# Patient Record
Sex: FEMALE | Race: WHITE | NOT HISPANIC OR LATINO | Employment: UNEMPLOYED | URBAN - METROPOLITAN AREA
[De-identification: names, ages, dates, MRNs, and addresses within clinical notes are randomized per-mention and may not be internally consistent; named-entity substitution may affect disease eponyms.]

---

## 2017-02-23 ENCOUNTER — TELEPHONE (OUTPATIENT)
Dept: OTHER | Facility: CLINIC | Age: 42
End: 2017-02-23

## 2017-02-23 NOTE — TELEPHONE ENCOUNTER
2/23/2017    Call Regarding Onboarding are Choices     Attempt 1    Message on voicemail     Comments: No Dep        Outreach   cnt

## 2017-03-14 ENCOUNTER — OFFICE VISIT (OUTPATIENT)
Dept: FAMILY MEDICINE | Facility: CLINIC | Age: 42
End: 2017-03-14

## 2017-03-14 VITALS
SYSTOLIC BLOOD PRESSURE: 116 MMHG | BODY MASS INDEX: 33.55 KG/M2 | DIASTOLIC BLOOD PRESSURE: 80 MMHG | HEART RATE: 103 BPM | OXYGEN SATURATION: 97 % | WEIGHT: 197 LBS | TEMPERATURE: 98.7 F

## 2017-03-14 DIAGNOSIS — J98.01 ACUTE BRONCHOSPASM: Primary | ICD-10-CM

## 2017-03-14 PROCEDURE — 99213 OFFICE O/P EST LOW 20 MIN: CPT | Performed by: FAMILY MEDICINE

## 2017-03-14 RX ORDER — PREDNISONE 20 MG/1
20 TABLET ORAL 2 TIMES DAILY
Qty: 10 TABLET | Refills: 0 | Status: SHIPPED | OUTPATIENT
Start: 2017-03-14 | End: 2020-01-21

## 2017-03-14 RX ORDER — ALBUTEROL SULFATE 90 UG/1
2 AEROSOL, METERED RESPIRATORY (INHALATION) EVERY 4 HOURS PRN
Qty: 1 INHALER | Refills: 0 | Status: SHIPPED | OUTPATIENT
Start: 2017-03-14

## 2017-03-14 NOTE — MR AVS SNAPSHOT
"              After Visit Summary   3/14/2017    Pauline Herrera    MRN: [id number]           Patient Information     Date Of Birth          1975        Visit Information        Provider Department      3/14/2017 3:15 PM Twin Garber MD [facility]        Today's Diagnoses     Acute bronchospasm    -  1      Care Instructions    Take two tablets every morning for 3 days, then 1 tablet every morning for 3 days, then 1/2 tablet every morning for 2 days.          Follow-ups after your visit        Who to contact     If you have questions or need follow up information about today's clinic visit or your schedule please contact [facility] directly at [phone].  Normal or non-critical lab and imaging results will be communicated to you by [employer], letter or phone within 4 business days after the clinic has received the results. If you do not hear from us within 7 days, please contact the clinic through [employer] or phone. If you have a critical or abnormal lab result, we will notify you by phone as soon as possible.  Submit refill requests through [employer] or call your pharmacy and they will forward the refill request to us. Please allow 3 business days for your refill to be completed.          Additional Information About Your Visit        MyChart Information     [employer] lets you send messages to your doctor, view your test results, renew your prescriptions, schedule appointments and more. To sign up, go to [email or web address] . Click on \"Log in\" on the left side of the screen, which will take you to the Welcome page. Then click on \"Sign up Now\" on the right side of the page.     You will be asked to enter the access code listed below, as well as some personal information. Please follow the directions to create your username and password.     Your access code is: [email or web address]  Expires: 2017  3:44 PM     Your access code will  in 90 days. If you need help or a new code, " please call your [place] clinic or [phone].        Care EveryWhere ID     This is your Care EveryWhere ID. This could be used by other organizations to access your [place] medical records  [id number]        Your Vitals Were     Pulse Temperature Pulse Oximetry BMI (Body Mass Index)          103 98.7  F (37.1  C) 97% 33.55 kg/m2         Blood Pressure from Last 3 Encounters:   03/14/17 116/80   05/16/16 (!) 138/104   04/11/16 138/88    Weight from Last 3 Encounters:   03/14/17 89.4 kg (197 lb)   05/16/16 90.3 kg (199 lb)   04/11/16 94.8 kg (209 lb)              Today, you had the following     No orders found for display         Today's Medication Changes          These changes are accurate as of: 3/14/17  3:44 PM.  If you have any questions, ask your nurse or doctor.               Start taking these medicines.        Dose/Directions    albuterol 108 (90 BASE) MCG/ACT Inhaler   Commonly known as:  PROAIR HFA/PROVENTIL HFA/VENTOLIN HFA   Used for:  Acute bronchospasm   Started by:  Twin Garber MD        Dose:  2 puff   Inhale 2 puffs into the lungs every 4 hours as needed for shortness of breath / dyspnea or wheezing   Quantity:  1 Inhaler   Refills:  0       predniSONE 20 MG tablet   Commonly known as:  DELTASONE   Used for:  Acute bronchospasm   Started by:  Twin Garber MD        Dose:  20 mg   Take 1 tablet (20 mg) by mouth 2 times daily   Quantity:  10 tablet   Refills:  0            Where to get your medicines      These medications were sent to [place] [id number] [place] [facility] [address]  [address] [facility] [address]     Phone:  [phone]     albuterol 108 (90 BASE) MCG/ACT Inhaler    predniSONE 20 MG tablet                Primary Care Provider Office Phone # Fax #    Twin Garber [phone]       [facility] [address]  [facility] [address]        Thank you!     Thank you for choosing [facility]  for your  care. Our goal is always to provide you with excellent care. Hearing back from our patients is one way we can continue to improve our services. Please take a few minutes to complete the written survey that you may receive in the mail after your visit with us. Thank you!             Your Updated Medication List - Protect others around you: Learn how to safely use, store and throw away your medicines at www.disposemymeds.org.          This list is accurate as of: 3/14/17  3:44 PM.  Always use your most recent med list.                   Brand Name Dispense Instructions for use    albuterol 108 (90 BASE) MCG/ACT Inhaler    PROAIR HFA/PROVENTIL HFA/VENTOLIN HFA    1 Inhaler    Inhale 2 puffs into the lungs every 4 hours as needed for shortness of breath / dyspnea or wheezing       BENADRYL PO          predniSONE 20 MG tablet    DELTASONE    10 tablet    Take 1 tablet (20 mg) by mouth 2 times daily

## 2017-03-14 NOTE — PATIENT INSTRUCTIONS
Take two tablets every morning for 3 days, then 1 tablet every morning for 3 days, then 1/2 tablet every morning for 2 days.

## 2017-03-14 NOTE — PROGRESS NOTES
5 days of runny nose, dry cough and no sore throat. No fever or chills. No SOB or chest pain.  Smoker <1PPD  ROS: no nausea or vomiting  OBJECTIVE: /80 (BP Location: Left arm)  Pulse 103  Temp 98.7  F (37.1  C)  Wt 89.4 kg (197 lb)  SpO2 97%  BMI 33.55 kg/m2   NAD except fatigue. TM's OK. Turbinates red and swollen. Pharynx injected. No nodes. Lungs are wheezing all fields, no rales, and cor RRR.  (J98.01) Acute bronchospasm  (primary encounter diagnosis)  Comment:   Plan: albuterol (PROAIR HFA/PROVENTIL HFA/VENTOLIN         HFA) 108 (90 BASE) MCG/ACT Inhaler, predniSONE         (DELTASONE) 20 MG tablet

## 2017-03-14 NOTE — LETTER
[facility] [address] [place] [address]  Phone: [phone]              3/14/2017      Pauline Herrera      TO WHOM IT MAY CONCERN:    Pauline Herrera was seen today for illness.  Please excuse her from work, starting 3/14/2017 until 3/16.        Twin Garber M.D.    [facility]

## 2017-05-22 NOTE — TELEPHONE ENCOUNTER
5/22/2017    Call Regarding Onboarding [facility]    Attempt 2    Message on voicemail     Comments:         Outreach   HERSON

## 2017-06-06 NOTE — TELEPHONE ENCOUNTER
6/6/2017    Call Regarding Onboarding [employer]    Attempt 3    Message on voicemail     Comments: dep --- spouse         Outreach   cnt

## 2017-06-17 ENCOUNTER — HEALTH MAINTENANCE LETTER (OUTPATIENT)
Age: 42
End: 2017-06-17

## 2018-02-27 NOTE — PROGRESS NOTES
"HCM:  Tetanus Declined  Pap screening        SUBJECTIVE:   Pauline Herrera is a 42 year old female who presents to clinic today for the following health issues:  Seen by Dr Garber 3/14/17 for Bronchospasm      Medication Followup of  phenterime  Last dose was yesterday  [facility]   She wants to switch it from here Taking 2 per day.  Nutritionist in the past. Seeing February.  Counting calories goal 1800  Estimated body mass index is 31 kg/(m^2) as calculated from the following:    Height as of 5/27/15: 1.632 m (5' 4.25\").    Weight as of this encounter: 82.6 kg (182 lb).  Weight loss is recommended  Wt Readings from Last 4 Encounters:   03/01/18 82.6 kg (182 lb)   03/14/17 89.4 kg (197 lb)   05/16/16 90.3 kg (199 lb)   04/11/16 94.8 kg (209 lb)         Taking Medication as prescribed: yes    Side Effects:  None    Medication Helping Symptoms:  yes       Job:  at dental practice  Hobbies: when nice walking    Sleep good  Appetite ok Today Spring roll  No eating disorder history  Exercise Beach body 2 in last week    Smoking yes <1ppd. Tried Chantix, patch (allergy).   ETOH 3 days 2-3 Jamisons. (advised 2 per day)  Street drugs/MJ no  Caffeine coffee    Depression no  Anxiety no  Panic no  SI/SP no  Hallucinations no  Paranoid no  Manic no  OCD no  PTSD no  Gambling no  No guns    Travel plans no  Exposure no            Problem list and histories reviewed & adjusted, as indicated.  Additional history: as documented    There is no problem list on file for this patient.    Past Surgical History:   Procedure Laterality Date     HC TOOTH EXTRACTION W/FORCEP         Social History   Substance Use Topics     Smoking status: Current Every Day Smoker     Smokeless tobacco: Current User     Alcohol use Yes     Family History   Problem Relation Age of Onset     Adopted: Yes         Current Outpatient Prescriptions   Medication Sig Dispense Refill     triamcinolone (KENALOG) 0.025 % cream " APPLY CREAM TOPICALLY THREE TIMES DAILY TO AFFECTED AREAS UNTIL CLEAR  0     nicotine polacrilex (TGT NICOTINE POLACRILEX) 4 MG lozenge Place 1 lozenge (4 mg) inside cheek as needed for smoking cessation As directed on packaging 360 tablet 1     DiphenhydrAMINE HCl (BENADRYL PO)        albuterol (PROAIR HFA/PROVENTIL HFA/VENTOLIN HFA) 108 (90 BASE) MCG/ACT Inhaler Inhale 2 puffs into the lungs every 4 hours as needed for shortness of breath / dyspnea or wheezing (Patient not taking: Reported on 3/1/2018) 1 Inhaler 0     predniSONE (DELTASONE) 20 MG tablet Take 1 tablet (20 mg) by mouth 2 times daily (Patient not taking: Reported on 3/1/2018) 10 tablet 0     No Known Allergies  No lab results found.   BP Readings from Last 3 Encounters:   03/01/18 124/86   03/14/17 116/80   05/16/16 (!) 138/104    Wt Readings from Last 3 Encounters:   03/01/18 82.6 kg (182 lb)   03/14/17 89.4 kg (197 lb)   05/16/16 90.3 kg (199 lb)                  Labs reviewed in EPIC    Reviewed and updated as needed this visit by clinical staff       Reviewed and updated as needed this visit by Provider         ROS:  Constitutional, HEENT, cardiovascular, pulmonary, GI, , musculoskeletal, neuro, skin, endocrine and psych systems are negative, except as otherwise noted.    History of dermatitis. Wants refills Dermatitis Derm Dr Peterson.     No flu shot  Tetanus status ? Declined    Mammogram no  Pap none in years        OBJECTIVE:     /86  Pulse 102  Temp 98.8  F (37.1  C)  Resp 18  Wt 82.6 kg (182 lb)  LMP 02/26/2018  SpO2 97%  BMI 31 kg/m2  Body mass index is 31 kg/(m^2).  GENERAL: healthy, alert and no distress  EYES: Eyes grossly normal to inspection, PERRL and conjunctivae and sclerae normal  HENT: ear canals and TM's normal, nose and mouth without ulcers or lesions  NECK: no adenopathy, no asymmetry, masses, or scars and thyroid normal to palpation  RESP: lungs clear to auscultation - no rales, rhonchi or wheezes  CV: regular  "rate and rhythm, normal S1 S2, no S3 or S4, no murmur, click or rub, no peripheral edema and peripheral pulses strong  ABDOMEN: soft, nontender, no hepatosplenomegaly, no masses and bowel sounds normal  MS: no gross musculoskeletal defects noted, no edema  SKIN: no suspicious lesions or rashes  NEURO: Normal strength and tone, mentation intact and speech normal  PSYCH: mentation appears normal, affect normal/bright  LYMPH: no cervical, supraclavicular, axillary, or inguinal adenopathy    Diagnostic Test Results:  Results for orders placed or performed in visit on 04/11/16   Rapid Strep ([facility])   Result Value Ref Range    Rapid Strep A Screen POSITIVE neg       ASSESSMENT/PLAN:     ASSESSMENT / PLAN:  (Z71.6,  Z72.0) Encounter for smoking cessation counseling  (primary encounter diagnosis)  Comment:   Plan: nicotine polacrilex (TGT NICOTINE POLACRILEX) 4        MG lozenge            (L20.9) Atopic dermatitis, unspecified type  Comment:   Plan: triamcinolone (KENALOG) 0.025 % cream            (Z13.220,  Z13.6) Encounter for lipid screening for cardiovascular disease  Comment:   Plan: Lipid Panel ([facility])            (Z68.31) BMI 31.0-31.9,adult  Wt Readings from Last 4 Encounters:   03/01/18 82.6 kg (182 lb)   03/14/17 89.4 kg (197 lb)   05/16/16 90.3 kg (199 lb)   04/11/16 94.8 kg (209 lb)     I see she has made some progress on weight loss.  Comment: She stated that she was on phentermine\" I think that I took 30 mg twice a day\" (an unlikely dose) from another provider. I have not records either with the pharmacy or [place] monitoring. She has another previous last name as Wilber. I tried that too. I have asked her for RONAL for the previous provider. She will call back with the information/return with it to do the form. No phentermine given today. Contract signed and toxassure done. She denied chem dep history.  Plan: ToxASSURE Urine Drug Screen ([facility])            (Z13.228) Screening for metabolic disorder  Comment: "   Plan: Comp. Metabolic Panel (14) ([facility]), TSH         ([facility]), Thyroxine (T4) Free  Direct  S         ([facility]), Hemoglobin A1C ([facility])            (Z13.0) Screening, anemia, deficiency, iron  Comment:   Plan: CBC with Diff/Plt ([facility])                  Liset Berman MD  [facility]

## 2018-03-01 ENCOUNTER — OFFICE VISIT (OUTPATIENT)
Dept: FAMILY MEDICINE | Facility: CLINIC | Age: 43
End: 2018-03-01

## 2018-03-01 VITALS
BODY MASS INDEX: 31 KG/M2 | OXYGEN SATURATION: 97 % | WEIGHT: 182 LBS | DIASTOLIC BLOOD PRESSURE: 86 MMHG | RESPIRATION RATE: 18 BRPM | SYSTOLIC BLOOD PRESSURE: 124 MMHG | HEART RATE: 102 BPM | TEMPERATURE: 98.8 F

## 2018-03-01 DIAGNOSIS — Z13.0 SCREENING, ANEMIA, DEFICIENCY, IRON: ICD-10-CM

## 2018-03-01 DIAGNOSIS — L20.9 ATOPIC DERMATITIS, UNSPECIFIED TYPE: ICD-10-CM

## 2018-03-01 DIAGNOSIS — Z71.6 ENCOUNTER FOR SMOKING CESSATION COUNSELING: Primary | ICD-10-CM

## 2018-03-01 DIAGNOSIS — Z13.220 ENCOUNTER FOR LIPID SCREENING FOR CARDIOVASCULAR DISEASE: ICD-10-CM

## 2018-03-01 DIAGNOSIS — Z13.228 SCREENING FOR METABOLIC DISORDER: ICD-10-CM

## 2018-03-01 DIAGNOSIS — Z13.6 ENCOUNTER FOR LIPID SCREENING FOR CARDIOVASCULAR DISEASE: ICD-10-CM

## 2018-03-01 LAB
% GRANULOCYTES: 67.1 % (ref 42.2–75.2)
HCT VFR BLD AUTO: 43.6 % (ref 35–46)
HEMOGLOBIN: 14.5 G/DL (ref 11.8–15.5)
LYMPHOCYTES NFR BLD AUTO: 26.4 % (ref 20.5–51.1)
MCH RBC QN AUTO: 30.4 PG (ref 27–31)
MCHC RBC AUTO-ENTMCNC: 33.2 G/DL (ref 33–37)
MCV RBC AUTO: 91.8 FL (ref 80–100)
MONOCYTES NFR BLD AUTO: 6.5 % (ref 1.7–9.3)
PLATELET # BLD AUTO: 340 K/UL (ref 140–450)
RBC # BLD AUTO: 4.75 X10/CMM (ref 3.7–5.2)
WBC # BLD AUTO: 7.5 X10/CMM (ref 3.8–11)

## 2018-03-01 PROCEDURE — 80061 LIPID PANEL: CPT | Mod: 90 | Performed by: FAMILY MEDICINE

## 2018-03-01 PROCEDURE — 84443 ASSAY THYROID STIM HORMONE: CPT | Mod: 90 | Performed by: FAMILY MEDICINE

## 2018-03-01 PROCEDURE — 80053 COMPREHEN METABOLIC PANEL: CPT | Mod: 90 | Performed by: FAMILY MEDICINE

## 2018-03-01 PROCEDURE — 99214 OFFICE O/P EST MOD 30 MIN: CPT | Performed by: FAMILY MEDICINE

## 2018-03-01 PROCEDURE — 84439 ASSAY OF FREE THYROXINE: CPT | Mod: 90 | Performed by: FAMILY MEDICINE

## 2018-03-01 PROCEDURE — 83036 HEMOGLOBIN GLYCOSYLATED A1C: CPT | Mod: 90 | Performed by: FAMILY MEDICINE

## 2018-03-01 PROCEDURE — 85025 COMPLETE CBC W/AUTO DIFF WBC: CPT | Performed by: FAMILY MEDICINE

## 2018-03-01 PROCEDURE — 36415 COLL VENOUS BLD VENIPUNCTURE: CPT | Performed by: FAMILY MEDICINE

## 2018-03-01 RX ORDER — TRIAMCINOLONE ACETONIDE 0.25 MG/G
CREAM TOPICAL
Qty: 80 G | Refills: 0 | Status: SHIPPED | OUTPATIENT
Start: 2018-03-01 | End: 2019-04-23

## 2018-03-01 RX ORDER — TRIAMCINOLONE ACETONIDE 0.25 MG/G
CREAM TOPICAL
Refills: 0 | COMMUNITY
Start: 2017-06-13 | End: 2018-03-01

## 2018-03-01 NOTE — MR AVS SNAPSHOT
"              After Visit Summary   3/1/2018    Pauline Herrera    MRN: [id number]           Patient Information     Date Of Birth          1975        Visit Information        Provider Department      3/1/2018 10:15 AM Liset Berman MD [facility]        Today's Diagnoses     Encounter for smoking cessation counseling    -  1    Atopic dermatitis, unspecified type        Encounter for lipid screening for cardiovascular disease        BMI 31.0-31.9,adult        Screening for metabolic disorder        Screening, anemia, deficiency, iron           Follow-ups after your visit        Who to contact     If you have questions or need follow up information about today's clinic visit or your schedule please contact [facility] directly at [phone].  Normal or non-critical lab and imaging results will be communicated to you by [employer], letter or phone within 4 business days after the clinic has received the results. If you do not hear from us within 7 days, please contact the clinic through [employer] or phone. If you have a critical or abnormal lab result, we will notify you by phone as soon as possible.  Submit refill requests through [employer] [facility] [employer] or call your pharmacy and they will forward the refill request to us. Please allow 3 business days for your refill to be completed.          Additional Information About Your Visit        MyChart Information     [facility] lets you send messages to your doctor, view your test results, renew your prescriptions, schedule appointments and more. To sign up, go to [email or web address] . Click on \"Log in\" on the left side of the screen, which will take you to the Welcome page. Then click on \"Sign up Now\" on the right side of the page.     You will be asked to enter the access code listed below, as well as some personal information. Please follow the directions to create your username and password.     Your access code is: [email or web address]  Expires: " 2018 11:20 AM     Your access code will  in 90 days. If you need help or a new code, please call your [place] clinic or [phone].        Care EveryWhere ID     This is your Care EveryWhere ID. This could be used by other organizations to access your [place] medical records  [id number]        Your Vitals Were     Pulse Temperature Respirations Last Period Pulse Oximetry BMI (Body Mass Index)    102 98.8  F (37.1  C) 18 2018 97% 31 kg/m2       Blood Pressure from Last 3 Encounters:   18 124/86   17 116/80   16 (!) 138/104    Weight from Last 3 Encounters:   18 82.6 kg (182 lb)   17 89.4 kg (197 lb)   16 90.3 kg (199 lb)              We Performed the Following     CBC with Diff/Plt ([facility])     Comp. Metabolic Panel (14) ([facility])     Hemoglobin A1C ([facility])     Lipid Panel ([facility])     Thyroxine (T4) Free  Direct  S ([facility])     ToxASSURE Urine Drug Screen ([facility])     TSH ([facility])          Today's Medication Changes          These changes are accurate as of 3/1/18 11:20 AM.  If you have any questions, ask your nurse or doctor.               Start taking these medicines.        Dose/Directions    nicotine polacrilex 4 MG lozenge   Commonly known as:  TGT NICOTINE POLACRILEX   Used for:  Encounter for smoking cessation counseling   Started by:  Liset Berman MD        Dose:  4 mg   Place 1 lozenge (4 mg) inside cheek as needed for smoking cessation As directed on packaging   Quantity:  360 tablet   Refills:  1         These medicines have changed or have updated prescriptions.        Dose/Directions    triamcinolone 0.025 % cream   Commonly known as:  KENALOG   This may have changed:  See the new instructions.   Used for:  Atopic dermatitis, unspecified type   Changed by:  Liset Berman MD        APPLY CREAM TOPICALLY THREE TIMES DAILY TO AFFECTED AREAS UNTIL CLEAR   Quantity:  80 g   Refills:  0            Where to get your medicines      These  medications were sent to [place] [id number] [facility] [place], [place] - [address]  [address], [facility] [address]     Phone:  [phone]     nicotine polacrilex 4 MG lozenge    triamcinolone 0.025 % cream                Primary Care Provider Office Phone # Fax #    Twin Garber [phone] [address]  [facility] [address]        Equal Access to Services     IMER [facility] : Hadii aad ku hadasho Soomaali, waaxda luqadaha, qaybta kaalmada adeegyada, waxay idiin hayaan adeeg kharash la'aan . So [facility] [phone].    ATENCIÓN: Si habla español, tiene a wall disposición servicios gratuitos de asistencia lingüística. Ll[facility] [phone].    We comply with applicable federal civil rights laws and [place] laws. We do not discriminate on the basis of race, color, national origin, age, disability, sex, sexual orientation, or gender identity.            Thank you!     Thank you for choosing [facility]  for your care. Our goal is always to provide you with excellent care. Hearing back from our patients is one way we can continue to improve our services. Please take a few minutes to complete the written survey that you may receive in the mail after your visit with us. Thank you!             Your Updated Medication List - Protect others around you: Learn how to safely use, store and throw away your medicines at www.disposemymeds.org.          This list is accurate as of 3/1/18 11:20 AM.  Always use your most recent med list.                   Brand Name Dispense Instructions for use Diagnosis    albuterol 108 (90 BASE) MCG/ACT Inhaler    PROAIR HFA/PROVENTIL HFA/VENTOLIN HFA    1 Inhaler    Inhale 2 puffs into the lungs every 4 hours as needed for shortness of breath / dyspnea or wheezing    Acute bronchospasm       BENADRYL PO           nicotine polacrilex 4 MG lozenge    TGT NICOTINE POLACRILEX    360 tablet    Place 1 lozenge (4 mg) inside cheek as needed for smoking  cessation As directed on packaging    Encounter for smoking cessation counseling       predniSONE 20 MG tablet    DELTASONE    10 tablet    Take 1 tablet (20 mg) by mouth 2 times daily    Acute bronchospasm       triamcinolone 0.025 % cream    KENALOG    80 g    APPLY CREAM TOPICALLY THREE TIMES DAILY TO AFFECTED AREAS UNTIL CLEAR    Atopic dermatitis, unspecified type

## 2018-03-01 NOTE — LETTER
[facility]    03/01/18    Patient: Pauline Herrera  YOB: 1975  Medical Record Number: [id number]                                                                  Controlled Substance Agreement  I understand that my care provider has prescribed controlled substances (narcotics, tranquilizers, and/or stimulants) to help manage my condition(s).  I am taking this medicine to help me function or work.  I know that this is strong medicine.  It could have serious side effects and even cause a dependency on the drug.  If I stop these medicines suddenly, I could have severe withdrawal symptoms.    The risks, benefits, and side effects of these medication(s) were explained to me.  I agree that:  1. I will take part in other treatments as advised by my provider.  This may be psychiatry or counseling, physical therapy, behavioral therapy, group treatment, or a referral to a pain clinic.  I will reduce or stop my medicine when my provider tells me to do so.   2. I will take my medicines as prescribed.  I will not change the dose or schedule unless my provider tells me to.  There will be no refills if I  run out early.   I may be contacted at any time without warning and asked to complete a drug test or pill count.   3. I will keep all my appointments at the clinic.  If I miss appointments or fail to follow instructions, my provider may stop my medicine.  4. I will not ask other providers to prescribe controlled substances. And I will not accept controlled substances from other people. If I need another prescribed controlled substance for a new reason, I will notify my provider within one business day.  5. If I enroll in the [place] Medical Marijuana program, I will tell my provider.  I will also sign an agreement to share my medical records with my provider.  6. I will use one pharmacy to fill all of my controlled substance prescriptions.  If my prescription is mailed to my pharmacy, it may take 5  to 7 days for my medicine to be ready.  7. I understand that my provider, clinic care team, and pharmacy can track controlled substance prescriptions from other providers through a central database (prescription monitoring program).  8. I will bring in my list of medications (or my medicine bottles) each time I come to the clinic.  REV-  04/2016                                                                                                                                            Page 1 of 2      [facility]    03/01/18    Patient: Pauline Herrera  YOB: 1975  Medical Record Number: [id number]    9. Refills of controlled substances will be made only during office hours.  It is up to me to make sure that I do not run out of my medicines on weekends or holidays.    10. I am responsible for my prescriptions.  If the medicine is lost or stolen, it will not be replaced.   I also agree not to share these medicines with anyone.  11. I agree to not use ANY illegal or recreational drugs.  This includes marijuana, cocaine, bath salts or other drugs.  I agree not to use alcohol unless my provider says I may.  I agree to give urine samples whenever asked.  If I fail to give a urine sample, the provider may stop my medicine.     12. I will tell my nurse or provider right away if I become pregnant or have a new medical problem treated outside of [facility].  13. I understand that this medicine can affect my thinking and judgment.  It may be unsafe for me to drive, use machinery and do dangerous tasks.  I will not do any of these things until I know how the medicine affects me.  If my dose changes, I will wait to see how it affects me.  I will contact my provider if I have concerns about medicine side effects.  I understand that if I do not follow any of the conditions above, my prescriptions or treatment may be stopped.    I agree that my provider, clinic care team, and pharmacy may work with any  city, state or federal law enforcement agency that investigates the misuse, sale, or other diversion of my controlled medicine. I will allow my provider to discuss my care with or share a copy of this agreement with any other treating provider, pharmacy or emergency room where I receive care.  I agree to give up (waive) any right of privacy or confidentiality with respect to these authorizations.   I have read this agreement and have asked questions about anything I did not understand.   ___________________________________    ___________________________  Patient Signature                                                           Date and Time  ___________________________________     ____________________________  Witness                                                                            Date and Time  ___________________________________  Liset Berman MD  REV-  04/2016                                                                                                                                                                 Page 2 of 2

## 2018-03-02 LAB
ALBUMIN SERPL-MCNC: 4.4 G/DL (ref 3.5–5.5)
ALBUMIN/GLOB SERPL: 1.5 {RATIO} (ref 1.2–2.2)
ALP SERPL-CCNC: 71 IU/L (ref 39–117)
ALT SERPL-CCNC: 30 IU/L (ref 0–32)
AST SERPL-CCNC: 17 IU/L (ref 0–40)
BILIRUB SERPL-MCNC: 0.4 MG/DL (ref 0–1.2)
BUN SERPL-MCNC: 20 MG/DL (ref 6–24)
BUN/CREATININE RATIO: 32 (ref 9–23)
CALCIUM SERPL-MCNC: 9.6 MG/DL (ref 8.7–10.2)
CHLORIDE SERPLBLD-SCNC: 103 MMOL/L (ref 96–106)
CHOLEST SERPL-MCNC: 197 MG/DL (ref 100–199)
CREAT SERPL-MCNC: 0.62 MG/DL (ref 0.57–1)
EGFR IF AFRICN AM: 130 ML/MIN/1.73
EGFR IF NONAFRICN AM: 112 ML/MIN/1.73
GLOBULIN, TOTAL: 2.9 G/DL (ref 1.5–4.5)
GLUCOSE SERPL-MCNC: 108 MG/DL (ref 65–99)
HBA1C MFR BLD: 5.8 % (ref 4.8–5.6)
HDLC SERPL-MCNC: 71 MG/DL
LDL/HDL RATIO: 1.2 RATIO UNITS (ref 0–3.2)
LDLC SERPL CALC-MCNC: 85 MG/DL (ref 0–99)
POTASSIUM SERPL-SCNC: 4.3 MMOL/L (ref 3.5–5.2)
PROT SERPL-MCNC: 7.3 G/DL (ref 6–8.5)
SODIUM SERPL-SCNC: 142 MMOL/L (ref 134–144)
T4 FREE SERPL-MCNC: 1.14 NG/DL (ref 0.82–1.77)
TOTAL CO2: 22 MMOL/L (ref 18–28)
TRIGL SERPL-MCNC: 207 MG/DL (ref 0–149)
TSH BLD-ACNC: 0.9 UIU/ML (ref 0.45–4.5)
VLDLC SERPL CALC-MCNC: 41 MG/DL (ref 5–40)

## 2018-03-07 LAB — SUMMARY OF DRUGS IDENTIFIED: NORMAL

## 2018-03-15 ENCOUNTER — TELEPHONE (OUTPATIENT)
Dept: FAMILY MEDICINE | Facility: CLINIC | Age: 43
End: 2018-03-15

## 2018-03-15 NOTE — TELEPHONE ENCOUNTER
----- Message from Liset Berman MD sent at 3/11/2018  9:56 AM CDT -----  Please call the patient with the results.   The phentermine was  Noted on your screen . We have not received records from previous prescriber. Please sign a RONAL so that we may get these if you have not already done so.    Enclosed are your recent lab results. Your drug screen was positive for marijuana use.    This is an illegal drug in the state of [place], unless you are on the [place] Marijuana program with a certificate. Continued use will break our pain contract and not allow us to prescribe controlled substances for you.    In other words, all marijuana use must stop, before being considered for this medication.    Please contact our behavioral providers if you need resources to help you quit.         Liset Berman MD

## 2019-04-23 DIAGNOSIS — L20.9 ATOPIC DERMATITIS, UNSPECIFIED TYPE: ICD-10-CM

## 2019-04-23 RX ORDER — TRIAMCINOLONE ACETONIDE 0.25 MG/G
CREAM TOPICAL
Qty: 80 G | Refills: 0 | Status: SHIPPED | OUTPATIENT
Start: 2019-04-23 | End: 2019-11-21

## 2019-11-21 ENCOUNTER — OFFICE VISIT (OUTPATIENT)
Dept: FAMILY MEDICINE | Facility: CLINIC | Age: 44
End: 2019-11-21

## 2019-11-21 VITALS
HEART RATE: 101 BPM | BODY MASS INDEX: 30.92 KG/M2 | DIASTOLIC BLOOD PRESSURE: 96 MMHG | WEIGHT: 192.38 LBS | HEIGHT: 66 IN | SYSTOLIC BLOOD PRESSURE: 158 MMHG | RESPIRATION RATE: 16 BRPM | OXYGEN SATURATION: 96 %

## 2019-11-21 DIAGNOSIS — E66.811 CLASS 1 OBESITY DUE TO EXCESS CALORIES WITHOUT SERIOUS COMORBIDITY IN ADULT, UNSPECIFIED BMI: ICD-10-CM

## 2019-11-21 DIAGNOSIS — I10 ESSENTIAL HYPERTENSION: ICD-10-CM

## 2019-11-21 DIAGNOSIS — Z71.6 ENCOUNTER FOR SMOKING CESSATION COUNSELING: ICD-10-CM

## 2019-11-21 DIAGNOSIS — L20.9 ATOPIC DERMATITIS, UNSPECIFIED TYPE: ICD-10-CM

## 2019-11-21 DIAGNOSIS — J40 BRONCHITIS: Primary | ICD-10-CM

## 2019-11-21 DIAGNOSIS — E66.09 CLASS 1 OBESITY DUE TO EXCESS CALORIES WITHOUT SERIOUS COMORBIDITY IN ADULT, UNSPECIFIED BMI: ICD-10-CM

## 2019-11-21 PROCEDURE — 99213 OFFICE O/P EST LOW 20 MIN: CPT | Performed by: FAMILY MEDICINE

## 2019-11-21 RX ORDER — PHENTERMINE HYDROCHLORIDE 37.5 MG/1
37.5 TABLET ORAL
COMMUNITY
End: 2020-01-21

## 2019-11-21 RX ORDER — LISINOPRIL 5 MG/1
5 TABLET ORAL DAILY
Qty: 90 TABLET | Refills: 3 | Status: SHIPPED | OUTPATIENT
Start: 2019-11-21 | End: 2020-01-21

## 2019-11-21 RX ORDER — BUPROPION HYDROCHLORIDE 150 MG/1
150 TABLET ORAL EVERY MORNING
Qty: 30 TABLET | Refills: 3 | Status: SHIPPED | OUTPATIENT
Start: 2019-11-21 | End: 2020-01-15

## 2019-11-21 RX ORDER — AZITHROMYCIN 250 MG/1
TABLET, FILM COATED ORAL
Qty: 6 TABLET | Refills: 0 | Status: SHIPPED | OUTPATIENT
Start: 2019-11-21 | End: 2020-01-21

## 2019-11-21 RX ORDER — PHENTERMINE HYDROCHLORIDE 15 MG/1
15 CAPSULE ORAL EVERY MORNING
Qty: 30 CAPSULE | Refills: 3 | Status: SHIPPED | OUTPATIENT
Start: 2019-11-21 | End: 2020-01-21

## 2019-11-21 RX ORDER — TRIAMCINOLONE ACETONIDE 0.25 MG/G
CREAM TOPICAL
Qty: 80 G | Refills: 1 | Status: SHIPPED | OUTPATIENT
Start: 2019-11-21 | End: 2020-09-14

## 2019-11-21 ASSESSMENT — MIFFLIN-ST. JEOR: SCORE: 1544.36

## 2019-11-21 NOTE — PROGRESS NOTES
Problem(s) Oriented visit        SUBJECTIVE:                                                    Pauline Herrera is a 44 year old female who presents to clinic today for the following health issues :            1. Atopic dermatitis, unspecified type  Doing better  - triamcinolone (KENALOG) 0.025 % cream; APPLY TOPICALLY THREE TIMES DAILY TO AFFECTED AREAS UNTIL CLEAR  Dispense: 80 g; Refill: 1    2. Bronchitis  The patient complains of cough non-productive, without wheezing, dyspnea or hemoptysis for 2 months.  Quality: strong and weak  Severity: moderate  Associated symptoms: none.        3. Essential hypertension  she has Hypertension which is currently not well controlled. she has been compliant with her medications and is here today to follow up on the this issue and see if we can't work on better control of the blood pressure.    Reviewed last 6 BP readings in chart:  BP Readings from Last 6 Encounters:   11/21/19 (!) 158/96   03/01/18 124/86   03/14/17 116/80   05/16/16 (!) 138/104   04/11/16 138/88   05/27/15 134/88     she  has not experienced any significant side effects from current medications for hypertension.    NO active cardiac complaints or symptoms with exercise.      4. Encounter for smoking cessation counseling  The patient has been a long time smoker and wants to discuss smoking cessation, possibly to include medication.   Current smoking amount:1ppd  Length of time smoking: years  Number of previous attempts:3  Previous trials of Wellbutrin or Chantix: yes chantix and didn't like it    5. Class 1 obesity due to excess calories without serious comorbidity in adult, unspecified BMI  The patient has had a history of ongoing obesity.  Reviewed the weigth curves.   Their current BMI is:  Body mass index is 31.05 kg/m .  Reviewed previous attempts at weight loss which have not been successful in producing prolonged weigth loss.   Discussed current eating and exercise habits.     Reviewed weights in  "chart:  Wt Readings from Last 10 Encounters:   11/21/19 87.3 kg (192 lb 6 oz)   03/01/18 82.6 kg (182 lb)   03/14/17 89.4 kg (197 lb)   05/16/16 90.3 kg (199 lb)   04/11/16 94.8 kg (209 lb)   05/27/15 95.7 kg (211 lb)   03/09/08 70.3 kg (155 lb)      BMI Readings from Last 10 Encounters:   11/21/19 31.05 kg/m    03/01/18 31.00 kg/m    03/14/17 33.55 kg/m    05/16/16 33.89 kg/m    04/11/16 35.60 kg/m    05/27/15 35.94 kg/m    03/09/08 25.02 kg/m               Problem list, Medication list, Allergies, and Medical/Social/Surgical histories reviewed in EPIC and updated as appropriate.   Additional history: as documented    ROS:  General and CV completed and negative except as noted above    Histories:   There is no problem list on file for this patient.    Past Surgical History:   Procedure Laterality Date     HC TOOTH EXTRACTION W/FORCEP         Social History     Tobacco Use     Smoking status: Current Every Day Smoker     Smokeless tobacco: Current User   Substance Use Topics     Alcohol use: Yes     Family History   Adopted: Yes   Problem Relation Age of Onset     Medical History Unknown Mother            OBJECTIVE:                                                    BP (!) 158/96   Pulse 101   Resp 16   Ht 1.676 m (5' 6\")   Wt 87.3 kg (192 lb 6 oz)   LMP 11/07/2019 (Exact Date)   SpO2 96%   BMI 31.05 kg/m    Body mass index is 31.05 kg/m .   APPEARANCE: = Relaxed and in no distress  Conj/Eyelids = noninjected and lids and lashes are without inflammation  PERRLA/Irises = Pupils Round Reactive to Light and Irisis without inflammation  Neck = No anterior or posterior adenopathy appreciated.  Thyroid = Not enlarged and no masses felt  Resp effort = Calm regular breathing  Breath Sounds = Good air movement with no rales or rhonchi in any lung fields  Heart Rate, Rhythm, & sounds (no Murm)  = Regular rate and rhythm with no S3, S4, or murmur appreciated.  Carotid Art's = Pulses full and equal and no bruits " appreciated  Abdomen = Soft, nontender, no masses, & bowel sounds in all quadrants  Liver/Spleen = Normal span and no splenomegaly noted  Digits and Nails = FROM in all finger joints, no nail dystrophy  Ext (edema) = No pretibial edema noted or elsewhere  Musculsktl =  Strength and ROM of major joints are within normal limits  SKIN = absent significant rashes or lesions   Recent/Remote Memory = Alert and Oriented x 3  Mood/Affect = Cooperative and interested     ASSESSMENT/PLAN:                                                        Pauline was seen today for smoking cessation and weight problem.    Diagnoses and all orders for this visit:    Bronchitis    Atopic dermatitis, unspecified type  -     triamcinolone (KENALOG) 0.025 % cream; APPLY TOPICALLY THREE TIMES DAILY TO AFFECTED AREAS UNTIL CLEAR    Essential hypertension    Encounter for smoking cessation counseling    Class 1 obesity due to excess calories without serious comorbidity in adult, unspecified BMI        Work on weight loss  Regular exercise    The following health maintenance items are reviewed in Epic and correct as of today:  Health Maintenance   Topic Date Due     PREVENTIVE CARE VISIT  1975     DTAP/TDAP/TD IMMUNIZATION (1 - Tdap) 11/21/1987     HIV SCREENING  11/21/1991     PNEUMOCOCCAL IMMUNIZATION 19-64 MEDIUM RISK (1 of 1 - PPSV23) 11/21/1995     HPV  11/21/1997     PAP  11/21/2001     PHQ-2  01/01/2019     INFLUENZA VACCINE (1) 09/01/2019     IPV IMMUNIZATION  Aged Out     MENINGITIS IMMUNIZATION  Aged Out       Adrien Lyons MD  [facility]  Family Practice  [facility]  [phone]    For any issues my office # is [phone]

## 2019-11-21 NOTE — LETTER
[facility] [address] [place]  [address]  Phone: [phone]    December 4, 2019      Pauline Radhika Javier  [address]  [facility] [address]              Dear Pauline,      Here is a copy of your labs, we will discuss them at your upcoming visit.         Sincerely,     Adrien Lyons M.D.    Results for orders placed or performed in visit on 11/21/19   Comp. Metabolic Panel (14) ([facility])     Status: Abnormal   Result Value Ref Range    Glucose 153 (H) 65 - 99 mg/dL    Urea Nitrogen 16 6 - 24 mg/dL    Creatinine 0.61 0.57 - 1.00 mg/dL    eGFR If NonAfricn Am 111 >59 mL/min/1.73    eGFR If Africn Am 128 >59 mL/min/1.73    BUN/Creatinine Ratio 26 (H) 9 - 23    Sodium 136 134 - 144 mmol/L    Potassium 4.3 3.5 - 5.2 mmol/L    Chloride 102 96 - 106 mmol/L    Total CO2 22 20 - 29 mmol/L    Calcium 9.2 8.7 - 10.2 mg/dL    Protein Total 7.1 6.0 - 8.5 g/dL    Albumin 4.6 3.5 - 5.5 g/dL    Globulin, Total 2.5 1.5 - 4.5 g/dL    A/G Ratio 1.8 1.2 - 2.2    Bilirubin Total 0.4 0.0 - 1.2 mg/dL    Alkaline Phosphatase 67 39 - 117 IU/L    AST 22 0 - 40 IU/L    ALT 36 (H) 0 - 32 IU/L

## 2019-11-23 LAB
ALBUMIN SERPL-MCNC: 4.6 G/DL (ref 3.5–5.5)
ALBUMIN/GLOB SERPL: 1.8 {RATIO} (ref 1.2–2.2)
ALP SERPL-CCNC: 67 IU/L (ref 39–117)
ALT SERPL-CCNC: 36 IU/L (ref 0–32)
AST SERPL-CCNC: 22 IU/L (ref 0–40)
BILIRUB SERPL-MCNC: 0.4 MG/DL (ref 0–1.2)
BUN SERPL-MCNC: 16 MG/DL (ref 6–24)
BUN/CREATININE RATIO: 26 (ref 9–23)
CALCIUM SERPL-MCNC: 9.2 MG/DL (ref 8.7–10.2)
CHLORIDE SERPLBLD-SCNC: 102 MMOL/L (ref 96–106)
CREAT SERPL-MCNC: 0.61 MG/DL (ref 0.57–1)
EGFR IF AFRICN AM: 128 ML/MIN/1.73
EGFR IF NONAFRICN AM: 111 ML/MIN/1.73
GLOBULIN, TOTAL: 2.5 G/DL (ref 1.5–4.5)
GLUCOSE SERPL-MCNC: 153 MG/DL (ref 65–99)
POTASSIUM SERPL-SCNC: 4.3 MMOL/L (ref 3.5–5.2)
PROT SERPL-MCNC: 7.1 G/DL (ref 6–8.5)
SODIUM SERPL-SCNC: 136 MMOL/L (ref 134–144)
TOTAL CO2: 22 MMOL/L (ref 20–29)

## 2019-11-26 ENCOUNTER — TELEPHONE (OUTPATIENT)
Dept: FAMILY MEDICINE | Facility: CLINIC | Age: 44
End: 2019-11-26

## 2019-12-04 ENCOUNTER — TELEPHONE (OUTPATIENT)
Dept: FAMILY MEDICINE | Facility: CLINIC | Age: 44
End: 2019-12-04

## 2019-12-04 DIAGNOSIS — E66.09 CLASS 1 OBESITY DUE TO EXCESS CALORIES WITHOUT SERIOUS COMORBIDITY IN ADULT, UNSPECIFIED BMI: ICD-10-CM

## 2019-12-04 DIAGNOSIS — Z13.220 ENCOUNTER FOR LIPID SCREENING FOR CARDIOVASCULAR DISEASE: ICD-10-CM

## 2019-12-04 DIAGNOSIS — Z13.6 ENCOUNTER FOR LIPID SCREENING FOR CARDIOVASCULAR DISEASE: ICD-10-CM

## 2019-12-04 DIAGNOSIS — I10 ESSENTIAL HYPERTENSION: Primary | ICD-10-CM

## 2019-12-04 DIAGNOSIS — E66.811 CLASS 1 OBESITY DUE TO EXCESS CALORIES WITHOUT SERIOUS COMORBIDITY IN ADULT, UNSPECIFIED BMI: ICD-10-CM

## 2019-12-04 DIAGNOSIS — Z71.6 ENCOUNTER FOR SMOKING CESSATION COUNSELING: ICD-10-CM

## 2019-12-04 NOTE — TELEPHONE ENCOUNTER
Dr. Lyons reviewed 11/21/19 labs with elevated glucose of 153. He recommends patient RTC for hgba1c and fasting lipids.   Left message for patient to call nurse. She does have appt with Dr. Lyons 12/10/19 at 415pm. Will try to get her in for fasting labs prior to this.   Future orders placed.  Jacklyn Christianson RN

## 2019-12-04 NOTE — RESULT ENCOUNTER NOTE
Dear Pauline,  Here is a copy of your labs, we will discuss them at your upcoming visit.  Adrien Lyons MD

## 2020-01-15 DIAGNOSIS — Z71.6 ENCOUNTER FOR SMOKING CESSATION COUNSELING: ICD-10-CM

## 2020-01-16 RX ORDER — BUPROPION HYDROCHLORIDE 150 MG/1
150 TABLET ORAL EVERY MORNING
Qty: 90 TABLET | Refills: 0 | Status: SHIPPED | OUTPATIENT
Start: 2020-01-16 | End: 2020-04-15

## 2020-01-21 ENCOUNTER — OFFICE VISIT (OUTPATIENT)
Dept: FAMILY MEDICINE | Facility: CLINIC | Age: 45
End: 2020-01-21

## 2020-01-21 VITALS
SYSTOLIC BLOOD PRESSURE: 196 MMHG | HEART RATE: 88 BPM | RESPIRATION RATE: 16 BRPM | BODY MASS INDEX: 31.72 KG/M2 | HEIGHT: 65 IN | DIASTOLIC BLOOD PRESSURE: 122 MMHG | WEIGHT: 190.4 LBS

## 2020-01-21 DIAGNOSIS — I10 ESSENTIAL HYPERTENSION: Primary | ICD-10-CM

## 2020-01-21 DIAGNOSIS — R63.4 WEIGHT LOSS: ICD-10-CM

## 2020-01-21 PROCEDURE — 99214 OFFICE O/P EST MOD 30 MIN: CPT | Performed by: FAMILY MEDICINE

## 2020-01-21 RX ORDER — AMLODIPINE BESYLATE 10 MG/1
10 TABLET ORAL DAILY
Qty: 90 TABLET | Refills: 1 | Status: SHIPPED | OUTPATIENT
Start: 2020-01-21 | End: 2020-01-23

## 2020-01-21 RX ORDER — AMLODIPINE BESYLATE 10 MG/1
10 TABLET ORAL ONCE
Status: DISCONTINUED | OUTPATIENT
Start: 2020-01-21 | End: 2020-01-21

## 2020-01-21 ASSESSMENT — ANXIETY QUESTIONNAIRES
GAD7 TOTAL SCORE: 3
5. BEING SO RESTLESS THAT IT IS HARD TO SIT STILL: NOT AT ALL
7. FEELING AFRAID AS IF SOMETHING AWFUL MIGHT HAPPEN: NOT AT ALL
IF YOU CHECKED OFF ANY PROBLEMS ON THIS QUESTIONNAIRE, HOW DIFFICULT HAVE THESE PROBLEMS MADE IT FOR YOU TO DO YOUR WORK, TAKE CARE OF THINGS AT HOME, OR GET ALONG WITH OTHER PEOPLE: SOMEWHAT DIFFICULT
3. WORRYING TOO MUCH ABOUT DIFFERENT THINGS: NOT AT ALL
2. NOT BEING ABLE TO STOP OR CONTROL WORRYING: NOT AT ALL
1. FEELING NERVOUS, ANXIOUS, OR ON EDGE: SEVERAL DAYS
6. BECOMING EASILY ANNOYED OR IRRITABLE: SEVERAL DAYS

## 2020-01-21 ASSESSMENT — MIFFLIN-ST. JEOR: SCORE: 1511.59

## 2020-01-21 ASSESSMENT — PATIENT HEALTH QUESTIONNAIRE - PHQ9
5. POOR APPETITE OR OVEREATING: SEVERAL DAYS
SUM OF ALL RESPONSES TO PHQ QUESTIONS 1-9: 8

## 2020-01-21 NOTE — PROGRESS NOTES
she has Hypertension which is discovered today to not currently well controlled. she has not been on any medications and is interested to see if we can obtain better control of the blood pressure.    Reviewed last 6 BP readings in chart:  BP Readings from Last 6 Encounters:   01/21/20 (!) 196/122   11/21/19 (!) 158/96   03/01/18 124/86   03/14/17 116/80   05/16/16 (!) 138/104   04/11/16 138/88     NO active cardiac complaints or symptoms with exercise.    Adopted, no salty diet, working on losing weight.  The patient has had a history of ongoing obesity.  Reviewed the weigth curves.   Their current BMI is:  Body mass index is 32.18 kg/m .  Reviewed previous attempts at weight loss which have not been successful in producing prolonged weigth loss.   Discussed current eating and exercise habits.     Reviewed weights in chart:  Wt Readings from Last 10 Encounters:   01/21/20 86.4 kg (190 lb 6.4 oz)   11/21/19 87.3 kg (192 lb 6 oz)   03/01/18 82.6 kg (182 lb)   03/14/17 89.4 kg (197 lb)   05/16/16 90.3 kg (199 lb)   04/11/16 94.8 kg (209 lb)   05/27/15 95.7 kg (211 lb)   03/09/08 70.3 kg (155 lb)      BMI Readings from Last 10 Encounters:   01/21/20 32.18 kg/m    11/21/19 31.05 kg/m    03/01/18 31.00 kg/m    03/14/17 33.55 kg/m    05/16/16 33.89 kg/m    04/11/16 35.60 kg/m    05/27/15 35.94 kg/m    03/09/08 25.02 kg/m      Problem(s) Oriented visit      ROS:  General and CV completed and negative except as noted above     HISTORY:   reports current alcohol use.   reports that she has been smoking. She uses smokeless tobacco.    Past Medical History:   Diagnosis Date     BMI 31.0-31.9,adult      Dermatitis      Past Surgical History:   Procedure Laterality Date     HC TOOTH EXTRACTION W/FORCEP         EXAM:  BP: 196/122   Pulse: 88    Temp: Data Unavailable    Wt Readings from Last 2 Encounters:   01/21/20 86.4 kg (190 lb 6.4 oz)   11/21/19 87.3 kg (192 lb 6 oz)       BMI= Body mass index is 32.18  kg/m .    EXAM:  APPEARANCE: = Relaxed and in no distress  Conj/Eyelids = noninjected and lids and lashes are without inflammation  PERRLA/Irises = Pupils Round Reactive to Light and Irisis without inflammation  Neck = No anterior or posterior adenopathy appreciated.  Thyroid = Not enlarged and no masses felt  Resp effort = Calm regular breathing  Breath Sounds = Good air movement with no rales or rhonchi in any lung fields  Heart Rate, Rythym, & sounds (no Murm)  = Regular rate and rythym with no S3, S4, or murmer appreciated.  Carotid Art's = Pulses full and equal and no bruits appreciated  Abdomen = Soft, nontender, no masses, & bowel sounds in all quadrants  Liver/Spleen = Normal span and no splenomegaly noted  Digits and Nails = FROM in all finger joints, no nail dystrophy  Ext (edema) = No pretibial edema noted or elsewhere  Musculsktl =  Strength and ROM of major joints are within normal limits  SKIN = absent significant rashes or lesions   Recent/Remote Memory = Alert and Oriented x 3  Mood/Affect = Cooperative and interested      Assessment/Plan:  Pauline was seen today for hypertension, recheck medication and stressed.    Diagnoses and all orders for this visit:    Essential hypertension  -     amLODIPine (NORVASC) tablet 10 mg  -     naltrexone-bupropion (CONTRAVE) 8-90 MG per 12 hr tablet; One tablet (naltrexone 8 mg/bupropion 90 mg) once daily in the morning for 1 week; at week 2, increase to 1 tablet twice daily administered in the morning and evening and continue for 1 week; at week 3, increase to 2 tablets in the morning and 1 tablet in the evening and continue for 1 week;at week 4, increase to 2 tablets twice daily administered in the morning and evening and continue for the remainder of the treatment course.    Weight loss      >25 min spent with patient, greater than 50% spent on discussion/education/planning, etc. About The primary encounter diagnosis was Essential hypertension. A diagnosis of  "Weight loss was also pertinent to this visit.      COUNSELING:   reports that she has been smoking. She uses smokeless tobacco.    Estimated body mass index is 32.18 kg/m  as calculated from the following:    Height as of this encounter: 1.638 m (5' 4.5\").    Weight as of this encounter: 86.4 kg (190 lb 6.4 oz).       Appropriate preventive services were discussed with this patient, including applicable screening as appropriate for cardiovascular disease, diabetes, osteopenia/osteoporosis, and glaucoma.  As appropriate for age/gender, discussed screening for colorectal cancer, prostate cancer, breast cancer, and cervical cancer. Checklist reviewing preventive services available has been given to the patient.    Reviewed patients plan of care and provided an AVS. The Basic Care Plan (routine screening as documented in Health Maintenance) for Pauline meets the Care Plan requirement. This Care Plan has been established and reviewed with the  Patient.      The following health maintenance items are reviewed in Epic and correct as of today:  Health Maintenance   Topic Date Due     PREVENTIVE CARE VISIT  1975     DTAP/TDAP/TD IMMUNIZATION (1 - Tdap) 11/21/1987     HIV SCREENING  11/21/1991     PNEUMOCOCCAL IMMUNIZATION 19-64 MEDIUM RISK (1 of 1 - PPSV23) 11/21/1995     HPV TEST  11/21/1997     PAP  11/21/2001     INFLUENZA VACCINE (1) 09/01/2019     PHQ-2  01/01/2020     IPV IMMUNIZATION  Aged Out     MENINGITIS IMMUNIZATION  Aged Out       Adrien Lyons  [facility]  For any issues my office # is [phone]            "

## 2020-01-21 NOTE — PATIENT INSTRUCTIONS
For the Contrave take this way:             One tablet (naltrexone 8 mg/bupropion 90 mg) once daily in the morning for 1 week;             at week 2, increase to 1 tablet twice daily administered in the morning and evening and continue for 1 week;             at week 3, increase to 2 tablets in the morning and 1 tablet in the evening and continue for 1 week;             at week 4, increase to 2 tablets twice daily administered in the morning and evening and continue for the remainder of the treatment course.

## 2020-01-22 ASSESSMENT — ANXIETY QUESTIONNAIRES: GAD7 TOTAL SCORE: 3

## 2020-01-23 ENCOUNTER — TELEPHONE (OUTPATIENT)
Dept: FAMILY MEDICINE | Facility: CLINIC | Age: 45
End: 2020-01-23

## 2020-01-23 DIAGNOSIS — I10 ESSENTIAL HYPERTENSION: ICD-10-CM

## 2020-01-23 RX ORDER — AMLODIPINE BESYLATE 10 MG/1
TABLET ORAL
Qty: 90 TABLET | Refills: 1 | COMMUNITY
Start: 2020-01-23 | End: 2020-08-11

## 2020-01-23 NOTE — TELEPHONE ENCOUNTER
Call from patient that she took 2 of the Norvasc 10 mg tablets because her BP was still running high on 1 tablet. Her BP today was 146/95 at 8:30 this am and 144/90 at 3:00 pm.  She had to cancel her appointment for tomorrow with Dr Lyons and wants to know what to do regarding BP.  Per Dr Lyons patient should continue to take 2 tabs daily of Norvasc and keep log of BP readings. Patient will keep log of BP readings for 1 week and call them in to us.  If readings are to high she will call back sooner with readings. Also advised her to stop smoking.

## 2020-08-11 DIAGNOSIS — I10 ESSENTIAL HYPERTENSION: ICD-10-CM

## 2020-08-13 RX ORDER — AMLODIPINE BESYLATE 10 MG/1
TABLET ORAL
Qty: 90 TABLET | Refills: 1 | Status: SHIPPED | OUTPATIENT
Start: 2020-08-13 | End: 2020-09-16

## 2020-09-14 DIAGNOSIS — L20.9 ATOPIC DERMATITIS, UNSPECIFIED TYPE: ICD-10-CM

## 2020-09-15 RX ORDER — TRIAMCINOLONE ACETONIDE 0.25 MG/G
CREAM TOPICAL
Qty: 80 G | Refills: 1 | Status: SHIPPED | OUTPATIENT
Start: 2020-09-15 | End: 2021-12-30

## 2020-09-16 DIAGNOSIS — I10 ESSENTIAL HYPERTENSION: ICD-10-CM

## 2020-09-17 RX ORDER — AMLODIPINE BESYLATE 10 MG/1
TABLET ORAL
Qty: 180 TABLET | Refills: 1 | Status: SHIPPED | OUTPATIENT
Start: 2020-09-17 | End: 2021-12-30

## 2020-12-09 ENCOUNTER — TELEPHONE (OUTPATIENT)
Dept: FAMILY MEDICINE | Facility: CLINIC | Age: 45
End: 2020-12-09

## 2021-01-23 ENCOUNTER — HEALTH MAINTENANCE LETTER (OUTPATIENT)
Age: 46
End: 2021-01-23

## 2021-01-25 ENCOUNTER — IMMUNIZATION (OUTPATIENT)
Dept: NURSING | Facility: CLINIC | Age: 46
End: 2021-01-25
Payer: COMMERCIAL

## 2021-01-25 PROCEDURE — 91300 PR COVID VAC PFIZER DIL RECON 30 MCG/0.3 ML IM: CPT

## 2021-01-25 PROCEDURE — 0001A PR COVID VAC PFIZER DIL RECON 30 MCG/0.3 ML IM: CPT

## 2021-02-15 ENCOUNTER — IMMUNIZATION (OUTPATIENT)
Dept: NURSING | Facility: CLINIC | Age: 46
End: 2021-02-15
Attending: INTERNAL MEDICINE
Payer: COMMERCIAL

## 2021-02-15 PROCEDURE — 0002A PR COVID VAC PFIZER DIL RECON 30 MCG/0.3 ML IM: CPT

## 2021-02-15 PROCEDURE — 91300 PR COVID VAC PFIZER DIL RECON 30 MCG/0.3 ML IM: CPT

## 2021-09-04 ENCOUNTER — HEALTH MAINTENANCE LETTER (OUTPATIENT)
Age: 46
End: 2021-09-04

## 2021-12-25 ENCOUNTER — HEALTH MAINTENANCE LETTER (OUTPATIENT)
Age: 46
End: 2021-12-25

## 2021-12-30 ENCOUNTER — OFFICE VISIT (OUTPATIENT)
Dept: FAMILY MEDICINE | Facility: CLINIC | Age: 46
End: 2021-12-30

## 2021-12-30 VITALS
HEIGHT: 64 IN | RESPIRATION RATE: 18 BRPM | SYSTOLIC BLOOD PRESSURE: 170 MMHG | HEART RATE: 120 BPM | OXYGEN SATURATION: 97 % | DIASTOLIC BLOOD PRESSURE: 99 MMHG | BODY MASS INDEX: 30.9 KG/M2 | WEIGHT: 181 LBS

## 2021-12-30 DIAGNOSIS — L20.9 ATOPIC DERMATITIS, UNSPECIFIED TYPE: ICD-10-CM

## 2021-12-30 DIAGNOSIS — R00.0 TACHYCARDIA: ICD-10-CM

## 2021-12-30 DIAGNOSIS — I10 ESSENTIAL HYPERTENSION: ICD-10-CM

## 2021-12-30 DIAGNOSIS — R73.01 IFG (IMPAIRED FASTING GLUCOSE): ICD-10-CM

## 2021-12-30 DIAGNOSIS — Z23 NEEDS FLU SHOT: Primary | ICD-10-CM

## 2021-12-30 DIAGNOSIS — Z12.31 ENCOUNTER FOR SCREENING MAMMOGRAM FOR MALIGNANT NEOPLASM OF BREAST: ICD-10-CM

## 2021-12-30 DIAGNOSIS — Z71.6 ENCOUNTER FOR SMOKING CESSATION COUNSELING: ICD-10-CM

## 2021-12-30 LAB
% GRANULOCYTES: 66.4 % (ref 42.2–75.2)
HCT VFR BLD AUTO: 44.4 % (ref 35–46)
HEMOGLOBIN: 14.5 G/DL (ref 11.8–15.5)
LYMPHOCYTES NFR BLD AUTO: 27.2 % (ref 20.5–51.1)
MCH RBC QN AUTO: 31.3 PG (ref 27–31)
MCHC RBC AUTO-ENTMCNC: 32.8 G/DL (ref 33–37)
MCV RBC AUTO: 95.5 FL (ref 80–100)
MONOCYTES NFR BLD AUTO: 6.4 % (ref 1.7–9.3)
PLATELET # BLD AUTO: 260 K/UL (ref 140–450)
RBC # BLD AUTO: 4.65 X10/CMM (ref 3.7–5.2)
WBC # BLD AUTO: 7.3 X10/CMM (ref 3.8–11)

## 2021-12-30 PROCEDURE — 36415 COLL VENOUS BLD VENIPUNCTURE: CPT | Performed by: FAMILY MEDICINE

## 2021-12-30 PROCEDURE — 99214 OFFICE O/P EST MOD 30 MIN: CPT | Performed by: FAMILY MEDICINE

## 2021-12-30 PROCEDURE — 85025 COMPLETE CBC W/AUTO DIFF WBC: CPT | Performed by: FAMILY MEDICINE

## 2021-12-30 PROCEDURE — 93000 ELECTROCARDIOGRAM COMPLETE: CPT | Performed by: FAMILY MEDICINE

## 2021-12-30 RX ORDER — TRIAMCINOLONE ACETONIDE 0.25 MG/G
CREAM TOPICAL
Qty: 80 G | Refills: 1 | Status: SHIPPED | OUTPATIENT
Start: 2021-12-30 | End: 2023-01-12

## 2021-12-30 RX ORDER — AMLODIPINE BESYLATE 10 MG/1
TABLET ORAL
Qty: 180 TABLET | Refills: 1 | Status: SHIPPED | OUTPATIENT
Start: 2021-12-30

## 2021-12-30 RX ORDER — METOPROLOL SUCCINATE 50 MG/1
50 TABLET, EXTENDED RELEASE ORAL DAILY
Qty: 30 TABLET | Refills: 4 | Status: SHIPPED | OUTPATIENT
Start: 2021-12-30 | End: 2022-06-07

## 2021-12-30 ASSESSMENT — MIFFLIN-ST. JEOR: SCORE: 1454.98

## 2021-12-30 NOTE — PROGRESS NOTES
Problem(s) Oriented visit        SUBJECTIVE:                                                    Pauline Herrera is a 46 year old female who presents to clinic today for the following health issues :        1. Atopic dermatitis, unspecified type    - triamcinolone (KENALOG) 0.025 % cream; APPLY TOPICALLY THREE TIMES DAILY TO AFFECTED AREAS UNTIL CLEAR  Dispense: 80 g; Refill: 1    2. Needs flu shot  refuses    3. Encounter for screening mammogram for malignant neoplasm of breast    - MAMMO -  Screening Digital Bilateral ([facility]); Future  - VENOUS COLLECTION    4. Essential hypertension  she has Hypertension which is currently not well controlled. she has been compliant with her medications and is here today to follow up on the this issue and see if we can't work on better control of the blood pressure.    Reviewed last 6 BP readings in chart:  BP Readings from Last 6 Encounters:   12/30/21 (!) 170/99   01/21/20 (!) 196/122   11/21/19 (!) 158/96   03/01/18 124/86   03/14/17 116/80   05/16/16 (!) 138/104     she  has not experienced any significant side effects from current medications for hypertension.    NO active cardiac complaints or symptoms with exercise.    - CBC with Diff/Plt ([facility])  - Comp. Metabolic Panel (14) ([facility])  - VENOUS COLLECTION    5. Tachycardia  Last few weeks  - Comp. Metabolic Panel (14) ([facility])  - TSH ([facility])  - Thyroxine (T4) ([facility])  - EKG 12-lead complete w/read - Clinics       Problem list, Medication list, Allergies, and Medical/Social/Surgical histories reviewed in [facility] and updated as appropriate.   Additional history: as documented    ROS:  General and Resp. completed and negative except as noted above    Histories:   There is no problem list on file for this patient.    Past Surgical History:   Procedure Laterality Date     HC TOOTH EXTRACTION W/FORCEP         Social History     Tobacco Use     Smoking status: Current Every Day Smoker     Packs/day: 0.25      "Smokeless tobacco: Current User   Substance Use Topics     Alcohol use: Yes     Family History   Adopted: Yes   Problem Relation Age of Onset     Medical History Unknown Mother            OBJECTIVE:                                                    BP (!) 170/99   Pulse 120   Resp 18   Ht 1.632 m (5' 4.25\")   Wt 82.1 kg (181 lb)   SpO2 97%   BMI 30.83 kg/m    Body mass index is 30.83 kg/m .   APPEARANCE: = Relaxed and in no distress  Conj/Eyelids = noninjected and lids and lashes are without inflammation  PERRLA/Irises = Pupils Round Reactive to Light and Irisis without inflammation  Neck = No anterior or posterior adenopathy appreciated.  Thyroid = Not enlarged and no masses felt  Resp effort = Calm regular breathing  Breath Sounds = Good air movement with no rales or rhonchi in any lung fields  Heart Rate, Rhythm, & sounds (no Murm)  = tachycardia  Carotid Art's = Pulses full and equal and no bruits appreciated  Abdomen = Soft, nontender, no masses, & bowel sounds in all quadrants  Liver/Spleen = Normal span and no splenomegaly noted  Digits and Nails = FROM in all finger joints, no nail dystrophy  Ext (edema) = No pretibial edema noted or elsewhere  Musculsktl =  Strength and ROM of major joints are within normal limits  SKIN = absent significant rashes or lesions   Recent/Remote Memory = Alert and Oriented x 3  Mood/Affect = Cooperative and interested     ASSESSMENT/PLAN:                                                        Pauline was seen today for hypertension.    Diagnoses and all orders for this visit:    Needs flu shot    Atopic dermatitis, unspecified type  -     triamcinolone (KENALOG) 0.025 % cream; APPLY TOPICALLY THREE TIMES DAILY TO AFFECTED AREAS UNTIL CLEAR    Encounter for screening mammogram for malignant neoplasm of breast  -     MAMMO -  Screening Digital Bilateral ([facility]); Future  -     VENOUS COLLECTION    Essential hypertension  Reviewed her current HTN management. " Discussed our goal for her is a systolic pressure at or below 128 and diastolic pressure at or below 83.  We today managed her prescriptions with refills ensured to ensure availabilty of current medications.  Discussed the importance for aggressive management of HTN to prevent vascular complications later.  Recommended lower fat, lower carbohydrate, and lower sodium (<2000 mg)diet. Required intervals for follow up on HTN, lab studies reviewed.    Strongly recommened she follow her blood pressures outside the clinic to ensure that BPs are remaining within guidelines,.  Instructed to contact me if the readings are not within guidelines on a regular basis so we can adjust treatment as needed.    -     CBC with Diff/Plt ([facility])  -     Comp. Metabolic Panel (14) ([facility])  -     VENOUS COLLECTION  -     metoprolol succinate ER (TOPROL-XL) 50 MG 24 hr tablet; Take 1 tablet (50 mg) by mouth daily  -     amLODIPine (NORVASC) 10 MG tablet; Take 2 tablets by mouth daily    Tachycardia  Check for metabolic issues.  Start betablocker and recheck in a month or sooner if pulse   -     Comp. Metabolic Panel (14) ([facility])  -     TSH ([facility])  -     Thyroxine (T4) ([facility])  -     EKG 12-lead complete w/read - Clinics    Encounter for smoking cessation counseling    Discussed the physical, psychological, and pharmacological aspects of nicotine addiction and smoking cessation.    Discussed 2 possible regimens.  Option #1:  Chantix alone (no nicotine replacement needed), starting month pack for first month, thencontinuing month pack for 3-6 additional months.  Reviewed the main side effects of the medication and directed him to the company web site for further information and gave pt information handouts (if available)  Option #2:  Recommended nicotine replacement with either gum or patches in a descending manner starting the first day of not smoking.  Also discussed the medication Zyban in the use use smoking cessation.  Recommended  starting with 150 mg first thing in the morning for 4 days, then adding a second dose late in the afternoon or early evening.  Discussed the potential side effects including but not limited to seizure, GI upset, insomnia, headache, weight loss.    The patient will decide what they want and will let me know what they desire me to prescribe.      MEDICATIONS:  Continue current medications without change  Patient Instructions     Hypertension   What is hypertension?   Hypertension is blood pressure that keeps being higher than normal. Blood pressure is the force of blood against artery walls as the heart pumps blood through the body. Blood pressure can be unhealthy if it is above 120/80. The higher your blood pressure, the greater the health risk.   High blood pressure can be controlled if you take these steps:   Maintain a healthy weight.   Are physically active.   Follow a healthy eating plan, which includes foods that do not have a lot of salt and sodium.   Do not drink a lot of alcohol.   Our goal is to keep the systolic (top) number 128 or lower and the diastolic (bottom) number 83 or lower          The following health maintenance items are reviewed in Epic and correct as of today:  Health Maintenance   Topic Date Due     PREVENTIVE CARE VISIT  Never done     ADVANCE CARE PLANNING  Never done     MAMMO SCREENING  Never done     Pneumococcal Vaccine: Pediatrics (0 to 5 Years) and At-Risk Patients (6 to 64 Years) (1 of 2 - PPSV23) Never done     HIV SCREENING  Never done     HEPATITIS C SCREENING  Never done     PAP  Never done     DTAP/TDAP/TD IMMUNIZATION (1 - Tdap) Never done     COVID-19 Vaccine (3 - Booster for Pfizer series) 08/15/2021     INFLUENZA VACCINE (1) Never done     LIPID  03/01/2023     PHQ-2  Completed     IPV IMMUNIZATION  Aged Out     MENINGITIS IMMUNIZATION  Aged Out     HEPATITIS B IMMUNIZATION  Aged Out       Adrien Lyons MD  [facility]  Family Practice  [facility]  Group  [phone]    For any issues my office # is [phone]

## 2021-12-30 NOTE — LETTER
[facility] [id number] [place] [address] [phone]      December 30, 2021      Re: Pauline Herrera  [address] [place] [id number]        To whom it may concern:    Patient was seen and treated today at our clinic and missed work due to her illness, she will also need to miss work tomorrow as she recovers.  Patient may return to work Monday [employer] 3 with no restrictions.      Sincerely,        Adrien Lyons M.D.

## 2022-02-19 ENCOUNTER — HEALTH MAINTENANCE LETTER (OUTPATIENT)
Age: 47
End: 2022-02-19

## 2022-05-23 ENCOUNTER — OFFICE VISIT (OUTPATIENT)
Dept: FAMILY MEDICINE | Facility: CLINIC | Age: 47
End: 2022-05-23

## 2022-05-23 VITALS
RESPIRATION RATE: 18 BRPM | SYSTOLIC BLOOD PRESSURE: 131 MMHG | HEIGHT: 64 IN | DIASTOLIC BLOOD PRESSURE: 87 MMHG | TEMPERATURE: 97.5 F | WEIGHT: 169.2 LBS | BODY MASS INDEX: 28.89 KG/M2 | OXYGEN SATURATION: 98 % | HEART RATE: 93 BPM

## 2022-05-23 DIAGNOSIS — E11.9 TYPE 2 DIABETES MELLITUS WITHOUT COMPLICATION, WITHOUT LONG-TERM CURRENT USE OF INSULIN (H): ICD-10-CM

## 2022-05-23 DIAGNOSIS — R74.8 ELEVATED LIVER ENZYMES: ICD-10-CM

## 2022-05-23 DIAGNOSIS — R21 RASH AND NONSPECIFIC SKIN ERUPTION: Primary | ICD-10-CM

## 2022-05-23 LAB — HBA1C MFR BLD: 6.9 % (ref 4–6)

## 2022-05-23 PROCEDURE — 99214 OFFICE O/P EST MOD 30 MIN: CPT | Performed by: NURSE PRACTITIONER

## 2022-05-23 PROCEDURE — 83036 HEMOGLOBIN GLYCOSYLATED A1C: CPT | Performed by: NURSE PRACTITIONER

## 2022-05-23 PROCEDURE — 36415 COLL VENOUS BLD VENIPUNCTURE: CPT | Performed by: NURSE PRACTITIONER

## 2022-05-23 RX ORDER — TRIAMCINOLONE ACETONIDE 1 MG/G
CREAM TOPICAL 2 TIMES DAILY
Qty: 45 G | Refills: 1 | Status: SHIPPED | OUTPATIENT
Start: 2022-05-23

## 2022-05-23 RX ORDER — SULFAMETHOXAZOLE/TRIMETHOPRIM 800-160 MG
1 TABLET ORAL 2 TIMES DAILY
Qty: 14 TABLET | Refills: 0 | Status: SHIPPED | OUTPATIENT
Start: 2022-05-23 | End: 2022-05-30

## 2022-05-23 NOTE — PATIENT INSTRUCTIONS
Take Trimethoprim-Sulfa (Bactrim or Septra) 1 tab twice daily for 7 days.  Watch out for heartburn, nausea, light sensitivity, or diarrhea as potential side effects.     Triamcinolone applied 2 times daily to rash     I will contact you Wednesday with lab results.

## 2022-05-23 NOTE — PROGRESS NOTES
"Problem(s) Oriented visit        SUBJECTIVE:                                                    Pauline Herrera is a 46 year old female who presents to clinic today for the following health issues :    Rash started 4 days ago with red spots on right side of neck that she thought were initially due to spider bites. Then spread to chest and in between breasts. Very red and itchy. Has tried using her triamcinolone steroid cream without any improvement in symptoms. Feels like it is hives or dermatitis. Denies use of new soaps, lotions, detergents.    Diabetes - patient unaware of this diagnosis. A1C done 12/2021 was 8.6%. Not currently on any medication for this. Is adopted so unsure if has any family history of diabetes. States her diet is overall healthy.  Lab Results   Component Value Date    A1C 6.9 05/23/2022    A1C 8.6 12/30/2021    A1C 5.8 03/01/2018     Problem list, Medication list, Allergies, and Medical/Social/Surgical histories reviewed in [facility] and updated as appropriate.   Additional history: as documented    ROS:  5 point ROS completed and negative except noted above, including Gen, CV, Resp, Skin, Endo    OBJECTIVE:                                                    /87   Pulse 93   Temp 97.5  F (36.4  C) (Temporal)   Resp 18   Ht 1.626 m (5' 4\")   Wt 76.7 kg (169 lb 3.2 oz)   SpO2 98%   BMI 29.04 kg/m    Body mass index is 29.04 kg/m .   GENERAL: healthy, alert and no distress  SKIN: scattered erythematous papules & pustules on right side of neck, upper chest, between breasts. See image below. (patient consent obtained for image to be used in chart)   PSYCH: normal affect & mood           ASSESSMENT/PLAN:                                                      Pauline was seen today for derm problem.    Diagnoses and all orders for this visit:    Rash and nonspecific skin eruption  -     triamcinolone (KENALOG) 0.1 % external cream; Apply topically 2 times daily  -     " sulfamethoxazole-trimethoprim (BACTRIM DS) 800-160 MG tablet; Take 1 tablet by mouth 2 times daily for 7 days  Discussed different possible causes of rash including contact/irritant dermatitis, disseminated zoster, bacterial infection. Since not improving with topical steroid cream, decision to treat with oral antibiotics. Patient to follow-up if not starting to improve in next few days. If zoster, would be outside of 72 hour window to treat with antivirals. No oral prednisone use due to uncontrolled/untreated diabetes and potential worsening of symptoms if infection.    Type 2 diabetes mellitus without complication, without long-term current use of insulin (H)  -     Hemoglobin A1C ([facility])  Not previously diagnosed or treated. Patient has lost a lot of weight since labs drawn in December. Reviewed her labs from December with patient present explaining what they mean. Decision to test A1C again today and see where it stands. Will call later this week to discuss and start medication if indicated.     Elevated liver enzymes  -     Comp. Metabolic Panel (14) ([facility])  -     VENOUS COLLECTION  LFT's also elevated in December without known cause. Re-testing today and will further evaluate if indicated.      See Patient Instructions  Patient Instructions   Take Trimethoprim-Sulfa (Bactrim or Septra) 1 tab twice daily for 7 days.  Watch out for heartburn, nausea, light sensitivity, or diarrhea as potential side effects.     Triamcinolone applied 2 times daily to rash     I will contact you Wednesday with lab results.      MANINDER Cho CNP  [facility]  Family [facility]  [phone]    For any issues my office # is [phone]

## 2022-05-24 PROBLEM — R74.8 ELEVATED LIVER ENZYMES: Status: ACTIVE | Noted: 2022-05-24

## 2022-05-24 LAB
ALBUMIN SERPL-MCNC: 4.6 G/DL (ref 3.8–4.8)
ALBUMIN/GLOB SERPL: 1.6 {RATIO} (ref 1.2–2.2)
ALP SERPL-CCNC: 113 IU/L (ref 44–121)
ALT SERPL-CCNC: 72 IU/L (ref 0–32)
AST SERPL-CCNC: 47 IU/L (ref 0–40)
BILIRUB SERPL-MCNC: 0.4 MG/DL (ref 0–1.2)
BUN SERPL-MCNC: 15 MG/DL (ref 6–24)
BUN/CREATININE RATIO: 25 (ref 9–23)
CALCIUM SERPL-MCNC: 9.8 MG/DL (ref 8.7–10.2)
CHLORIDE SERPLBLD-SCNC: 101 MMOL/L (ref 96–106)
CREAT SERPL-MCNC: 0.59 MG/DL (ref 0.57–1)
EGFR: 113 ML/MIN/1.73
GLOBULIN, TOTAL: 2.9 G/DL (ref 1.5–4.5)
GLUCOSE SERPL-MCNC: 184 MG/DL (ref 65–99)
POTASSIUM SERPL-SCNC: 4 MMOL/L (ref 3.5–5.2)
PROT SERPL-MCNC: 7.5 G/DL (ref 6–8.5)
SODIUM SERPL-SCNC: 138 MMOL/L (ref 134–144)
TOTAL CO2: 18 MMOL/L (ref 20–29)

## 2022-05-25 ENCOUNTER — MYC MEDICAL ADVICE (OUTPATIENT)
Dept: FAMILY MEDICINE | Facility: CLINIC | Age: 47
End: 2022-05-25

## 2022-05-25 DIAGNOSIS — E11.9 TYPE 2 DIABETES MELLITUS WITHOUT COMPLICATION, WITHOUT LONG-TERM CURRENT USE OF INSULIN (H): Primary | ICD-10-CM

## 2022-05-26 RX ORDER — METFORMIN HCL 500 MG
TABLET, EXTENDED RELEASE 24 HR ORAL
Qty: 60 TABLET | Refills: 2 | Status: SHIPPED | OUTPATIENT
Start: 2022-05-26 | End: 2022-09-29

## 2022-05-31 ENCOUNTER — TELEPHONE (OUTPATIENT)
Dept: FAMILY MEDICINE | Facility: CLINIC | Age: 47
End: 2022-05-31

## 2022-05-31 NOTE — TELEPHONE ENCOUNTER
[facility] referral from: [facility] visit (referral by provider)    [facility] referral outreach attempt #2 on May 31, 2022 at 4:16 PM      Outcome: Patient not reachable after several attempts, will route to [facility] Pharmacist/Provider as an FYI.  [facility] scheduling number is [phone].  Thank you for the referral.    Sanjana Forte [facility]

## 2022-06-07 DIAGNOSIS — I10 ESSENTIAL HYPERTENSION: ICD-10-CM

## 2022-06-07 RX ORDER — METOPROLOL SUCCINATE 50 MG/1
TABLET, EXTENDED RELEASE ORAL
Qty: 30 TABLET | Refills: 3 | Status: SHIPPED | OUTPATIENT
Start: 2022-06-07 | End: 2022-09-29

## 2022-06-07 NOTE — TELEPHONE ENCOUNTER
Metoprolol. Last addressed 12/30/21.      Essential hypertension  Reviewed her current HTN management. Discussed our goal for her is a systolic pressure at or below 128 and diastolic pressure at or below 83.  We today managed her prescriptions with refills ensured to ensure availabilty of current medications.  Discussed the importance for aggressive management of HTN to prevent vascular complications later.  Recommended lower fat, lower carbohydrate, and lower sodium (<2000 mg)diet. Required intervals for follow up on HTN, lab studies reviewed.    Strongly recommened she follow her blood pressures outside the clinic to ensure that BPs are remaining within guidelines,.  Instructed to contact me if the readings are not within guidelines on a regular basis so we can adjust treatment as needed.     Reviewed last 6 BP readings in chart:      BP Readings from Last 6 Encounters:   12/30/21 (!) 170/99   01/21/20 (!) 196/122   11/21/19 (!) 158/96   03/01/18 124/86   03/14/17 116/80   05/16/16 (!) 138/104

## 2022-09-28 DIAGNOSIS — E11.9 TYPE 2 DIABETES MELLITUS WITHOUT COMPLICATION, WITHOUT LONG-TERM CURRENT USE OF INSULIN (H): ICD-10-CM

## 2022-09-28 DIAGNOSIS — I10 ESSENTIAL HYPERTENSION: ICD-10-CM

## 2022-09-28 NOTE — TELEPHONE ENCOUNTER
Metformin   LOV 5/23/22  Hemoglobin A1C   Date Value Ref Range Status   05/23/2022 6.9 (A) 4.0 - 6.0 % Final     Type 2 diabetes mellitus without complication, without long-term current use of insulin (H)  -     Hemoglobin A1C ([facility])  Not previously diagnosed or treated. Patient has lost a lot of weight since labs drawn in December. Reviewed her labs from December with patient present explaining what they mean. Decision to test A1C again today and see where it stands. Will call later this week to discuss and start medication if indicated.

## 2022-09-28 NOTE — TELEPHONE ENCOUNTER
Metoprolol. Last addressed 12/30/21. LOV 5/23/22  BP Readings from Last 3 Encounters:   05/23/22 131/87   12/30/21 (!) 170/99   01/21/20 (!) 196/122     Essential hypertension  Reviewed her current HTN management. Discussed our goal for her is a systolic pressure at or below 128 and diastolic pressure at or below 83.  We today managed her prescriptions with refills ensured to ensure availabilty of current medications.  Discussed the importance for aggressive management of HTN to prevent vascular complications later.  Recommended lower fat, lower carbohydrate, and lower sodium (<2000 mg)diet. Required intervals for follow up on HTN, lab studies reviewed.    Strongly recommened she follow her blood pressures outside the clinic to ensure that BPs are remaining within guidelines,.  Instructed to contact me if the readings are not within guidelines on a regular basis so we can adjust treatment as needed.

## 2022-09-29 RX ORDER — METFORMIN HCL 500 MG
TABLET, EXTENDED RELEASE 24 HR ORAL
Qty: 60 TABLET | Refills: 2 | Status: SHIPPED | OUTPATIENT
Start: 2022-09-29 | End: 2022-10-29

## 2022-09-29 RX ORDER — METOPROLOL SUCCINATE 50 MG/1
TABLET, EXTENDED RELEASE ORAL
Qty: 30 TABLET | Refills: 3 | Status: SHIPPED | OUTPATIENT
Start: 2022-09-29 | End: 2023-03-23

## 2022-10-22 ENCOUNTER — HEALTH MAINTENANCE LETTER (OUTPATIENT)
Age: 47
End: 2022-10-22

## 2023-01-12 DIAGNOSIS — L20.9 ATOPIC DERMATITIS, UNSPECIFIED TYPE: ICD-10-CM

## 2023-01-12 RX ORDER — TRIAMCINOLONE ACETONIDE 0.25 MG/G
CREAM TOPICAL
Qty: 80 G | Refills: 1 | Status: SHIPPED | OUTPATIENT
Start: 2023-01-12

## 2023-03-23 DIAGNOSIS — I10 ESSENTIAL HYPERTENSION: ICD-10-CM

## 2023-03-23 RX ORDER — METOPROLOL SUCCINATE 50 MG/1
TABLET, EXTENDED RELEASE ORAL
Qty: 30 TABLET | Refills: 3 | Status: SHIPPED | OUTPATIENT
Start: 2023-03-23

## 2023-04-01 ENCOUNTER — HEALTH MAINTENANCE LETTER (OUTPATIENT)
Age: 48
End: 2023-04-01

## 2023-08-27 ENCOUNTER — HEALTH MAINTENANCE LETTER (OUTPATIENT)
Age: 48
End: 2023-08-27

## 2024-01-14 ENCOUNTER — HEALTH MAINTENANCE LETTER (OUTPATIENT)
Age: 49
End: 2024-01-14

## 2024-06-01 ENCOUNTER — HEALTH MAINTENANCE LETTER (OUTPATIENT)
Age: 49
End: 2024-06-01

## 2024-08-10 ENCOUNTER — HEALTH MAINTENANCE LETTER (OUTPATIENT)
Age: 49
End: 2024-08-10

## 2024-10-19 ENCOUNTER — HEALTH MAINTENANCE LETTER (OUTPATIENT)
Age: 49
End: 2024-10-19

## 2024-11-08 ENCOUNTER — APPOINTMENT (OUTPATIENT)
Dept: ULTRASOUND IMAGING | Facility: CLINIC | Age: 49
DRG: 433 | End: 2024-11-08
Attending: EMERGENCY MEDICINE

## 2024-11-08 ENCOUNTER — HOSPITAL ENCOUNTER (INPATIENT)
Facility: CLINIC | Age: 49
LOS: 2 days | Discharge: HOME | DRG: 433 | End: 2024-11-10
Attending: EMERGENCY MEDICINE | Admitting: INTERNAL MEDICINE

## 2024-11-08 ENCOUNTER — APPOINTMENT (OUTPATIENT)
Dept: CT IMAGING | Facility: CLINIC | Age: 49
DRG: 433 | End: 2024-11-08
Attending: EMERGENCY MEDICINE

## 2024-11-08 DIAGNOSIS — F10.90 ALCOHOL USE DISORDER: ICD-10-CM

## 2024-11-08 DIAGNOSIS — R10.84 GENERALIZED ABDOMINAL PAIN: ICD-10-CM

## 2024-11-08 DIAGNOSIS — K70.31 ALCOHOLIC CIRRHOSIS OF LIVER WITH ASCITES (H): ICD-10-CM

## 2024-11-08 DIAGNOSIS — E83.42 HYPOMAGNESEMIA: ICD-10-CM

## 2024-11-08 DIAGNOSIS — F10.230 ALCOHOL DEPENDENCE WITH UNCOMPLICATED WITHDRAWAL (H): ICD-10-CM

## 2024-11-08 DIAGNOSIS — E88.89 KETOSIS (H): ICD-10-CM

## 2024-11-08 DIAGNOSIS — R74.01 TRANSAMINITIS: ICD-10-CM

## 2024-11-08 LAB
% LINING CELLS, BODY FLUID: 25 %
ABSOLUTE NEUTROPHILS, BODY FLUID: 13.3 /UL
ALBUMIN BODY FLUID SOURCE: NORMAL
ALBUMIN FLD-MCNC: 1.5 G/DL
ALBUMIN SERPL BCG-MCNC: 3.8 G/DL (ref 3.5–5.2)
ALBUMIN SERPL BCG-MCNC: 3.8 G/DL (ref 3.5–5.2)
ALBUMIN UR-MCNC: 20 MG/DL
ALP SERPL-CCNC: 247 U/L (ref 40–150)
ALT SERPL W P-5'-P-CCNC: 34 U/L (ref 0–50)
ANION GAP SERPL CALCULATED.3IONS-SCNC: 32 MMOL/L (ref 7–15)
APPEARANCE FLD: CLEAR
APPEARANCE UR: CLEAR
AST SERPL W P-5'-P-CCNC: 223 U/L (ref 0–45)
ATRIAL RATE - MUSE: NORMAL BPM
B-OH-BUTYR SERPL-SCNC: 3.27 MMOL/L
BASE EXCESS BLDV CALC-SCNC: -4 MMOL/L (ref -3–3)
BASE EXCESS BLDV CALC-SCNC: -8 MMOL/L (ref -3–3)
BASOPHILS # BLD AUTO: 0.1 10E3/UL (ref 0–0.2)
BASOPHILS NFR BLD AUTO: 1 %
BILIRUB SERPL-MCNC: 2.1 MG/DL
BILIRUB UR QL STRIP: NEGATIVE
BUN SERPL-MCNC: 3.8 MG/DL (ref 6–20)
CALCIUM SERPL-MCNC: 8.7 MG/DL (ref 8.8–10.4)
CELL COUNT BODY FLUID SOURCE: NORMAL
CHLORIDE SERPL-SCNC: 93 MMOL/L (ref 98–107)
COLOR FLD: YELLOW
COLOR UR AUTO: YELLOW
CREAT SERPL-MCNC: 0.42 MG/DL (ref 0.51–0.95)
DIASTOLIC BLOOD PRESSURE - MUSE: NORMAL MMHG
EGFRCR SERPLBLD CKD-EPI 2021: >90 ML/MIN/1.73M2
EOSINOPHIL # BLD AUTO: 0.1 10E3/UL (ref 0–0.7)
EOSINOPHIL NFR BLD AUTO: 1 %
ERYTHROCYTE [DISTWIDTH] IN BLOOD BY AUTOMATED COUNT: 18.1 % (ref 10–15)
ETHANOL SERPL-MCNC: 0.03 G/DL
GLUCOSE BLDC GLUCOMTR-MCNC: 114 MG/DL (ref 70–99)
GLUCOSE BLDC GLUCOMTR-MCNC: 173 MG/DL (ref 70–99)
GLUCOSE BLDC GLUCOMTR-MCNC: 58 MG/DL (ref 70–99)
GLUCOSE BLDC GLUCOMTR-MCNC: 63 MG/DL (ref 70–99)
GLUCOSE BLDC GLUCOMTR-MCNC: 97 MG/DL (ref 70–99)
GLUCOSE SERPL-MCNC: 57 MG/DL (ref 70–99)
GLUCOSE UR STRIP-MCNC: NEGATIVE MG/DL
HCO3 BLDV-SCNC: 15 MMOL/L (ref 21–28)
HCO3 BLDV-SCNC: 19 MMOL/L (ref 21–28)
HCO3 SERPL-SCNC: 10 MMOL/L (ref 22–29)
HCT VFR BLD AUTO: 32.2 % (ref 35–47)
HGB BLD-MCNC: 10.2 G/DL (ref 11.7–15.7)
HGB UR QL STRIP: NEGATIVE
HOLD SPECIMEN: NORMAL
HOLD SPECIMEN: NORMAL
IMM GRANULOCYTES # BLD: 0 10E3/UL
IMM GRANULOCYTES NFR BLD: 0 %
INR PPP: 1.43 (ref 0.85–1.15)
INTERPRETATION ECG - MUSE: NORMAL
KETONES UR STRIP-MCNC: 60 MG/DL
LACTATE BLD-SCNC: 3.5 MMOL/L
LACTATE BLD-SCNC: 8.5 MMOL/L
LACTATE SERPL-SCNC: 2.2 MMOL/L (ref 0.7–2)
LEUKOCYTE ESTERASE UR QL STRIP: NEGATIVE
LIPASE SERPL-CCNC: 25 U/L (ref 13–60)
LYMPHOCYTES # BLD AUTO: 1.5 10E3/UL (ref 0.8–5.3)
LYMPHOCYTES NFR BLD AUTO: 14 %
LYMPHOCYTES NFR FLD MANUAL: 40 %
MAGNESIUM SERPL-MCNC: 1.3 MG/DL (ref 1.7–2.3)
MCH RBC QN AUTO: 31.4 PG (ref 26.5–33)
MCHC RBC AUTO-ENTMCNC: 31.7 G/DL (ref 31.5–36.5)
MCV RBC AUTO: 99 FL (ref 78–100)
MONOCYTES # BLD AUTO: 0.9 10E3/UL (ref 0–1.3)
MONOCYTES NFR BLD AUTO: 8 %
MONOS+MACROS NFR FLD MANUAL: 30 %
MUCOUS THREADS #/AREA URNS LPF: PRESENT /LPF
NEUTROPHILS # BLD AUTO: 8.3 10E3/UL (ref 1.6–8.3)
NEUTROPHILS NFR BLD AUTO: 76 %
NEUTS BAND NFR FLD MANUAL: 5 %
NITRATE UR QL: NEGATIVE
NRBC # BLD AUTO: 0 10E3/UL
NRBC BLD AUTO-RTO: 0 /100
P AXIS - MUSE: NORMAL DEGREES
PCO2 BLDV: 26 MM HG (ref 40–50)
PCO2 BLDV: 27 MM HG (ref 40–50)
PH BLDV: 7.38 [PH] (ref 7.32–7.43)
PH BLDV: 7.45 [PH] (ref 7.32–7.43)
PH UR STRIP: 6 [PH] (ref 5–7)
PLATELET # BLD AUTO: 214 10E3/UL (ref 150–450)
PO2 BLDV: 39 MM HG (ref 25–47)
PO2 BLDV: 57 MM HG (ref 25–47)
POTASSIUM SERPL-SCNC: 3.4 MMOL/L (ref 3.4–5.3)
PR INTERVAL - MUSE: NORMAL MS
PROT FLD-MCNC: 3.1 G/DL
PROT SERPL-MCNC: 8.4 G/DL (ref 6.4–8.3)
PROTEIN BODY FLUID SOURCE: NORMAL
QRS DURATION - MUSE: 70 MS
QT - MUSE: 396 MS
QTC - MUSE: 580 MS
R AXIS - MUSE: 91 DEGREES
RBC # BLD AUTO: 3.25 10E6/UL (ref 3.8–5.2)
RBC URINE: <1 /HPF
SAO2 % BLDV: 74 % (ref 70–75)
SAO2 % BLDV: 91 % (ref 70–75)
SODIUM SERPL-SCNC: 135 MMOL/L (ref 135–145)
SP GR UR STRIP: 1.02 (ref 1–1.03)
SQUAMOUS EPITHELIAL: 1 /HPF
SYSTOLIC BLOOD PRESSURE - MUSE: NORMAL MMHG
T AXIS - MUSE: 44 DEGREES
UROBILINOGEN UR STRIP-MCNC: NORMAL MG/DL
VENTRICULAR RATE- MUSE: 129 BPM
WBC # BLD AUTO: 10.9 10E3/UL (ref 4–11)
WBC # FLD AUTO: 266 /UL
WBC URINE: 1 /HPF

## 2024-11-08 PROCEDURE — 250N000009 HC RX 250: Performed by: EMERGENCY MEDICINE

## 2024-11-08 PROCEDURE — 89051 BODY FLUID CELL COUNT: CPT | Performed by: EMERGENCY MEDICINE

## 2024-11-08 PROCEDURE — 49082 ABD PARACENTESIS: CPT

## 2024-11-08 PROCEDURE — 83690 ASSAY OF LIPASE: CPT | Performed by: EMERGENCY MEDICINE

## 2024-11-08 PROCEDURE — 87070 CULTURE OTHR SPECIMN AEROBIC: CPT | Performed by: EMERGENCY MEDICINE

## 2024-11-08 PROCEDURE — 82962 GLUCOSE BLOOD TEST: CPT

## 2024-11-08 PROCEDURE — 83735 ASSAY OF MAGNESIUM: CPT | Performed by: EMERGENCY MEDICINE

## 2024-11-08 PROCEDURE — 84157 ASSAY OF PROTEIN OTHER: CPT | Performed by: EMERGENCY MEDICINE

## 2024-11-08 PROCEDURE — 120N000001 HC R&B MED SURG/OB

## 2024-11-08 PROCEDURE — 84155 ASSAY OF PROTEIN SERUM: CPT | Performed by: EMERGENCY MEDICINE

## 2024-11-08 PROCEDURE — 99285 EMERGENCY DEPT VISIT HI MDM: CPT | Mod: 25

## 2024-11-08 PROCEDURE — 258N000003 HC RX IP 258 OP 636: Performed by: EMERGENCY MEDICINE

## 2024-11-08 PROCEDURE — 250N000011 HC RX IP 250 OP 636: Performed by: EMERGENCY MEDICINE

## 2024-11-08 PROCEDURE — 82803 BLOOD GASES ANY COMBINATION: CPT

## 2024-11-08 PROCEDURE — 96366 THER/PROPH/DIAG IV INF ADDON: CPT

## 2024-11-08 PROCEDURE — 74177 CT ABD & PELVIS W/CONTRAST: CPT

## 2024-11-08 PROCEDURE — 0W9G3ZZ DRAINAGE OF PERITONEAL CAVITY, PERCUTANEOUS APPROACH: ICD-10-PCS | Performed by: EMERGENCY MEDICINE

## 2024-11-08 PROCEDURE — 250N000013 HC RX MED GY IP 250 OP 250 PS 637: Performed by: EMERGENCY MEDICINE

## 2024-11-08 PROCEDURE — 81003 URINALYSIS AUTO W/O SCOPE: CPT | Performed by: EMERGENCY MEDICINE

## 2024-11-08 PROCEDURE — 87205 SMEAR GRAM STAIN: CPT | Performed by: EMERGENCY MEDICINE

## 2024-11-08 PROCEDURE — 96375 TX/PRO/DX INJ NEW DRUG ADDON: CPT

## 2024-11-08 PROCEDURE — 83605 ASSAY OF LACTIC ACID: CPT

## 2024-11-08 PROCEDURE — 83605 ASSAY OF LACTIC ACID: CPT | Performed by: EMERGENCY MEDICINE

## 2024-11-08 PROCEDURE — 93005 ELECTROCARDIOGRAM TRACING: CPT

## 2024-11-08 PROCEDURE — 96361 HYDRATE IV INFUSION ADD-ON: CPT

## 2024-11-08 PROCEDURE — 96365 THER/PROPH/DIAG IV INF INIT: CPT | Mod: 59

## 2024-11-08 PROCEDURE — 85014 HEMATOCRIT: CPT | Performed by: EMERGENCY MEDICINE

## 2024-11-08 PROCEDURE — 82077 ASSAY SPEC XCP UR&BREATH IA: CPT | Performed by: EMERGENCY MEDICINE

## 2024-11-08 PROCEDURE — 36415 COLL VENOUS BLD VENIPUNCTURE: CPT | Performed by: EMERGENCY MEDICINE

## 2024-11-08 PROCEDURE — 85004 AUTOMATED DIFF WBC COUNT: CPT | Performed by: EMERGENCY MEDICINE

## 2024-11-08 PROCEDURE — 99222 1ST HOSP IP/OBS MODERATE 55: CPT | Performed by: INTERNAL MEDICINE

## 2024-11-08 PROCEDURE — 76705 ECHO EXAM OF ABDOMEN: CPT

## 2024-11-08 PROCEDURE — 82010 KETONE BODYS QUAN: CPT | Performed by: EMERGENCY MEDICINE

## 2024-11-08 PROCEDURE — 84460 ALANINE AMINO (ALT) (SGPT): CPT | Performed by: EMERGENCY MEDICINE

## 2024-11-08 PROCEDURE — 85610 PROTHROMBIN TIME: CPT | Performed by: EMERGENCY MEDICINE

## 2024-11-08 PROCEDURE — 82042 OTHER SOURCE ALBUMIN QUAN EA: CPT | Performed by: EMERGENCY MEDICINE

## 2024-11-08 RX ORDER — FOLIC ACID 1 MG/1
1 TABLET ORAL ONCE
Status: COMPLETED | OUTPATIENT
Start: 2024-11-08 | End: 2024-11-08

## 2024-11-08 RX ORDER — DEXTROSE MONOHYDRATE 25 G/50ML
25-50 INJECTION, SOLUTION INTRAVENOUS
Status: DISCONTINUED | OUTPATIENT
Start: 2024-11-08 | End: 2024-11-10 | Stop reason: HOSPADM

## 2024-11-08 RX ORDER — HALOPERIDOL 5 MG/ML
2.5-5 INJECTION INTRAMUSCULAR EVERY 6 HOURS PRN
Status: DISCONTINUED | OUTPATIENT
Start: 2024-11-08 | End: 2024-11-08

## 2024-11-08 RX ORDER — DIAZEPAM 10 MG/2ML
5-10 INJECTION, SOLUTION INTRAMUSCULAR; INTRAVENOUS EVERY 30 MIN PRN
Status: DISCONTINUED | OUTPATIENT
Start: 2024-11-08 | End: 2024-11-08

## 2024-11-08 RX ORDER — FOLIC ACID 1 MG/1
1 TABLET ORAL DAILY
Status: DISCONTINUED | OUTPATIENT
Start: 2024-11-09 | End: 2024-11-10 | Stop reason: HOSPADM

## 2024-11-08 RX ORDER — NICOTINE POLACRILEX 4 MG
15-30 LOZENGE BUCCAL
Status: DISCONTINUED | OUTPATIENT
Start: 2024-11-08 | End: 2024-11-10 | Stop reason: HOSPADM

## 2024-11-08 RX ORDER — INSULIN GLARGINE 100 [IU]/ML
26 INJECTION, SOLUTION SUBCUTANEOUS EVERY MORNING
Status: ON HOLD | COMMUNITY
End: 2024-11-10

## 2024-11-08 RX ORDER — DIAZEPAM 10 MG/2ML
5-10 INJECTION, SOLUTION INTRAMUSCULAR; INTRAVENOUS EVERY 30 MIN PRN
Status: DISCONTINUED | OUTPATIENT
Start: 2024-11-08 | End: 2024-11-10 | Stop reason: HOSPADM

## 2024-11-08 RX ORDER — CALCIUM CARBONATE 500 MG/1
1000 TABLET, CHEWABLE ORAL 4 TIMES DAILY PRN
Status: DISCONTINUED | OUTPATIENT
Start: 2024-11-08 | End: 2024-11-10 | Stop reason: HOSPADM

## 2024-11-08 RX ORDER — AMOXICILLIN 250 MG
2 CAPSULE ORAL 2 TIMES DAILY PRN
Status: DISCONTINUED | OUTPATIENT
Start: 2024-11-08 | End: 2024-11-10 | Stop reason: HOSPADM

## 2024-11-08 RX ORDER — DEXTROSE MONOHYDRATE AND SODIUM CHLORIDE 5; .9 G/100ML; G/100ML
INJECTION, SOLUTION INTRAVENOUS ONCE
Status: COMPLETED | OUTPATIENT
Start: 2024-11-08 | End: 2024-11-08

## 2024-11-08 RX ORDER — ONDANSETRON 2 MG/ML
4 INJECTION INTRAMUSCULAR; INTRAVENOUS ONCE
Status: COMPLETED | OUTPATIENT
Start: 2024-11-08 | End: 2024-11-08

## 2024-11-08 RX ORDER — HYDROMORPHONE HYDROCHLORIDE 1 MG/ML
0.5 INJECTION, SOLUTION INTRAMUSCULAR; INTRAVENOUS; SUBCUTANEOUS
Status: DISCONTINUED | OUTPATIENT
Start: 2024-11-08 | End: 2024-11-09

## 2024-11-08 RX ORDER — OLANZAPINE 5 MG/1
5-10 TABLET, ORALLY DISINTEGRATING ORAL EVERY 6 HOURS PRN
Status: DISCONTINUED | OUTPATIENT
Start: 2024-11-08 | End: 2024-11-09

## 2024-11-08 RX ORDER — FENTANYL CITRATE 50 UG/ML
25 INJECTION, SOLUTION INTRAMUSCULAR; INTRAVENOUS ONCE
Status: COMPLETED | OUTPATIENT
Start: 2024-11-08 | End: 2024-11-08

## 2024-11-08 RX ORDER — OLANZAPINE 5 MG/1
5-10 TABLET, ORALLY DISINTEGRATING ORAL EVERY 6 HOURS PRN
Status: DISCONTINUED | OUTPATIENT
Start: 2024-11-08 | End: 2024-11-08

## 2024-11-08 RX ORDER — FLUMAZENIL 0.1 MG/ML
0.2 INJECTION, SOLUTION INTRAVENOUS
Status: DISCONTINUED | OUTPATIENT
Start: 2024-11-08 | End: 2024-11-10 | Stop reason: HOSPADM

## 2024-11-08 RX ORDER — FLUMAZENIL 0.1 MG/ML
0.2 INJECTION, SOLUTION INTRAVENOUS
Status: DISCONTINUED | OUTPATIENT
Start: 2024-11-08 | End: 2024-11-08

## 2024-11-08 RX ORDER — ONDANSETRON 4 MG/1
4 TABLET, ORALLY DISINTEGRATING ORAL EVERY 6 HOURS PRN
Status: DISCONTINUED | OUTPATIENT
Start: 2024-11-08 | End: 2024-11-09

## 2024-11-08 RX ORDER — GLIMEPIRIDE 1 MG/1
1 TABLET ORAL
Status: ON HOLD | COMMUNITY
Start: 2024-07-31 | End: 2024-11-10

## 2024-11-08 RX ORDER — TRAZODONE HYDROCHLORIDE 50 MG/1
50 TABLET, FILM COATED ORAL AT BEDTIME
COMMUNITY
Start: 2024-07-24

## 2024-11-08 RX ORDER — MORPHINE SULFATE 4 MG/ML
4 INJECTION, SOLUTION INTRAMUSCULAR; INTRAVENOUS ONCE
Status: COMPLETED | OUTPATIENT
Start: 2024-11-08 | End: 2024-11-08

## 2024-11-08 RX ORDER — DIAZEPAM 5 MG/1
10 TABLET ORAL EVERY 30 MIN PRN
Status: DISCONTINUED | OUTPATIENT
Start: 2024-11-08 | End: 2024-11-08

## 2024-11-08 RX ORDER — IOPAMIDOL 755 MG/ML
75 INJECTION, SOLUTION INTRAVASCULAR ONCE
Status: COMPLETED | OUTPATIENT
Start: 2024-11-08 | End: 2024-11-08

## 2024-11-08 RX ORDER — HYDROMORPHONE HYDROCHLORIDE 1 MG/ML
INJECTION, SOLUTION INTRAMUSCULAR; INTRAVENOUS; SUBCUTANEOUS
Status: COMPLETED
Start: 2024-11-08 | End: 2024-11-08

## 2024-11-08 RX ORDER — MAGNESIUM SULFATE HEPTAHYDRATE 40 MG/ML
2 INJECTION, SOLUTION INTRAVENOUS ONCE
Status: COMPLETED | OUTPATIENT
Start: 2024-11-08 | End: 2024-11-08

## 2024-11-08 RX ORDER — MULTIPLE VITAMINS W/ MINERALS TAB 9MG-400MCG
1 TAB ORAL ONCE
Status: COMPLETED | OUTPATIENT
Start: 2024-11-08 | End: 2024-11-08

## 2024-11-08 RX ORDER — ONDANSETRON 2 MG/ML
4 INJECTION INTRAMUSCULAR; INTRAVENOUS EVERY 6 HOURS PRN
Status: DISCONTINUED | OUTPATIENT
Start: 2024-11-08 | End: 2024-11-09

## 2024-11-08 RX ORDER — LIDOCAINE 40 MG/G
CREAM TOPICAL
Status: DISCONTINUED | OUTPATIENT
Start: 2024-11-08 | End: 2024-11-10 | Stop reason: HOSPADM

## 2024-11-08 RX ORDER — DIAZEPAM 5 MG/1
10 TABLET ORAL EVERY 30 MIN PRN
Status: DISCONTINUED | OUTPATIENT
Start: 2024-11-08 | End: 2024-11-10 | Stop reason: HOSPADM

## 2024-11-08 RX ORDER — MULTIPLE VITAMINS W/ MINERALS TAB 9MG-400MCG
1 TAB ORAL DAILY
Status: DISCONTINUED | OUTPATIENT
Start: 2024-11-09 | End: 2024-11-10 | Stop reason: HOSPADM

## 2024-11-08 RX ORDER — ACETAMINOPHEN 325 MG/1
650 TABLET ORAL EVERY 4 HOURS PRN
Status: DISCONTINUED | OUTPATIENT
Start: 2024-11-08 | End: 2024-11-09

## 2024-11-08 RX ORDER — CLONIDINE HYDROCHLORIDE 0.1 MG/1
0.1 TABLET ORAL EVERY 8 HOURS
Status: DISCONTINUED | OUTPATIENT
Start: 2024-11-09 | End: 2024-11-10 | Stop reason: HOSPADM

## 2024-11-08 RX ORDER — AMOXICILLIN 250 MG
1 CAPSULE ORAL 2 TIMES DAILY PRN
Status: DISCONTINUED | OUTPATIENT
Start: 2024-11-08 | End: 2024-11-10 | Stop reason: HOSPADM

## 2024-11-08 RX ORDER — HALOPERIDOL 5 MG/ML
2.5-5 INJECTION INTRAMUSCULAR EVERY 6 HOURS PRN
Status: DISCONTINUED | OUTPATIENT
Start: 2024-11-08 | End: 2024-11-09

## 2024-11-08 RX ORDER — ACETAMINOPHEN 650 MG/1
650 SUPPOSITORY RECTAL EVERY 4 HOURS PRN
Status: DISCONTINUED | OUTPATIENT
Start: 2024-11-08 | End: 2024-11-09

## 2024-11-08 RX ADMIN — FENTANYL CITRATE 25 MCG: 50 INJECTION, SOLUTION INTRAMUSCULAR; INTRAVENOUS at 20:32

## 2024-11-08 RX ADMIN — FOLIC ACID 1 MG: 1 TABLET ORAL at 21:17

## 2024-11-08 RX ADMIN — HYDROMORPHONE HYDROCHLORIDE 0.5 MG: 1 INJECTION, SOLUTION INTRAMUSCULAR; INTRAVENOUS; SUBCUTANEOUS at 22:47

## 2024-11-08 RX ADMIN — Medication 1 TABLET: at 21:17

## 2024-11-08 RX ADMIN — DIAZEPAM 10 MG: 5 TABLET ORAL at 21:17

## 2024-11-08 RX ADMIN — SODIUM CHLORIDE 62 ML: 9 INJECTION, SOLUTION INTRAVENOUS at 19:15

## 2024-11-08 RX ADMIN — ONDANSETRON 4 MG: 2 INJECTION, SOLUTION INTRAMUSCULAR; INTRAVENOUS at 19:47

## 2024-11-08 RX ADMIN — SODIUM CHLORIDE 1000 ML: 9 INJECTION, SOLUTION INTRAVENOUS at 18:24

## 2024-11-08 RX ADMIN — THIAMINE HCL TAB 100 MG 100 MG: 100 TAB at 21:17

## 2024-11-08 RX ADMIN — IOPAMIDOL 75 ML: 755 INJECTION, SOLUTION INTRAVENOUS at 19:14

## 2024-11-08 RX ADMIN — MAGNESIUM SULFATE HEPTAHYDRATE 2 G: 40 INJECTION, SOLUTION INTRAVENOUS at 19:33

## 2024-11-08 RX ADMIN — DEXTROSE AND SODIUM CHLORIDE: 5; 900 INJECTION, SOLUTION INTRAVENOUS at 19:57

## 2024-11-08 RX ADMIN — MORPHINE SULFATE 4 MG: 4 INJECTION, SOLUTION INTRAMUSCULAR; INTRAVENOUS at 19:47

## 2024-11-08 ASSESSMENT — LIFESTYLE VARIABLES
ORIENTATION AND CLOUDING OF SENSORIUM: ORIENTED AND CAN DO SERIAL ADDITIONS
AGITATION: SOMEWHAT MORE THAN NORMAL ACTIVITY
VISUAL DISTURBANCES: NOT PRESENT
TREMOR: NO TREMOR
AUDITORY DISTURBANCES: NOT PRESENT
PAROXYSMAL SWEATS: BEADS OF SWEAT OBVIOUS ON FOREHEAD
ANXIETY: MODERATELY ANXIOUS, OR GUARDED, SO ANXIETY IS INFERRED
HEADACHE, FULLNESS IN HEAD: NOT PRESENT
NAUSEA AND VOMITING: MILD NAUSEA WITH NO VOMITING
TOTAL SCORE: 10

## 2024-11-08 ASSESSMENT — ACTIVITIES OF DAILY LIVING (ADL)
ADLS_ACUITY_SCORE: 0

## 2024-11-08 ASSESSMENT — COLUMBIA-SUICIDE SEVERITY RATING SCALE - C-SSRS
2. HAVE YOU ACTUALLY HAD ANY THOUGHTS OF KILLING YOURSELF IN THE PAST MONTH?: NO
1. IN THE PAST MONTH, HAVE YOU WISHED YOU WERE DEAD OR WISHED YOU COULD GO TO SLEEP AND NOT WAKE UP?: NO
6. HAVE YOU EVER DONE ANYTHING, STARTED TO DO ANYTHING, OR PREPARED TO DO ANYTHING TO END YOUR LIFE?: NO

## 2024-11-08 NOTE — ED PROVIDER NOTES
Emergency Department Note      History of Present Illness     Chief Complaint   Abdominal Pain and Hypoglycemia      HPI   Pauline Herrera is a 48 year old female with history of type 2 diabetes mellitus, diabetic ketoacidosis, alcohol use disorder, alcoholic ketoacidosis, ANAID, and hypertension who presents to the ED with her  for evaluation of abdominal pain and hypoglycemia. Pauline reports a couple of days of generalized abdominal pain and distention. She states she has not eaten much because she feels she cannot fit food in her stomach.  notes she has also complained of leg pain and back pain. Patient denies vomiting, diarrhea, fever, shortness of breath, lower extremity edema, or urine symptoms such as dysuria. Denies history of liver issues. Patient does state she drinks alcohol frequently, and more recently because of the pain. Her last drink was last night. She is diabetic and last took 26 units of Lantus this morning. Notes she does not test her BG at home.    Independent Historian   None    Review of External Notes   Reviewed patient's ED visit from 8/1/2024, patient was admitted for alcohol related ketoacidosis.    Past Medical History     Medical History and Problem List   Type 2 diabetes mellitus  Hypertension  Alcohol use disorder  Metabolic acidosis increased anion gap  Diabetic ketoacidosis  Pulmonary nodule  ANAID    Medications   Lantus  Glimepiride  Amlodipine  Metoprolol succinate ER  Celecoxib    Surgical History   Irving teeth extraction    Physical Exam     No data found.    Physical Exam  General: Well-nourished, resting comfortably when I enter the room  Eyes: Pupils equal, conjunctivae pink no scleral icterus or conjunctival injection  ENT:  Moist mucus membranes  Respiratory:  Lungs clear to auscultation bilaterally, no crackles/rubs/wheezes.  Good air movement  CV: Normal rate and rhythm, no murmurs  GI:  Abdomen soft, distended.  No guarding or rebound.  Diffuse tenderness to  palpation.  Skin: Warm, dry.  No rashes or petechiae  Musculoskeletal: No peripheral edema or calf tenderness  Neuro: Alert and oriented to person/place/time  Psychiatric: Normal affect     Diagnostics     Lab Results   Labs Ordered and Resulted from Time of ED Arrival to Time of ED Departure   COMPREHENSIVE METABOLIC PANEL - Abnormal       Result Value    Sodium 135      Potassium 3.4      Carbon Dioxide (CO2) 10 (*)     Anion Gap 32 (*)     Urea Nitrogen 3.8 (*)     Creatinine 0.42 (*)     GFR Estimate >90      Calcium 8.7 (*)     Chloride 93 (*)     Glucose 57 (*)     Alkaline Phosphatase 247 (*)      (*)     ALT 34      Protein Total 8.4 (*)     Albumin 3.8      Bilirubin Total 2.1 (*)    GLUCOSE BY METER - Abnormal    GLUCOSE BY METER POCT 58 (*)    CBC WITH PLATELETS AND DIFFERENTIAL - Abnormal    WBC Count 10.9      RBC Count 3.25 (*)     Hemoglobin 10.2 (*)     Hematocrit 32.2 (*)     MCV 99      MCH 31.4      MCHC 31.7      RDW 18.1 (*)     Platelet Count 214      % Neutrophils 76      % Lymphocytes 14      % Monocytes 8      % Eosinophils 1      % Basophils 1      % Immature Granulocytes 0      NRBCs per 100 WBC 0      Absolute Neutrophils 8.3      Absolute Lymphocytes 1.5      Absolute Monocytes 0.9      Absolute Eosinophils 0.1      Absolute Basophils 0.1      Absolute Immature Granulocytes 0.0      Absolute NRBCs 0.0     ROUTINE UA WITH MICROSCOPIC REFLEX TO CULTURE - Abnormal    Color Urine Yellow      Appearance Urine Clear      Glucose Urine Negative      Bilirubin Urine Negative      Ketones Urine 60 (*)     Specific Gravity Urine 1.017      Blood Urine Negative      pH Urine 6.0      Protein Albumin Urine 20 (*)     Urobilinogen Urine Normal      Nitrite Urine Negative      Leukocyte Esterase Urine Negative      Mucus Urine Present (*)     RBC Urine <1      WBC Urine 1      Squamous Epithelials Urine 1     ETHYL ALCOHOL LEVEL - Abnormal    Alcohol ethyl 0.03 (*)    KETONE  BETA-HYDROXYBUTYRATE QUANTITATIVE, RAPID - Abnormal    Ketone (Beta-Hydroxybutyrate) Quantitative 3.27 (*)    GLUCOSE BY METER - Abnormal    GLUCOSE BY METER POCT 114 (*)    ISTAT GASES LACTATE VENOUS POCT - Abnormal    Lactic Acid POCT 8.5 (*)     Bicarbonate Venous POCT 15 (*)     O2 Sat, Venous POCT 74      pCO2 Venous POCT 26 (*)     pH Venous POCT 7.38      pO2 Venous POCT 39      Base Excess/Deficit (+/-) POCT -8.0 (*)    MAGNESIUM - Abnormal    Magnesium 1.3 (*)    INR - Abnormal    INR 1.43 (*)    GLUCOSE BY METER - Abnormal    GLUCOSE BY METER POCT 63 (*)    ISTAT GASES LACTATE VENOUS POCT - Abnormal    Lactic Acid POCT 3.5 (*)     Bicarbonate Venous POCT 19 (*)     O2 Sat, Venous POCT 91 (*)     pCO2 Venous POCT 27 (*)     pH Venous POCT 7.45 (*)     pO2 Venous POCT 57 (*)     Base Excess/Deficit (+/-) POCT -4.0 (*)    GLUCOSE BY METER - Abnormal    GLUCOSE BY METER POCT 173 (*)    LACTIC ACID WHOLE BLOOD - Abnormal    Lactic Acid 2.2 (*)    LIPASE - Normal    Lipase 25     ALBUMIN LEVEL - Normal    Albumin 3.8     PROTEIN FLUID    Protein Fluid Source Abdomen      Protein Total Fluid 3.1     ALBUMIN FLUID    Albumin Fluid Source Abdomen      Albumin fluid 1.5     CELL COUNT BODY FLUID    Color Yellow      Clarity Clear      Cell Count Fluid Source Abdomen      Total Nucleated Cells 266     DIFERENTIAL BODY FLUID    % Neutrophils 5      % Lymphocytes 40      % Monocyte/Macrophages 30      % Lining Cells 25      Absolute Neutrophils, Body Fluid 13.3     CELL COUNT WITH DIFFERENTIAL FLUID       Imaging   CT Abdomen Pelvis w Contrast   Final Result   IMPRESSION:       1.  Cirrhosis with portal hypertension, moderate four-quadrant abdominal ascites, mesenteric congestion and recanalized paraumbilical vein.   2.  Solitary 2 mm nonobstructing calculus lower pole right kidney.      US Abdomen Limited   Final Result   IMPRESSION:   1.  Hepatic cirrhosis and mild hepatomegaly.   2.  Moderate ascites.   3.  No  gallstones..                EKG   ECG taken at 1724, ECG read at 1732  Long QTc  Accelerated junctional rhythm  Rightward axis  Abnormal ECG   Rate 129 bpm. MS interval * ms. QRS duration 70 ms. QT/QTc 396/580 ms. P-R-T axes * 91 44.    Independent Interpretation   None    ED Course      Medications Administered   Medications   dextrose 5% and 0.9% NaCl + KCL 20 mEq/L infusion ( Intravenous Not Given 11/9/24 0412)   sodium chloride 0.9% BOLUS 1,000 mL (0 mLs Intravenous Stopped 11/8/24 1929)   sodium chloride 0.9% BOLUS 1,000 mL (0 mLs Intravenous Stopped 11/8/24 1929)   magnesium sulfate 2 g in 50 mL sterile water intermittent infusion (0 g Intravenous Stopped 11/8/24 2104)   Saline Flush - CT (62 mLs Intravenous $Given 11/8/24 1915)   iopamidol (ISOVUE-370) solution 75 mL (75 mLs Intravenous $Given 11/8/24 1914)   dextrose 5% and 0.9% NaCl infusion (0 mLs Intravenous Stopped 11/8/24 2104)   morphine (PF) injection 4 mg (4 mg Intravenous $Given 11/8/24 1947)   ondansetron (ZOFRAN) injection 4 mg (4 mg Intravenous $Given 11/8/24 1947)   fentaNYL (PF) (SUBLIMAZE) injection 25 mcg (25 mcg Intravenous $Given 11/8/24 2032)   thiamine (B-1) tablet 100 mg (100 mg Oral $Given 11/8/24 2117)   folic acid (FOLVITE) tablet 1 mg (1 mg Oral $Given 11/8/24 2117)   multivitamin w/minerals (THERA-VIT-M) tablet 1 tablet (1 tablet Oral $Given 11/8/24 2117)   magnesium sulfate 4 g in 100 mL sterile water intermittent infusion (4 g Intravenous $New Bag 11/9/24 0243)   potassium & sodium phosphates (NEUTRA-PHOS) Packet 2 packet (2 packets Oral or Feeding Tube $Given 11/9/24 0914)   sodium phosphate 15 mmol in NS 250mL intermittent infusion (15 mmol Intravenous $New Bag 11/9/24 1723)       Procedures   Procedures      Abdominal Paracentesis       Procedure: Abdominal Paracentesis    Indication: Abdominal Pain, Ascites, and Abdominal distension    Consent: Written from Patient    Location: Right Lower Quadrant    Preparation: Alcohol and  Povidone-iodine    Anesthesia/Sedation: Lidocaine - 1%    Ultrasound Guidance: No    Procedure detail: The patient was placed in a supine position.   The skin was prepped and draped in the usual manner.   Technique: Using a needle and syringe 25 mL of fluid was withdrawn and sent for analysis..  The fluid was clear.  After removal of the needle/catheter, a dressing was applied.      Patient Status: The patient tolerated the procedure well: Yes. There were no complications.     Discussion of Management   Spoke with Dr. Bardales for admission.    ED Course   ED Course as of 11/11/24 1030   Fri Nov 08, 2024   1750 I obtained history and examined the patient as noted above.        Additional Documentation  None    Medical Decision Making / Diagnosis     CMS Diagnoses: Lactic acid elevated due to Alcoholic ketoacidosis. At this time there are no signs of sepsis or septic shock    MIPS       None    [facility]   Pauline Herrera is a 48 year old female with history of diabetes, alcohol use disorder, alcoholic ketoacidosis, and hypertension presents to the emergency department with a complaint of abdominal pain and distention.  Patient reports that this has been going on for the past 3 days.  She has been taking her insulin, she only takes Lantus, 26 units in the morning.  She does not check her blood sugars.  Denies any history of liver cirrhosis.  She has not had any fevers, vomiting, diarrhea.  On exam patient is awake, alert.  Her abdomen is distended but soft.  She has diffuse tenderness to palpation.  Vital signs show that she is tachycardic.  Blood sugar on arrival is 58.  She is given some orange juice.  Blood sugar is rechecked and is now within acceptable limits.  Patient has an elevated lactic acid.  No signs of acidosis.  She has elevated ketones.  Bicarb is 10.  Magnesium is low. And replaced. I think she is probably in alcoholic ketosis.  Alcohol level is 0.03.  No signs of pancreatitis.  Liver enzymes are elevated  from her baseline.  Ultrasound was done and does not show any signs of cholecystitis or cholelithiasis.  Patient is given fluids.  On recheck, the patient's glucose has dropped again. Lactic acid is improving. I do not think she is septic.  I will start her on D5 NS.  Ultrasound shows hepatic cirrhosis with moderate ascites.  No signs of cholecystitis. CT scan shows cirrhosis with portal hypertension, moderate four-quadrant abdominal ascites, mesenteric congestion and recanalized paraumbilical vein.   I did do a diagnostic paracentesis and fluid is sent for analysis for SBP.  I also think that the patient is in withdrawal, she is tremulous and tachycardic.  CIWA protocol is ordered.  Repeat VBG shows an improving lactic acid with an improving bicarb.  Repeat blood sugar is within acceptable limits.  Spoke with Dr. Bardales for admission.         Disposition   The patient was admitted to the hospital.     Diagnosis     ICD-10-CM    1. Ketosis (H)  E88.89       2. Generalized abdominal pain  R10.84       3. Alcohol use disorder  F10.90       4. Transaminitis  R74.01       5. Alcoholic cirrhosis of liver with ascites (H)  K70.31 furosemide (LASIX) 20 MG tablet     folic acid (FOLVITE) 1 MG tablet     multivitamin w/minerals (THERA-VIT-M) tablet     spironolactone (ALDACTONE) 50 MG tablet     thiamine (B-1) 100 MG tablet     oxyCODONE (ROXICODONE) 5 MG tablet      6. Alcohol dependence with uncomplicated withdrawal (H)  F10.230       7. Hypomagnesemia  E83.42            Discharge Medications   Discharge Medication List as of 11/10/2024  1:36 PM        START taking these medications    Details   folic acid (FOLVITE) 1 MG tablet Take 1 tablet (1 mg) by mouth daily., Disp-30 tablet, R-0, E-Prescribe      furosemide (LASIX) 20 MG tablet Take 1 tablet (20 mg) by mouth daily., Disp-30 tablet, R-0, E-Prescribe      multivitamin w/minerals (THERA-VIT-M) tablet Take 1 tablet by mouth daily., Disp-30 tablet, R-0, E-Prescribe       oxyCODONE (ROXICODONE) 5 MG tablet Take 1 tablet (5 mg) by mouth every 4 hours as needed for moderate pain., Disp-20 tablet, R-0, Local Print      spironolactone (ALDACTONE) 50 MG tablet Take 1 tablet (50 mg) by mouth daily., Disp-30 tablet, R-0, E-Prescribe      thiamine (B-1) 100 MG tablet Take 1 tablet (100 mg) by mouth daily., Disp-30 tablet, R-0, E-Prescribe               Scribe Disclosure:  I, Martita Hreedia, am serving as a scribe at 5:56 PM on 11/8/2024 to document services personally performed by Maxine Campos DO based on my observations and the provider's statements to me.        Maxine Campos MD  11/12/24 5820

## 2024-11-08 NOTE — ED TRIAGE NOTES
Pt reports upper abdominal pain that began yesterday along with what she believed to be symptoms of DKA with feeling very dehydrated. Blood glucose on arrival is 58. Will give OJ.      Triage Assessment (Adult)       Row Name 11/08/24 5903          Triage Assessment    Airway WDL WDL        Respiratory WDL    Respiratory WDL WDL        Skin Circulation/Temperature WDL    Skin Circulation/Temperature WDL WDL        Cardiac WDL    Cardiac WDL WDL        Peripheral/Neurovascular WDL    Peripheral Neurovascular WDL WDL        Cognitive/Neuro/Behavioral WDL    Cognitive/Neuro/Behavioral WDL WDL

## 2024-11-09 LAB
ALBUMIN SERPL BCG-MCNC: 3.3 G/DL (ref 3.5–5.2)
ALP SERPL-CCNC: 214 U/L (ref 40–150)
ALT SERPL W P-5'-P-CCNC: 26 U/L (ref 0–50)
ANION GAP SERPL CALCULATED.3IONS-SCNC: 17 MMOL/L (ref 7–15)
AST SERPL W P-5'-P-CCNC: 167 U/L (ref 0–45)
ATRIAL RATE - MUSE: 108 BPM
BILIRUB SERPL-MCNC: 2.9 MG/DL
BUN SERPL-MCNC: 3.6 MG/DL (ref 6–20)
CALCIUM SERPL-MCNC: 7.6 MG/DL (ref 8.8–10.4)
CHLORIDE SERPL-SCNC: 99 MMOL/L (ref 98–107)
CREAT SERPL-MCNC: 0.45 MG/DL (ref 0.51–0.95)
DIASTOLIC BLOOD PRESSURE - MUSE: NORMAL MMHG
EGFRCR SERPLBLD CKD-EPI 2021: >90 ML/MIN/1.73M2
ERYTHROCYTE [DISTWIDTH] IN BLOOD BY AUTOMATED COUNT: 18.4 % (ref 10–15)
EST. AVERAGE GLUCOSE BLD GHB EST-MCNC: 80 MG/DL
GLUCOSE BLDC GLUCOMTR-MCNC: 102 MG/DL (ref 70–99)
GLUCOSE BLDC GLUCOMTR-MCNC: 141 MG/DL (ref 70–99)
GLUCOSE BLDC GLUCOMTR-MCNC: 71 MG/DL (ref 70–99)
GLUCOSE BLDC GLUCOMTR-MCNC: 84 MG/DL (ref 70–99)
GLUCOSE SERPL-MCNC: 132 MG/DL (ref 70–99)
HBA1C MFR BLD: 4.4 %
HCO3 SERPL-SCNC: 18 MMOL/L (ref 22–29)
HCT VFR BLD AUTO: 26.4 % (ref 35–47)
HGB BLD-MCNC: 8.9 G/DL (ref 11.7–15.7)
INR PPP: 1.48 (ref 0.85–1.15)
INTERPRETATION ECG - MUSE: NORMAL
MAGNESIUM SERPL-MCNC: 1.5 MG/DL (ref 1.7–2.3)
MAGNESIUM SERPL-MCNC: 2.5 MG/DL (ref 1.7–2.3)
MCH RBC QN AUTO: 32.6 PG (ref 26.5–33)
MCHC RBC AUTO-ENTMCNC: 33.7 G/DL (ref 31.5–36.5)
MCV RBC AUTO: 97 FL (ref 78–100)
P AXIS - MUSE: 51 DEGREES
PHOSPHATE SERPL-MCNC: 1.9 MG/DL (ref 2.5–4.5)
PHOSPHATE SERPL-MCNC: 2.3 MG/DL (ref 2.5–4.5)
PLATELET # BLD AUTO: 133 10E3/UL (ref 150–450)
POTASSIUM SERPL-SCNC: 3.6 MMOL/L (ref 3.4–5.3)
POTASSIUM SERPL-SCNC: 3.9 MMOL/L (ref 3.4–5.3)
PR INTERVAL - MUSE: 150 MS
PROT SERPL-MCNC: 7.4 G/DL (ref 6.4–8.3)
QRS DURATION - MUSE: 76 MS
QT - MUSE: 384 MS
QTC - MUSE: 514 MS
R AXIS - MUSE: 66 DEGREES
RBC # BLD AUTO: 2.73 10E6/UL (ref 3.8–5.2)
SODIUM SERPL-SCNC: 134 MMOL/L (ref 135–145)
SYSTOLIC BLOOD PRESSURE - MUSE: NORMAL MMHG
T AXIS - MUSE: 36 DEGREES
VENTRICULAR RATE- MUSE: 108 BPM
WBC # BLD AUTO: 6.2 10E3/UL (ref 4–11)

## 2024-11-09 PROCEDURE — 83735 ASSAY OF MAGNESIUM: CPT | Performed by: INTERNAL MEDICINE

## 2024-11-09 PROCEDURE — 250N000011 HC RX IP 250 OP 636: Performed by: INTERNAL MEDICINE

## 2024-11-09 PROCEDURE — 36415 COLL VENOUS BLD VENIPUNCTURE: CPT | Performed by: INTERNAL MEDICINE

## 2024-11-09 PROCEDURE — 85610 PROTHROMBIN TIME: CPT | Performed by: INTERNAL MEDICINE

## 2024-11-09 PROCEDURE — 84075 ASSAY ALKALINE PHOSPHATASE: CPT | Performed by: INTERNAL MEDICINE

## 2024-11-09 PROCEDURE — 84100 ASSAY OF PHOSPHORUS: CPT | Performed by: INTERNAL MEDICINE

## 2024-11-09 PROCEDURE — 250N000009 HC RX 250: Performed by: INTERNAL MEDICINE

## 2024-11-09 PROCEDURE — 258N000003 HC RX IP 258 OP 636: Performed by: INTERNAL MEDICINE

## 2024-11-09 PROCEDURE — 85014 HEMATOCRIT: CPT | Performed by: INTERNAL MEDICINE

## 2024-11-09 PROCEDURE — 120N000001 HC R&B MED SURG/OB

## 2024-11-09 PROCEDURE — 250N000012 HC RX MED GY IP 250 OP 636 PS 637: Performed by: INTERNAL MEDICINE

## 2024-11-09 PROCEDURE — 93010 ELECTROCARDIOGRAM REPORT: CPT | Performed by: INTERNAL MEDICINE

## 2024-11-09 PROCEDURE — 82947 ASSAY GLUCOSE BLOOD QUANT: CPT | Performed by: INTERNAL MEDICINE

## 2024-11-09 PROCEDURE — 84132 ASSAY OF SERUM POTASSIUM: CPT | Performed by: INTERNAL MEDICINE

## 2024-11-09 PROCEDURE — 99232 SBSQ HOSP IP/OBS MODERATE 35: CPT | Performed by: INTERNAL MEDICINE

## 2024-11-09 PROCEDURE — 258N000001 HC RX 258: Performed by: INTERNAL MEDICINE

## 2024-11-09 PROCEDURE — 250N000013 HC RX MED GY IP 250 OP 250 PS 637: Performed by: INTERNAL MEDICINE

## 2024-11-09 PROCEDURE — 93005 ELECTROCARDIOGRAM TRACING: CPT

## 2024-11-09 PROCEDURE — 83036 HEMOGLOBIN GLYCOSYLATED A1C: CPT | Performed by: INTERNAL MEDICINE

## 2024-11-09 RX ORDER — PROCHLORPERAZINE MALEATE 5 MG/1
5 TABLET ORAL EVERY 6 HOURS PRN
Status: DISCONTINUED | OUTPATIENT
Start: 2024-11-09 | End: 2024-11-10 | Stop reason: HOSPADM

## 2024-11-09 RX ORDER — NALOXONE HYDROCHLORIDE 0.4 MG/ML
0.4 INJECTION, SOLUTION INTRAMUSCULAR; INTRAVENOUS; SUBCUTANEOUS
Status: DISCONTINUED | OUTPATIENT
Start: 2024-11-09 | End: 2024-11-10 | Stop reason: HOSPADM

## 2024-11-09 RX ORDER — DEXTROSE MONOHYDRATE, SODIUM CHLORIDE, AND POTASSIUM CHLORIDE 50; 1.49; 9 G/1000ML; G/1000ML; G/1000ML
INJECTION, SOLUTION INTRAVENOUS CONTINUOUS
Status: DISPENSED | OUTPATIENT
Start: 2024-11-09 | End: 2024-11-09

## 2024-11-09 RX ORDER — SODIUM CHLORIDE 9 MG/ML
INJECTION, SOLUTION INTRAVENOUS CONTINUOUS
Status: DISCONTINUED | OUTPATIENT
Start: 2024-11-09 | End: 2024-11-10

## 2024-11-09 RX ORDER — NALOXONE HYDROCHLORIDE 0.4 MG/ML
0.2 INJECTION, SOLUTION INTRAMUSCULAR; INTRAVENOUS; SUBCUTANEOUS
Status: DISCONTINUED | OUTPATIENT
Start: 2024-11-09 | End: 2024-11-10 | Stop reason: HOSPADM

## 2024-11-09 RX ORDER — OXYCODONE HYDROCHLORIDE 5 MG/1
5 TABLET ORAL EVERY 4 HOURS PRN
Status: DISCONTINUED | OUTPATIENT
Start: 2024-11-09 | End: 2024-11-10 | Stop reason: HOSPADM

## 2024-11-09 RX ORDER — CEFTRIAXONE 1 G/1
1 INJECTION, POWDER, FOR SOLUTION INTRAMUSCULAR; INTRAVENOUS EVERY 24 HOURS
Status: DISCONTINUED | OUTPATIENT
Start: 2024-11-09 | End: 2024-11-10 | Stop reason: HOSPADM

## 2024-11-09 RX ORDER — MAGNESIUM SULFATE HEPTAHYDRATE 40 MG/ML
4 INJECTION, SOLUTION INTRAVENOUS ONCE
Status: COMPLETED | OUTPATIENT
Start: 2024-11-09 | End: 2024-11-09

## 2024-11-09 RX ORDER — PROCHLORPERAZINE 25 MG
25 SUPPOSITORY, RECTAL RECTAL EVERY 12 HOURS PRN
Status: DISCONTINUED | OUTPATIENT
Start: 2024-11-09 | End: 2024-11-10 | Stop reason: HOSPADM

## 2024-11-09 RX ORDER — SPIRONOLACTONE 25 MG/1
50 TABLET ORAL DAILY
Status: DISCONTINUED | OUTPATIENT
Start: 2024-11-09 | End: 2024-11-10 | Stop reason: HOSPADM

## 2024-11-09 RX ADMIN — OXYCODONE HYDROCHLORIDE 5 MG: 5 TABLET ORAL at 19:43

## 2024-11-09 RX ADMIN — Medication 1 TABLET: at 00:14

## 2024-11-09 RX ADMIN — HYDROMORPHONE HYDROCHLORIDE 0.5 MG: 1 INJECTION, SOLUTION INTRAMUSCULAR; INTRAVENOUS; SUBCUTANEOUS at 06:09

## 2024-11-09 RX ADMIN — HYDROMORPHONE HYDROCHLORIDE 0.5 MG: 1 INJECTION, SOLUTION INTRAMUSCULAR; INTRAVENOUS; SUBCUTANEOUS at 09:15

## 2024-11-09 RX ADMIN — CLONIDINE HYDROCHLORIDE 0.1 MG: 0.1 TABLET ORAL at 09:15

## 2024-11-09 RX ADMIN — INSULIN GLARGINE 15 UNITS: 100 INJECTION, SOLUTION SUBCUTANEOUS at 10:00

## 2024-11-09 RX ADMIN — HYDROMORPHONE HYDROCHLORIDE 0.5 MG: 1 INJECTION, SOLUTION INTRAMUSCULAR; INTRAVENOUS; SUBCUTANEOUS at 12:23

## 2024-11-09 RX ADMIN — MAGNESIUM SULFATE IN WATER FOR 4 G: 40 INJECTION INTRAVENOUS at 02:43

## 2024-11-09 RX ADMIN — SODIUM PHOSPHATE, MONOBASIC, MONOHYDRATE AND SODIUM PHOSPHATE, DIBASIC, ANHYDROUS 15 MMOL: 142; 276 INJECTION, SOLUTION INTRAVENOUS at 17:23

## 2024-11-09 RX ADMIN — Medication 2 PACKET: at 09:14

## 2024-11-09 RX ADMIN — POTASSIUM CHLORIDE, DEXTROSE MONOHYDRATE AND SODIUM CHLORIDE: 150; 5; 900 INJECTION, SOLUTION INTRAVENOUS at 04:11

## 2024-11-09 RX ADMIN — SPIRONOLACTONE 50 MG: 25 TABLET ORAL at 15:38

## 2024-11-09 RX ADMIN — FOLIC ACID 1 MG: 1 TABLET ORAL at 09:15

## 2024-11-09 RX ADMIN — CLONIDINE HYDROCHLORIDE 0.1 MG: 0.1 TABLET ORAL at 15:38

## 2024-11-09 RX ADMIN — THIAMINE HCL TAB 100 MG 100 MG: 100 TAB at 00:14

## 2024-11-09 RX ADMIN — OXYCODONE HYDROCHLORIDE 5 MG: 5 TABLET ORAL at 15:38

## 2024-11-09 RX ADMIN — CLONIDINE HYDROCHLORIDE 0.1 MG: 0.1 TABLET ORAL at 00:14

## 2024-11-09 RX ADMIN — THIAMINE HCL TAB 100 MG 100 MG: 100 TAB at 09:15

## 2024-11-09 RX ADMIN — Medication 1 TABLET: at 09:15

## 2024-11-09 RX ADMIN — CEFTRIAXONE SODIUM 1 G: 1 INJECTION, POWDER, FOR SOLUTION INTRAMUSCULAR; INTRAVENOUS at 03:40

## 2024-11-09 RX ADMIN — ACETAMINOPHEN 650 MG: 325 TABLET, FILM COATED ORAL at 00:15

## 2024-11-09 RX ADMIN — Medication 2 PACKET: at 02:42

## 2024-11-09 RX ADMIN — Medication 2 PACKET: at 06:08

## 2024-11-09 RX ADMIN — OXYCODONE HYDROCHLORIDE 5 MG: 5 TABLET ORAL at 23:42

## 2024-11-09 RX ADMIN — FOLIC ACID 1 MG: 1 TABLET ORAL at 00:14

## 2024-11-09 RX ADMIN — HYDROMORPHONE HYDROCHLORIDE 0.5 MG: 1 INJECTION, SOLUTION INTRAMUSCULAR; INTRAVENOUS; SUBCUTANEOUS at 02:44

## 2024-11-09 RX ADMIN — CLONIDINE HYDROCHLORIDE 0.1 MG: 0.1 TABLET ORAL at 23:42

## 2024-11-09 RX ADMIN — SODIUM CHLORIDE, PRESERVATIVE FREE: 5 INJECTION INTRAVENOUS at 16:10

## 2024-11-09 NOTE — PLAN OF CARE
11/8 1900 - 11/9 0730    Orientation: A&Ox4  Aggression Stop Light: Green   Activity: SBA  Diet/BS Checks: 2g Na. ACHS BS checks   Tele: N/A  IV Access/Drains: R/L PIV - SL  Pain Management: Reports 7-8/10 pain in abd. PRN dilaudid given x2.   Abnormal VS/Results: VSS on RA except tachycardia and HTN.   Bowel/Bladder: Continent of B/B. No BM this shift   Skin/Wounds: Dry. Bilateral peeling on bottom of feet and heels.   Consults: N/A  D/C Disposition: Pending    Other Info:   - Admit from ED this shift   - On Mag, phos, and K+ protocols. Mag 1.5 and Phos 1.9 - replaced and recheck ordered. K+ 3.6 - no replacement needed.   - CIWA assessments q4. Scoring 0-1.

## 2024-11-09 NOTE — PROGRESS NOTES
RECEIVING UNIT ED HANDOFF REVIEW    ED Nurse Handoff Report was reviewed by: Ifrah Lu RN on November 8, 2024 at 11:14 PM

## 2024-11-09 NOTE — PROVIDER NOTIFICATION
MD Notification    Notified Person: MD    Notified Person Name: Dr. Bardales     Notification Date/Time: 01:16 11/9/24     Notification Interaction: Compositence messaging     Purpose of Notification: Magnesium came back as 1.5 and phosphorous 1.9. No replacement protocol ordered- should I replace at all?     Orders Received: will order replacement    Comments:

## 2024-11-09 NOTE — PLAN OF CARE
Goal Outcome Evaluation:    Orientation: AOx4  Aggression Stop Light: Green  Activity: Ind in room  Diet/BS Checks: 2 gm NA diet, good appetite. BS checks ACHS w/sliding scale insulin.  Tele:  N/A  IV Access/Drains: L and R PIV SL.  Pain Management: PRN dilaudid given 2x for 5/10 abd pain, effective.  Abnormal VS/Results: VSS on RA ex tachy.  Bowel/Bladder: Continent, up to BR. Last BM yesterday per pt report. Phos 1.9 yesterday, finished PO replacement today, waiting for recheck to be drawn.  Skin/Wounds: Skin intact.  Consults: GI, ChemDep, Smoking Cessation  D/C Disposition: Discharge pending GI workup.  Other Info: CIWA q 4 hrs, scoring 1 and 0.

## 2024-11-09 NOTE — PROGRESS NOTES
[facility]  Hospitalist Progress Note  Lopez Bardales MD  11/09/2024    Assessment & Plan   Pauline Herrera is a 48 year old female with long standing history of almost daily alcohol use (18 months), tobacco use (1/2 PPD), currently unemployed with additional medical history including DM type II, HTN now presents to [facility] ER with the following history:     Pauline Herrera is a 48 year old female with history of type 2 diabetes mellitus, diabetic ketoacidosis, alcohol use disorder, alcoholic ketoacidosis, ANAID, and hypertension who presents to the ED with her  for evaluation of abdominal pain and hypoglycemia. Pauline reports a couple of days of generalized abdominal pain and distention. She states she has not eaten much because she feels she cannot fit food in her stomach.  notes she has also complained of leg pain and back pain. Patient denies vomiting, diarrhea, fever, shortness of breath, lower extremity edema, or urine symptoms such as dysuria. Denies history of liver issues. Patient does state she drinks alcohol frequently, and more recently because of the pain. Her last drink was last night. She is diabetic and last took 26 units of Lantus this morning. Notes she does not test her BG at home.      In the ER she has a low grade temperature of 100, tachycardic with HR 120s, hypertensive 155/110 with normal oxygen saturation.  LABS: significant for Na 135, K 3.4, CO2 10, WBC 10.9, Hgb 10.2, , ALK PHOS 247, , GLC 57, Bili T 2.1, Ketones 3.27, LA 8.5>3.5>2.2, Lipase 25.  US showed hepatic cirrhosis, mild hepatomegaly, moderate ascites.  CT of abd and pelvis showed: cirrhosis, portal hypertension, 4 quadrant ascites, mesenteric congestion with recanalization of paraumbilical veins and incidental 2 mm right kidney stone.  Patient underwent paracentesis which showed 266 nucleated cells with 5% neutrophils, albumin 1.5, total protein of 3.1.  Her last ETOH drink was  about 24 hours.  She denies prior hx of withdrawal or seizures.     In the ER, she received 1L of D5NS, folic acid, Magnesium, Morphine 4 mg, zofran and thiamine.    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  Admit to inpatient  S/P US guided paracentesis with albumin 1.5, Total protein 3.1 with SAAG 2.3 consistent with ascites from portal hypertension, MELD score 15 (6% 3 month mortality)  Ascitic fluid  (> 250) along with temp 100 and increased pain and distention, continue day 2/5  of IV ceftriaxone  2 gram Na diet  Unknown family history as she is adopted  [facility] consultation pending  Continue on spironolactone at 50 mg and titrate up  Change from IV dilaudid to oxycodone     ## Alcohol use disorder  ## Alcohol related ketoacidosis  On CIWA protocol but scoring low 0-1  IV and followed by po vitamins  Gabapentin, clonidine, Valium for anticipated ETOH W/D symptoms  CD evaluation  LA and acidosis improving with hydration and dextrose     ## Hypomagnesemia  ## Hypophosphatemia  ## Borderline low Potassium  ## Prolonged QTc  Metabolic derangements are almost entirely due to decreased oral intake, daily ETOH use.  She denies any N/V or diarrhea prior to admission  K / Mg / Phos replacement protocol  IVF with K supplementation  Replaced K and Mg and repeat EKG showed improvement from 580 >> 514 ms     ## DM type II  ## Hypoglycemia  Patient is on lantus 26 units daily and has had very little po intake in the past few weeks.    A1c is 4.4  Stop Lantus  Stop  Glimepiride,    Stop SSI  Regular diet     ## Tobacco dependence  She normally smoke about 1/2 PPD x 20 years  Smoking cessation consultation  Declines nicotine patch       "  Diet: 2 Gram Sodium Diet    DVT Prophylaxis: Pneumatic Compression Devices  Flores Catheter: Not present  Lines: None     Cardiac Monitoring: None  Code Status: Full Code          Clinically Significant Risk Factors Present on Admission          # Hypochloremia: Lowest Cl = 93 mmol/L in last 2 days, will monitor as appropriate    # Hypomagnesemia: Lowest Mg = 1.3 mg/dL in last 2 days, will replace as needed  # Anion Gap Metabolic Acidosis: Highest Anion Gap = 32 mmol/L in last 2 days, will monitor and treat as appropriate   # Coagulation Defect: INR = 1.43 (Ref range: 0.85 - 1.15) and/or PTT = N/A, will monitor for bleeding         # Anemia: based on hgb <11       Disposition Plan  Medically Ready for Discharge:  -- anticipate on 11/10    Disposition:     Interval History   -- scoring low on CIWA 0-1  -- acidosis improving    -Data reviewed today: I reviewed all new labs and imaging over the last 24 hours. I personally reviewed no images or EKG's today.    Physical Exam    , Blood pressure 132/79, pulse 105, temperature 98.9  F (37.2  C), temperature source Oral, resp. rate 18, height 1.676 m (5' 6\"), weight 80.1 kg (176 lb 8 oz), SpO2 95%, not currently breastfeeding.  Vitals:    11/08/24 1723 11/09/24 0015   Weight: 68 kg (150 lb) 80.1 kg (176 lb 8 oz)     Vital Signs with Ranges  Temp:  [98.9  F (37.2  C)-100  F (37.8  C)] 98.9  F (37.2  C)  Pulse:  [105-129] 105  Resp:  [10-29] 18  BP: (100-155)/() 132/79  SpO2:  [93 %-99 %] 95 %  I/O's Last 24 hours  I/O last 3 completed shifts:  In: 1050 [I.V.:1000; IV Piggyback:50]  Out: -     Constitutional: Awake, alert, cooperative, no apparent distress  Respiratory: Clear to auscultation bilaterally, no crackles or wheezing  Cardiovascular: Regular rate and rhythm, normal S1 and S2   GI: Normal bowel sounds, soft,  distended, non-tender  Skin/Integumen: No rashes, no cyanosis, no edema  Other:      Medications   All medications were reviewed.  Current " Facility-Administered Medications   Medication Dose Route Frequency Provider Last Rate Last Admin     Current Facility-Administered Medications   Medication Dose Route Frequency Provider Last Rate Last Admin    cefTRIAXone (ROCEPHIN) 1 g vial to attach to  mL bag for ADULTS or NS 50 mL bag for PEDS  1 g Intravenous Q24H Lopez Bardales MD   1 g at 11/09/24 0340    cloNIDine (CATAPRES) tablet 0.1 mg  0.1 mg Oral Q8H Lopez Bardales MD   0.1 mg at 11/09/24 0915    folic acid (FOLVITE) tablet 1 mg  1 mg Oral Daily Lopez Bardales MD   1 mg at 11/09/24 0915    insulin aspart (NovoLOG) injection (RAPID ACTING)  1-7 Units Subcutaneous TID  Lopez Bardales MD        insulin aspart (NovoLOG) injection (RAPID ACTING)  1-5 Units Subcutaneous At Bedtime Lopez Bardales MD        insulin glargine (LANTUS PEN) injection 15 Units  15 Units Subcutaneous QAM  Lopez Bardales MD   15 Units at 11/09/24 1000    multivitamin w/minerals (THERA-VIT-M) tablet 1 tablet  1 tablet Oral Daily Lopez Bardales MD   1 tablet at 11/09/24 0915    sodium chloride (PF) 0.9% PF flush 3 mL  3 mL Intracatheter Q8H Lopez Bardales MD   3 mL at 11/09/24 0918    thiamine (B-1) tablet 100 mg  100 mg Oral Daily Lopez Bardales MD   100 mg at 11/09/24 0915        Data   Recent Labs   Lab 11/09/24  1211 11/09/24  0736 11/09/24  0025 11/08/24  2326 11/08/24  1810 11/08/24  1735   WBC  --  6.2  --   --   --  10.9   HGB  --  8.9*  --   --   --  10.2*   MCV  --  97  --   --   --  99   PLT  --  133*  --   --   --  214   INR  --  1.48*  --   --   --  1.43*   NA  --  134*  --   --   --  135   POTASSIUM  --  3.9 3.6  --   --  3.4   CHLORIDE  --  99  --   --   --  93*   CO2  --  18*  --   --   --  10*   BUN  --  3.6*  --   --   --  3.8*   CR  --  0.45*  --   --   --  0.42*   ANIONGAP  --  17*  --   --   --  32*   ALEX  --  7.6*  --   --   --  8.7*   * 132*  --  97   < > 57*   ALBUMIN  --  3.3*  --   --   --  3.8   3.8   PROTTOTAL  --  7.4  --   --   --  8.4*   BILITOTAL  --  2.9*  --   --   --  2.1*   ALKPHOS  --  214*  --   --   --  247*   ALT  --  26  --   --   --  34   AST  --  167*  --   --   --  223*   LIPASE  --   --   --   --   --  25    < > = values in this interval not displayed.       Recent Results (from the past 24 hours)   US Abdomen Limited    Narrative    EXAM: US ABDOMEN LIMITED  LOCATION: [facility]  DATE: 11/8/2024    INDICATION: right upper quadrant, concern for liver failure vs cholecystitis  COMPARISON: None.  TECHNIQUE: Limited abdominal ultrasound.    FINDINGS:    GALLBLADDER: Normal. No gallstones, wall thickening, or pericholecystic fluid. Negative sonographic Abreu's sign.    BILE DUCTS: No biliary dilatation. The common duct measures 4 mm.    LIVER: Echogenic. Nodular contour. Mildly enlarged. No focal mass.    RIGHT KIDNEY: No hydronephrosis.    PANCREAS: Completely obscured by bowel gas.    Moderate ascites.      Impression    IMPRESSION:  1.  Hepatic cirrhosis and mild hepatomegaly.  2.  Moderate ascites.  3.  No gallstones..       CT Abdomen Pelvis w Contrast    Narrative    EXAM: CT ABDOMEN PELVIS W CONTRAST  LOCATION: [facility]  DATE: 11/8/2024    INDICATION: abdominal distension, diffuse abdominal pain  COMPARISON: Same day abdominal ultrasound  TECHNIQUE: CT scan of the abdomen and pelvis was performed following injection of IV contrast. Multiplanar reformats were obtained. Dose reduction techniques were used.  CONTRAST: 75 mL Isovue 370    FINDINGS:   LOWER CHEST: Symmetric atelectasis near the diaphragmatic pleura in both lung bases.    HEPATOBILIARY: Mild hepatomegaly. Heterogeneous attenuation liver with nodular border consistent with cirrhosis. No hepatic parenchymal lesions no calcified gallstones. There is nonspecific edema in the gallbladder fossa. No bile duct enlargement.    PANCREAS: Normal.    SPLEEN: Normal.    ADRENAL  GLANDS: Normal.    KIDNEYS/BLADDER: 2 to 3 mm nonobstructing calculus within a right lower pole renal calyx. Kidneys are normal in size with symmetric enhancement and normal cortex thickness. No hydronephrosis or hydroureter. Urinary bladder is mostly decompressed.    BOWEL: Moderate four-quadrant volume abdominal ascites. Associated centralization of the bowel. No dilated bowel. Hazy attenuation throughout the mesentery consistent with mesenteric congestion. Associated mild diffuse wall thickening of the large and   small bowel. No peritoneal thickening/enhancement.    LYMPH NODES: Normal.    VASCULATURE: Minimal aortoiliac atheromatous calcifications. No aneurysm. Recanalized paraumbilical vein.    PELVIC ORGANS: There are several small uterine fibroids. Benign calcification in the left ovary.    MUSCULOSKELETAL: Small low lumbar degenerative osteophytes. Vacuum disc phenomenon at L5-S1.      Impression    IMPRESSION:     1.  Cirrhosis with portal hypertension, moderate four-quadrant abdominal ascites, mesenteric congestion and recanalized paraumbilical vein.  2.  Solitary 2 mm nonobstructing calculus lower pole right kidney.       Lopez Bardales MD  Text Page  (7am to 6pm)

## 2024-11-09 NOTE — H&P
[facility]    History and Physical - Hospitalist Service       Date of Admission:  11/8/2024    Assessment & Plan   Pauline Herrera is a 48 year old female with long standing history of almost daily alcohol use (18 months), tobacco use (1/2 PPD), currently unemployed with additional medical history including DM type II, HTN now presents to [facility] ER with the following history:    Pauline Herrera is a 48 year old female with history of type 2 diabetes mellitus, diabetic ketoacidosis, alcohol use disorder, alcoholic ketoacidosis, ANAID, and hypertension who presents to the ED with her  for evaluation of abdominal pain and hypoglycemia. Pauline reports a couple of days of generalized abdominal pain and distention. She states she has not eaten much because she feels she cannot fit food in her stomach.  notes she has also complained of leg pain and back pain. Patient denies vomiting, diarrhea, fever, shortness of breath, lower extremity edema, or urine symptoms such as dysuria. Denies history of liver issues. Patient does state she drinks alcohol frequently, and more recently because of the pain. Her last drink was last night. She is diabetic and last took 26 units of Lantus this morning. Notes she does not test her BG at home.     In the ER she has a low grade temperature of 100, tachycardic with HR 120s, hypertensive 155/110 with normal oxygen saturation.  LABS: significant for Na 135, K 3.4, CO2 10, WBC 10.9, Hgb 10.2, , ALK PHOS 247, , GLC 57, Bili T 2.1, Ketones 3.27, LA 8.5>3.5>2.2, Lipase 25.  US showed hepatic cirrhosis, mild hepatomegaly, moderate ascites.  CT of abd and pelvis showed: cirrhosis, portal hypertension, 4 quadrant ascites, mesenteric congestion with recanalization of paraumbilical veins and incidental 2 mm right kidney stone.  Patient underwent paracentesis which showed 266 nucleated cells with 5% neutrophils, albumin 1.5, total protein of 3.1.  Her  last ETOH drink was about 24 hours.  She denies prior hx of withdrawal or seizures.    In the ER, she received 1L of D5NS, folic acid, Magnesium, Morphine 4 mg, zofran and thiamine.    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  Admit to inpatient  S/P US guided paracentesis with albumin 1.5, Total protein 3.1 with SAAG 2.3 consistent with ascites from portal hypertension, MELD score 15 (6% 3 month mortality)  Ascitic fluid  (> 250) along with temp 100 and increased pain and distention, will treat with 5 days of IV ceftriaxone  2 gram Na diet  Unknown family history as she is adopted  [facility] consultation  Initiation of spironolactone at 25 mg and titrate    ## Alcohol use disorder  ## Alcohol related ketoacidosis  Start CIWA protocol  IV and followed by po vitamins  Gabapentin, clonidine, Valium for anticipated ETOH W/D symptoms  CD evaluation  LA is improving, follow ketones    ## Hypomagnesemia  ## Hypophosphatemia  ## Borderline low Potassium  ## Prolonged QTc  Metabolic derangements are almost entirely due to decreased oral intake, daily ETOH use.  She denies any N/V or diarrhea prior to admission  K / Mg / Phos replacement protocol  IVF with K supplementation  Replace K and Mg and repeat EKG    ## DM type II  ## Hypoglycemia  Patient is on lantus 26 units daily and has had very little po intake in the past few weeks.    Decrease lantus to 15 units daily  Hold Glimeperide  D5 NS with 20 KCl at 50 ml hr x 12  hours  Mod cho diet  Sliding scale insulin low intensity    ## Tobacco dependence  She normally smoke about 1/2 PPD x 20 years  Smoking cessation consultation  Declines nicotine patch      Diet: 2 Gram Sodium Diet    DVT Prophylaxis:  Pneumatic Compression Devices  Flores Catheter: Not present  Lines: None     Cardiac Monitoring: None  Code Status: Full Code      Clinically Significant Risk Factors Present on Admission          # Hypochloremia: Lowest Cl = 93 mmol/L in last 2 days, will monitor as appropriate    # Hypomagnesemia: Lowest Mg = 1.3 mg/dL in last 2 days, will replace as needed  # Anion Gap Metabolic Acidosis: Highest Anion Gap = 32 mmol/L in last 2 days, will monitor and treat as appropriate   # Coagulation Defect: INR = 1.43 (Ref range: 0.85 - 1.15) and/or PTT = N/A, will monitor for bleeding         # Anemia: based on hgb <11        Disposition Plan     Medically Ready for Discharge: Anticipated in 2-4 Days           Lopez Bardales MD  Hospitalist Service  [facility]  Securely message with [employer] (more info)  Text page via [facility] Paging/Directory     ______________________________________________________________________    Chief Complaint   Abdominal pain, distention, bloating    History is obtained from the patient, electronic health record, and emergency department physician    History of Present Illness   Pauline Herrera is a 48 year old female with long standing history of almost daily alcohol use (18 months), tobacco use (1/2 PPD), currently unemployed with additional medical history including DM type II, HTN now presents to [facility] ER with the following history:    Pauline Herrera is a 48 year old female with history of type 2 diabetes mellitus, diabetic ketoacidosis, alcohol use disorder, alcoholic ketoacidosis, ANAID, and hypertension who presents to the ED with her  for evaluation of abdominal pain and hypoglycemia. Pauline reports a couple of days of generalized abdominal pain and distention. She states she has not eaten much because she feels she cannot fit food in her stomach.  notes she has also complained of leg pain and back pain. Patient denies vomiting, diarrhea, fever, shortness  of breath, lower extremity edema, or urine symptoms such as dysuria. Denies history of liver issues. Patient does state she drinks alcohol frequently, and more recently because of the pain. Her last drink was last night. She is diabetic and last took 26 units of Lantus this morning. Notes she does not test her BG at home.     In the ER she has a low grade temperature of 100, tachycardic with HR 120s, hypertensive 155/110 with normal oxygen saturation.  LABS: significant for Na 135, K 3.4, CO2 10, WBC 10.9, Hgb 10.2, , ALK PHOS 247, , GLC 57, Bili T 2.1, Ketones 3.27, LA 8.5>3.5>2.2, Lipase 25.  US showed hepatic cirrhosis, mild hepatomegaly, moderate ascites.  CT of abd and pelvis showed: cirrhosis, portal hypertension, 4 quadrant ascites, mesenteric congestion with recanalization of paraumbilical veins and incidental 2 mm right kidney stone.  Patient underwent paracentesis which showed 266 nucleated cells with 5% neutrophils, albumin 1.5, total protein of 3.1.  Her last ETOH drink was about 24 hours.  She denies prior hx of withdrawal or seizures.    In the ER, she received 1L of D5NS, folic acid, Magnesium, Morphine 4 mg, zofran and thiamine    Past Medical History    Past Medical History:   Diagnosis Date    BMI 31.0-31.9,adult     Dermatitis        Past Surgical History   Past Surgical History:   Procedure Laterality Date    HC TOOTH EXTRACTION W/FORCEP         Prior to Admission Medications   Prior to Admission Medications   Prescriptions Last Dose Informant Patient Reported? Taking?   LANTUS SOLOSTAR 100 UNIT/ML soln 11/8/2024  Yes Yes   Sig: Inject 26 Units subcutaneously every morning.   glimepiride (AMARYL) 1 MG tablet 11/8/2024  Yes Yes   Sig: Take 1 mg by mouth every morning (before breakfast).   traZODone (DESYREL) 50 MG tablet 11/7/2024  Yes Yes   Sig: Take 50 mg by mouth at bedtime.      Facility-Administered Medications: None           Physical Exam   Vital Signs: Temp: 100  F (37.8  C)  Temp src: Oral BP: (!) 155/110 Pulse: (!) 123   Resp: 18 SpO2: 95 % O2 Device: None (Room air)    Weight: 150 lbs 0 oz    General Appearance: Flat affect, NAD, AxOx3  Respiratory: CTA  Cardiovascular: Tachycardic, no murmurs  GI: distended, congested umbilical veins, non tender, + ascites  Skin: warm and dry, mild icterus  Other: mild or trace edema     Medical Decision Making     71 MINUTES SPENT BY ME on the date of service doing chart review, history, exam, documentation & further activities per the note.      Data     I have personally reviewed the following data over the past 24 hrs:    10.9  \   10.2 (L)   / 214     135 93 (L) 3.8 (L) /  97   3.4 10 (LL) 0.42 (L) \     ALT: 34 AST: 223 (H) AP: 247 (H) TBILI: 2.1 (H)   ALB: 3.8; 3.8 TOT PROTEIN: 8.4 (H) LIPASE: 25     Procal: N/A CRP: N/A Lactic Acid: 2.2 (H)       INR:  1.43 (H) PTT:  N/A   D-dimer:  N/A Fibrinogen:  N/A       Imaging results reviewed over the past 24 hrs:   Recent Results (from the past 24 hours)   US Abdomen Limited    Narrative    EXAM: US ABDOMEN LIMITED  LOCATION: [facility]  DATE: 11/8/2024    INDICATION: right upper quadrant, concern for liver failure vs cholecystitis  COMPARISON: None.  TECHNIQUE: Limited abdominal ultrasound.    FINDINGS:    GALLBLADDER: Normal. No gallstones, wall thickening, or pericholecystic fluid. Negative sonographic Abreu's sign.    BILE DUCTS: No biliary dilatation. The common duct measures 4 mm.    LIVER: Echogenic. Nodular contour. Mildly enlarged. No focal mass.    RIGHT KIDNEY: No hydronephrosis.    PANCREAS: Completely obscured by bowel gas.    Moderate ascites.      Impression    IMPRESSION:  1.  Hepatic cirrhosis and mild hepatomegaly.  2.  Moderate ascites.  3.  No gallstones..       CT Abdomen Pelvis w Contrast    Narrative    EXAM: CT ABDOMEN PELVIS W CONTRAST  LOCATION: [facility]  DATE: 11/8/2024    INDICATION: abdominal distension, diffuse  abdominal pain  COMPARISON: Same day abdominal ultrasound  TECHNIQUE: CT scan of the abdomen and pelvis was performed following injection of IV contrast. Multiplanar reformats were obtained. Dose reduction techniques were used.  CONTRAST: 75 mL Isovue 370    FINDINGS:   LOWER CHEST: Symmetric atelectasis near the diaphragmatic pleura in both lung bases.    HEPATOBILIARY: Mild hepatomegaly. Heterogeneous attenuation liver with nodular border consistent with cirrhosis. No hepatic parenchymal lesions no calcified gallstones. There is nonspecific edema in the gallbladder fossa. No bile duct enlargement.    PANCREAS: Normal.    SPLEEN: Normal.    ADRENAL GLANDS: Normal.    KIDNEYS/BLADDER: 2 to 3 mm nonobstructing calculus within a right lower pole renal calyx. Kidneys are normal in size with symmetric enhancement and normal cortex thickness. No hydronephrosis or hydroureter. Urinary bladder is mostly decompressed.    BOWEL: Moderate four-quadrant volume abdominal ascites. Associated centralization of the bowel. No dilated bowel. Hazy attenuation throughout the mesentery consistent with mesenteric congestion. Associated mild diffuse wall thickening of the large and   small bowel. No peritoneal thickening/enhancement.    LYMPH NODES: Normal.    VASCULATURE: Minimal aortoiliac atheromatous calcifications. No aneurysm. Recanalized paraumbilical vein.    PELVIC ORGANS: There are several small uterine fibroids. Benign calcification in the left ovary.    MUSCULOSKELETAL: Small low lumbar degenerative osteophytes. Vacuum disc phenomenon at L5-S1.      Impression    IMPRESSION:     1.  Cirrhosis with portal hypertension, moderate four-quadrant abdominal ascites, mesenteric congestion and recanalized paraumbilical vein.  2.  Solitary 2 mm nonobstructing calculus lower pole right kidney.

## 2024-11-09 NOTE — ED NOTES
[facility]  ED Nurse Handoff Report    ED Chief complaint: Abdominal Pain and Hypoglycemia      ED Diagnosis:   Final diagnoses:   Ketosis (H)   Generalized abdominal pain   Alcohol use disorder   Transaminitis   Alcoholic cirrhosis of liver with ascites (H)   Alcohol dependence with uncomplicated withdrawal (H)       Code Status: for hospitlist to address.    Allergies: No Known Allergies    Patient Story: Presents to the ED with her  for evaluation of 2 days of abdominal pain, distension and hypoglycemia. She states she has not eaten much because she feels she cannot fit food in her stomach.  notes she has also complained of leg pain and back pain. Denies history of liver issues. Patient does state she drinks alcohol frequently, and more recently because of the pain. Her last drink was last night.     Hx. DM2, DKA, alcohol use disorder, alcoholic ketoacidosis, ANAID, and HTN.    Focused Assessment:  ambulatory, A&Ox4, respirations even and unlabored. Restless, tachycardic, distended and guarding abdomen. Skin dry, warm, pale.   Results for orders placed or performed during the hospital encounter of 11/08/24   CT Abdomen Pelvis w Contrast     Status: None    Narrative    EXAM: CT ABDOMEN PELVIS W CONTRAST  LOCATION: [facility]  DATE: 11/8/2024    INDICATION: abdominal distension, diffuse abdominal pain  COMPARISON: Same day abdominal ultrasound  TECHNIQUE: CT scan of the abdomen and pelvis was performed following injection of IV contrast. Multiplanar reformats were obtained. Dose reduction techniques were used.  CONTRAST: 75 mL Isovue 370    FINDINGS:   LOWER CHEST: Symmetric atelectasis near the diaphragmatic pleura in both lung bases.    HEPATOBILIARY: Mild hepatomegaly. Heterogeneous attenuation liver with nodular border consistent with cirrhosis. No hepatic parenchymal lesions no calcified gallstones. There is nonspecific edema in the gallbladder fossa. No  bile duct enlargement.    PANCREAS: Normal.    SPLEEN: Normal.    ADRENAL GLANDS: Normal.    KIDNEYS/BLADDER: 2 to 3 mm nonobstructing calculus within a right lower pole renal calyx. Kidneys are normal in size with symmetric enhancement and normal cortex thickness. No hydronephrosis or hydroureter. Urinary bladder is mostly decompressed.    BOWEL: Moderate four-quadrant volume abdominal ascites. Associated centralization of the bowel. No dilated bowel. Hazy attenuation throughout the mesentery consistent with mesenteric congestion. Associated mild diffuse wall thickening of the large and   small bowel. No peritoneal thickening/enhancement.    LYMPH NODES: Normal.    VASCULATURE: Minimal aortoiliac atheromatous calcifications. No aneurysm. Recanalized paraumbilical vein.    PELVIC ORGANS: There are several small uterine fibroids. Benign calcification in the left ovary.    MUSCULOSKELETAL: Small low lumbar degenerative osteophytes. Vacuum disc phenomenon at L5-S1.      Impression    IMPRESSION:     1.  Cirrhosis with portal hypertension, moderate four-quadrant abdominal ascites, mesenteric congestion and recanalized paraumbilical vein.  2.  Solitary 2 mm nonobstructing calculus lower pole right kidney.   US Abdomen Limited     Status: None    Narrative    EXAM: US ABDOMEN LIMITED  LOCATION: [facility]  DATE: 11/8/2024    INDICATION: right upper quadrant, concern for liver failure vs cholecystitis  COMPARISON: None.  TECHNIQUE: Limited abdominal ultrasound.    FINDINGS:    GALLBLADDER: Normal. No gallstones, wall thickening, or pericholecystic fluid. Negative sonographic Abreu's sign.    BILE DUCTS: No biliary dilatation. The common duct measures 4 mm.    LIVER: Echogenic. Nodular contour. Mildly enlarged. No focal mass.    RIGHT KIDNEY: No hydronephrosis.    PANCREAS: Completely obscured by bowel gas.    Moderate ascites.      Impression    IMPRESSION:  1.  Hepatic cirrhosis and mild  hepatomegaly.  2.  Moderate ascites.  3.  No gallstones..       Comprehensive metabolic panel     Status: Abnormal   Result Value Ref Range    Sodium 135 135 - 145 mmol/L    Potassium 3.4 3.4 - 5.3 mmol/L    Carbon Dioxide (CO2) 10 (LL) 22 - 29 mmol/L    Anion Gap 32 (H) 7 - 15 mmol/L    Urea Nitrogen 3.8 (L) 6.0 - 20.0 mg/dL    Creatinine 0.42 (L) 0.51 - 0.95 mg/dL    GFR Estimate >90 >60 mL/min/1.73m2    Calcium 8.7 (L) 8.8 - 10.4 mg/dL    Chloride 93 (L) 98 - 107 mmol/L    Glucose 57 (L) 70 - 99 mg/dL    Alkaline Phosphatase 247 (H) 40 - 150 U/L     (H) 0 - 45 U/L    ALT 34 0 - 50 U/L    Protein Total 8.4 (H) 6.4 - 8.3 g/dL    Albumin 3.8 3.5 - 5.2 g/dL    Bilirubin Total 2.1 (H) <=1.2 mg/dL   Lipase     Status: Normal   Result Value Ref Range    Lipase 25 13 - 60 U/L   [place] Draw     Status: None    Narrative    The following orders were created for panel order [place] Draw.  Procedure                               Abnormality         Status                     ---------                               -----------         ------                     Extra Blue Top Tube[[id number]]                              Final result               Extra Red Top Tube[[id number]]                               Final result                 Please view results for these tests on the individual orders.   Glucose by meter     Status: Abnormal   Result Value Ref Range    GLUCOSE BY METER POCT 58 (L) 70 - 99 mg/dL   CBC with platelets and differential     Status: Abnormal   Result Value Ref Range    WBC Count 10.9 4.0 - 11.0 10e3/uL    RBC Count 3.25 (L) 3.80 - 5.20 10e6/uL    Hemoglobin 10.2 (L) 11.7 - 15.7 g/dL    Hematocrit 32.2 (L) 35.0 - 47.0 %    MCV 99 78 - 100 fL    MCH 31.4 26.5 - 33.0 pg    MCHC 31.7 31.5 - 36.5 g/dL    RDW 18.1 (H) 10.0 - 15.0 %    Platelet Count 214 150 - 450 10e3/uL    % Neutrophils 76 %    % Lymphocytes 14 %    % Monocytes 8 %    % Eosinophils 1 %    % Basophils 1 %    % Immature Granulocytes 0 %     NRBCs per 100 WBC 0 <1 /100    Absolute Neutrophils 8.3 1.6 - 8.3 10e3/uL    Absolute Lymphocytes 1.5 0.8 - 5.3 10e3/uL    Absolute Monocytes 0.9 0.0 - 1.3 10e3/uL    Absolute Eosinophils 0.1 0.0 - 0.7 10e3/uL    Absolute Basophils 0.1 0.0 - 0.2 10e3/uL    Absolute Immature Granulocytes 0.0 <=0.4 10e3/uL    Absolute NRBCs 0.0 10e3/uL   Extra Blue Top Tube     Status: None   Result Value Ref Range    Hold Specimen jic    Extra Red Top Tube     Status: None   Result Value Ref Range    Hold Specimen jic    UA with Microscopic reflex to Culture     Status: Abnormal    Specimen: Urine, Midstream   Result Value Ref Range    Color Urine Yellow Colorless, Straw, Light Yellow, Yellow    Appearance Urine Clear Clear    Glucose Urine Negative Negative mg/dL    Bilirubin Urine Negative Negative    Ketones Urine 60 (A) Negative mg/dL    Specific Gravity Urine 1.017 1.003 - 1.035    Blood Urine Negative Negative    pH Urine 6.0 5.0 - 7.0    Protein Albumin Urine 20 (A) Negative mg/dL    Urobilinogen Urine Normal Normal, 2.0 mg/dL    Nitrite Urine Negative Negative    Leukocyte Esterase Urine Negative Negative    Mucus Urine Present (A) None Seen /LPF    RBC Urine <1 <=2 /HPF    WBC Urine 1 <=5 /HPF    Squamous Epithelials Urine 1 <=1 /HPF    Narrative    Urine Culture not indicated   Alcohol level blood     Status: Abnormal   Result Value Ref Range    Alcohol ethyl 0.03 (H) <=0.01 g/dL   Ketone Beta-Hydroxybutyrate Quantitative     Status: Abnormal   Result Value Ref Range    Ketone (Beta-Hydroxybutyrate) Quantitative 3.27 (HH) <=0.30 mmol/L   Glucose by meter     Status: Abnormal   Result Value Ref Range    GLUCOSE BY METER POCT 114 (H) 70 - 99 mg/dL   iStat Gases (lactate) venous, POCT     Status: Abnormal   Result Value Ref Range    Lactic Acid POCT 8.5 (HH) <=2.0 mmol/L    Bicarbonate Venous POCT 15 (L) 21 - 28 mmol/L    O2 Sat, Venous POCT 74 70 - 75 %    pCO2 Venous POCT 26 (L) 40 - 50 mm Hg    pH Venous POCT 7.38 7.32 -  7.43    pO2 Venous POCT 39 25 - 47 mm Hg    Base Excess/Deficit (+/-) POCT -8.0 (L) -3.0 - 3.0 mmol/L   Magnesium     Status: Abnormal   Result Value Ref Range    Magnesium 1.3 (L) 1.7 - 2.3 mg/dL   INR     Status: Abnormal   Result Value Ref Range    INR 1.43 (H) 0.85 - 1.15   Glucose by meter     Status: Abnormal   Result Value Ref Range    GLUCOSE BY METER POCT 63 (L) 70 - 99 mg/dL   iStat Gases (lactate) venous, POCT     Status: Abnormal   Result Value Ref Range    Lactic Acid POCT 3.5 (H) <=2.0 mmol/L    Bicarbonate Venous POCT 19 (L) 21 - 28 mmol/L    O2 Sat, Venous POCT 91 (H) 70 - 75 %    pCO2 Venous POCT 27 (L) 40 - 50 mm Hg    pH Venous POCT 7.45 (H) 7.32 - 7.43    pO2 Venous POCT 57 (H) 25 - 47 mm Hg    Base Excess/Deficit (+/-) POCT -4.0 (L) -3.0 - 3.0 mmol/L   Albumin level     Status: Normal   Result Value Ref Range    Albumin 3.8 3.5 - 5.2 g/dL   EKG 12 lead     Status: None   Result Value Ref Range    Systolic Blood Pressure  mmHg    Diastolic Blood Pressure  mmHg    Ventricular Rate 129 BPM    Atrial Rate  BPM    MI Interval  ms    QRS Duration 70 ms     ms    QTc 580 ms    P Axis  degrees    R AXIS 91 degrees    T Axis 44 degrees    Interpretation ECG       ** Critical Test Result: Long QTc  Accelerated Junctional rhythm  Rightward axis  Abnormal ECG  No previous ECGs available  Confirmed by GENERATED REPORT, COMPUTER ([id number]),  Mandi Hooker ([id number]) on 11/8/2024 6:15:31 PM     CBC with platelets + differential     Status: Abnormal    Narrative    The following orders were created for panel order CBC with platelets + differential.  Procedure                               Abnormality         Status                     ---------                               -----------         ------                     CBC with platelets and d...[[id number]]  Abnormal            Final result                 Please view results for these tests on the individual orders.   Cell count with differential  fluid     Status: None (In process)    Narrative    The following orders were created for panel order Cell count with differential fluid.  Procedure                               Abnormality         Status                     ---------                               -----------         ------                     Cell Count Body Fluid[[id number]]                            In process                 Differential Body Fluid[[id number]]                          In process                   Please view results for these tests on the individual orders.       Treatments and/or interventions provided: paracentesis at bedside  Medications   OLANZapine zydis (zyPREXA) ODT tab 5-10 mg (has no administration in time range)     Or   haloperidol lactate (HALDOL) injection 2.5-5 mg (has no administration in time range)   flumazenil (ROMAZICON) injection 0.2 mg (has no administration in time range)   melatonin tablet 5 mg (has no administration in time range)   diazepam (VALIUM) tablet 10 mg (has no administration in time range)     Or   diazepam (VALIUM) injection 5-10 mg (has no administration in time range)   thiamine (B-1) tablet 100 mg (has no administration in time range)   folic acid (FOLVITE) tablet 1 mg (has no administration in time range)   multivitamin w/minerals (THERA-VIT-M) tablet 1 tablet (has no administration in time range)   sodium chloride 0.9% BOLUS 1,000 mL (0 mLs Intravenous Stopped 11/8/24 1929)   sodium chloride 0.9% BOLUS 1,000 mL (0 mLs Intravenous Stopped 11/8/24 1929)   magnesium sulfate 2 g in 50 mL sterile water intermittent infusion (2 g Intravenous $New Bag 11/8/24 1933)   Saline Flush - CT (62 mLs Intravenous $Given 11/8/24 1915)   iopamidol (ISOVUE-370) solution 75 mL (75 mLs Intravenous $Given 11/8/24 1914)   dextrose 5% and 0.9% NaCl infusion ( Intravenous $New Bag 11/8/24 1957)   morphine (PF) injection 4 mg (4 mg Intravenous $Given 11/8/24 1947)   ondansetron (ZOFRAN) injection 4 mg (4 mg  Intravenous $Given 11/8/24 1947)   fentaNYL (PF) (SUBLIMAZE) injection 25 mcg (25 mcg Intravenous $Given 11/8/24 2032)     Patient's response to treatments and/or interventions: VS improving, comfortably resting in stretcher, pain improved.     To be done/followed up on inpatient unit:  continue with POC    Does this patient have any cognitive concerns?:  n/a    Activity level - Baseline/Home:  Independent  Activity Level - Current:   Independent    Patient's Preferred language: English   Needed?: No    Isolation: None  Infection: Not Applicable  Patient tested for COVID 19 prior to admission: NO  Bariatric?: No    Vital Signs:   Vitals:    11/08/24 2000 11/08/24 2015 11/08/24 2030 11/08/24 2045   BP: 100/73      Pulse: (!) 122 (!) 126 (!) 123 120   Resp: 19 25 25 16   Temp:       TempSrc:       SpO2: 95%      Weight:       Height:           Cardiac Rhythm:     Was the PSS-3 completed:   Yes  What interventions are required if any?    Family Comments:  at bedside  OBS brochure/video discussed/provided to patient/family: N/A  For the majority of the shift this patient's behavior was Green.   Behavioral interventions performed were n/a.    ED NURSE PHONE NUMBER: [phone]

## 2024-11-10 VITALS
BODY MASS INDEX: 29.02 KG/M2 | HEART RATE: 95 BPM | HEIGHT: 66 IN | RESPIRATION RATE: 18 BRPM | TEMPERATURE: 99.9 F | OXYGEN SATURATION: 95 % | DIASTOLIC BLOOD PRESSURE: 59 MMHG | WEIGHT: 180.6 LBS | SYSTOLIC BLOOD PRESSURE: 110 MMHG

## 2024-11-10 LAB
ALBUMIN SERPL BCG-MCNC: 3 G/DL (ref 3.5–5.2)
ALP SERPL-CCNC: 209 U/L (ref 40–150)
ALT SERPL W P-5'-P-CCNC: 23 U/L (ref 0–50)
ANION GAP SERPL CALCULATED.3IONS-SCNC: 10 MMOL/L (ref 7–15)
AST SERPL W P-5'-P-CCNC: 170 U/L (ref 0–45)
BILIRUB SERPL-MCNC: 1.8 MG/DL
BUN SERPL-MCNC: 4.4 MG/DL (ref 6–20)
CALCIUM SERPL-MCNC: 7.6 MG/DL (ref 8.8–10.4)
CHLORIDE SERPL-SCNC: 103 MMOL/L (ref 98–107)
CREAT SERPL-MCNC: 0.51 MG/DL (ref 0.51–0.95)
EGFRCR SERPLBLD CKD-EPI 2021: >90 ML/MIN/1.73M2
ERYTHROCYTE [DISTWIDTH] IN BLOOD BY AUTOMATED COUNT: 18.4 % (ref 10–15)
GLUCOSE BLDC GLUCOMTR-MCNC: 80 MG/DL (ref 70–99)
GLUCOSE SERPL-MCNC: 118 MG/DL (ref 70–99)
HCO3 SERPL-SCNC: 22 MMOL/L (ref 22–29)
HCT VFR BLD AUTO: 27.1 % (ref 35–47)
HGB BLD-MCNC: 9 G/DL (ref 11.7–15.7)
MAGNESIUM SERPL-MCNC: 1.9 MG/DL (ref 1.7–2.3)
MCH RBC QN AUTO: 32.6 PG (ref 26.5–33)
MCHC RBC AUTO-ENTMCNC: 33.2 G/DL (ref 31.5–36.5)
MCV RBC AUTO: 98 FL (ref 78–100)
PHOSPHATE SERPL-MCNC: 2.3 MG/DL (ref 2.5–4.5)
PLATELET # BLD AUTO: 120 10E3/UL (ref 150–450)
POTASSIUM SERPL-SCNC: 3.9 MMOL/L (ref 3.4–5.3)
PROT SERPL-MCNC: 7.1 G/DL (ref 6.4–8.3)
RBC # BLD AUTO: 2.76 10E6/UL (ref 3.8–5.2)
SODIUM SERPL-SCNC: 135 MMOL/L (ref 135–145)
WBC # BLD AUTO: 7.2 10E3/UL (ref 4–11)

## 2024-11-10 PROCEDURE — 250N000013 HC RX MED GY IP 250 OP 250 PS 637: Performed by: INTERNAL MEDICINE

## 2024-11-10 PROCEDURE — 83735 ASSAY OF MAGNESIUM: CPT | Performed by: INTERNAL MEDICINE

## 2024-11-10 PROCEDURE — 84100 ASSAY OF PHOSPHORUS: CPT | Performed by: INTERNAL MEDICINE

## 2024-11-10 PROCEDURE — 99239 HOSP IP/OBS DSCHRG MGMT >30: CPT | Performed by: INTERNAL MEDICINE

## 2024-11-10 PROCEDURE — 85014 HEMATOCRIT: CPT | Performed by: INTERNAL MEDICINE

## 2024-11-10 PROCEDURE — 36415 COLL VENOUS BLD VENIPUNCTURE: CPT | Performed by: INTERNAL MEDICINE

## 2024-11-10 PROCEDURE — 250N000011 HC RX IP 250 OP 636: Performed by: INTERNAL MEDICINE

## 2024-11-10 PROCEDURE — 258N000003 HC RX IP 258 OP 636: Performed by: INTERNAL MEDICINE

## 2024-11-10 PROCEDURE — 80053 COMPREHEN METABOLIC PANEL: CPT | Performed by: INTERNAL MEDICINE

## 2024-11-10 PROCEDURE — 85048 AUTOMATED LEUKOCYTE COUNT: CPT | Performed by: INTERNAL MEDICINE

## 2024-11-10 RX ORDER — OXYCODONE HYDROCHLORIDE 5 MG/1
5 TABLET ORAL EVERY 4 HOURS PRN
Qty: 20 TABLET | Refills: 0 | Status: SHIPPED | OUTPATIENT
Start: 2024-11-10

## 2024-11-10 RX ORDER — MULTIPLE VITAMINS W/ MINERALS TAB 9MG-400MCG
1 TAB ORAL DAILY
Qty: 30 TABLET | Refills: 0 | Status: SHIPPED | OUTPATIENT
Start: 2024-11-11

## 2024-11-10 RX ORDER — FOLIC ACID 1 MG/1
1 TABLET ORAL DAILY
Qty: 30 TABLET | Refills: 0 | Status: SHIPPED | OUTPATIENT
Start: 2024-11-11

## 2024-11-10 RX ORDER — FUROSEMIDE 20 MG/1
20 TABLET ORAL DAILY
Status: DISCONTINUED | OUTPATIENT
Start: 2024-11-10 | End: 2024-11-10 | Stop reason: HOSPADM

## 2024-11-10 RX ORDER — LANOLIN ALCOHOL/MO/W.PET/CERES
100 CREAM (GRAM) TOPICAL DAILY
Qty: 30 TABLET | Refills: 0 | Status: SHIPPED | OUTPATIENT
Start: 2024-11-11

## 2024-11-10 RX ORDER — FUROSEMIDE 20 MG/1
20 TABLET ORAL DAILY
Qty: 30 TABLET | Refills: 0 | Status: SHIPPED | OUTPATIENT
Start: 2024-11-11

## 2024-11-10 RX ORDER — SPIRONOLACTONE 50 MG/1
50 TABLET, FILM COATED ORAL DAILY
Qty: 30 TABLET | Refills: 0 | Status: SHIPPED | OUTPATIENT
Start: 2024-11-11

## 2024-11-10 RX ADMIN — SPIRONOLACTONE 50 MG: 25 TABLET ORAL at 08:27

## 2024-11-10 RX ADMIN — OXYCODONE HYDROCHLORIDE 5 MG: 5 TABLET ORAL at 04:04

## 2024-11-10 RX ADMIN — CLONIDINE HYDROCHLORIDE 0.1 MG: 0.1 TABLET ORAL at 08:27

## 2024-11-10 RX ADMIN — Medication 1 TABLET: at 08:27

## 2024-11-10 RX ADMIN — FOLIC ACID 1 MG: 1 TABLET ORAL at 08:27

## 2024-11-10 RX ADMIN — SODIUM CHLORIDE, PRESERVATIVE FREE: 5 INJECTION INTRAVENOUS at 08:40

## 2024-11-10 RX ADMIN — FUROSEMIDE 20 MG: 20 TABLET ORAL at 11:59

## 2024-11-10 RX ADMIN — Medication 1 PACKET: at 12:41

## 2024-11-10 RX ADMIN — OXYCODONE HYDROCHLORIDE 5 MG: 5 TABLET ORAL at 08:27

## 2024-11-10 RX ADMIN — OXYCODONE HYDROCHLORIDE 5 MG: 5 TABLET ORAL at 12:39

## 2024-11-10 RX ADMIN — THIAMINE HCL TAB 100 MG 100 MG: 100 TAB at 08:27

## 2024-11-10 RX ADMIN — CEFTRIAXONE SODIUM 1 G: 1 INJECTION, POWDER, FOR SOLUTION INTRAMUSCULAR; INTRAVENOUS at 02:25

## 2024-11-10 ASSESSMENT — ACTIVITIES OF DAILY LIVING (ADL)
ADLS_ACUITY_SCORE: 0

## 2024-11-10 NOTE — PLAN OF CARE
11/9 1900 - 11/10 0730    Orientation: A&Ox4  Aggression Stop Light: Green  Activity: SBA  Diet/BS Checks: 2 gm NA diet, BS checks ACHS   Tele:  N/A  IV Access/Drains: L PIV infusing IVF @ 75 ml/hr. R PIV SL.  Pain Management: PRN oxy given 2x for 5-6/10 abd pain  Abnormal VS/Results: VSS on RA ex tachy.  Bowel/Bladder: Continent of B/B. Last BM yesterday (10/9).   Skin/Wounds: Skin intact. Peeling on heels.  Consults: GI, ChemDep, Smoking Cessation  D/C Disposition: Potential discharge today (11/10).    Other Info:   - CIWA q4 hrs, scored 0 throughout shift.  - K+, Mg, Phos protocols. Recheck in AM.

## 2024-11-10 NOTE — PLAN OF CARE
Goal Outcome Evaluation:       Alert and oriented. Vitals stable. Low grade fever of 99.9. Will monitor.  Reporting abdominal pain. Relieved with 5 mg of oxycodone. Tolerates regular diet. Voiding without difficulty. Up in the room independently. CIWA score has been 0. Plans to discharge to home today.

## 2024-11-10 NOTE — PLAN OF CARE
Goal Outcome Evaluation:      Plan of Care Reviewed With: patient    Overall Patient Progress: improvingOverall Patient Progress: improving         Summary ABD pain, Hypomagnesemia, Alcohol liver disease,  Ascites with portal hypertension    Hx  DM type II, HTN almost daily alcohol use (18 months), tobacco use (1/2 PPD)    11/9/2024 [id number]     Orientation A & O x 4     Vitals/Tele VSS on Rm air     Pain managed with PRN oxycodone     CIWA 0, 0     IV Access/drains 1 PIV infusing 75 ml NS , & 1 PIV SL    Diet 2Gm sodium fair appetite   BG 71, 102, 84   K+, Mg, Ph redraw in AM     Mobility SBA     GI/ Continient of B/B no BM this shift     Wound/Skin WNL, no adjustments needed     Consults GI, ChemDep, Smoking Cessation    Discharge Plan home when medically ready     See Flow sheets for assessment

## 2024-11-10 NOTE — CONSULTS
Gastroenterology Consult Note    Pauline Merino MRN#  [id number]   Age:  48 year old YOB: 1975    Date of Admission:  11/8/2024     REASON FOR CONSULT   Alcoholic liver disease     HISTORY OF PRESENT ILLNESS   48 year old  American female with history of diabetes mellitus is seen in GI consultation today for management of alcoholic liver disease at the request of Lopez Bardales MD.    Patient with long-standing history of alcohol dependence with recent increase in intake over the past 2 weeks.  She noted increasing abdominal discomfort and distention over the past few days prompting visit to the emergency room.  Denies any nausea, emesis, typical heartburn, dysphagia, or odynophagia.  Bowel habits at baseline with normal appearing stools.  No overt GI bleeding.  Denies any recent significant weight gain or loss.  No fever, chills, chest pain, dyspnea, or dizziness.    On arrival to emergency room, patient noted to have tachycardia, hypertension, and low grade fever at 100.0 F.  Blood work notable for macrocytic anemia with Hgb 10.2 g/dL that decreased to 8.9 g/dL following IVF hydration and MCV 99.  Mild thrombocytopenia.  Elevated liver chemistry consistent with alcohol abuse.  Abdominal ultrasound and CT imaging both noted cirrhotic appearing liver with moderate volume ascites.  She underwent paracentesis in ER with removal of 1.5 L fluid.  Fluid studies consistent with portal hypertension of hepatic etiology.  Low total PMN.    No prior history of elevated liver chemistry or diagnosed hepatobiliary pathology.  Denies any prior symptoms of jaundice, ascites, or overt GI bleeding.  Social history notable for daily alcohol intake of nearly fifth of whisky bottle.  Tobacco use of 2 cigs/day.  Rare occasions of marijuana use.  No recent or past history of other recreational drug use.  Patient adopted and no known family history.       PAST HISTORY      Past Medical History:    Diagnosis Date    BMI 31.0-31.9,adult     Dermatitis       Past Surgical History:   Procedure Laterality Date    HC TOOTH EXTRACTION W/FORCEP        Family History Social History   Family History   Adopted: Yes   Problem Relation Age of Onset    Medical History Unknown Mother     Social History     Socioeconomic History    Marital status:      Spouse name: Not on file    Number of children: Not on file    Years of education: Not on file    Highest education level: Not on file   Occupational History    Not on file   Tobacco Use    Smoking status: Every Day     Current packs/day: 0.25     Types: Cigarettes    Smokeless tobacco: Current   Substance and Sexual Activity    Alcohol use: Yes    Drug use: Not on file    Sexual activity: Not on file   Other Topics Concern    Not on file   Social History Narrative    ** Merged History Encounter **          Social Drivers of Health     Financial Resource Strain: Low Risk  (11/8/2024)    Financial Resource Strain     Within the past 12 months, have you or your family members you live with been unable to get utilities (heat, electricity) when it was really needed?: No   Food Insecurity: Low Risk  (11/8/2024)    Food Insecurity     Within the past 12 months, did you worry that your food would run out before you got money to buy more?: No     Within the past 12 months, did the food you bought just not last and you didn t have money to get more?: No   Transportation Needs: Low Risk  (11/8/2024)    Transportation Needs     Within the past 12 months, has lack of transportation kept you from medical appointments, getting your medicines, non-medical meetings or appointments, work, or from getting things that you need?: No   Physical Activity: Not on file   Stress: Not on file   Social Connections: Socially Integrated (12/26/2023)    [facility]    Social Connections     Do you often feel lonely or isolated from those around you?:  "0   Interpersonal Safety: Low Risk  (11/8/2024)    Interpersonal Safety     Do you feel physically and emotionally safe where you currently live?: Yes     Within the past 12 months, have you been hit, slapped, kicked or otherwise physically hurt by someone?: No     Within the past 12 months, have you been humiliated or emotionally abused in other ways by your partner or ex-partner?: No   Housing Stability: Low Risk  (11/8/2024)    Housing Stability     Do you have housing? : Yes     Are you worried about losing your housing?: No         MEDICATIONS & ALLERGIES   Medications Prior to Admission   Medication Sig Dispense Refill Last Dose/Taking    glimepiride (AMARYL) 1 MG tablet Take 1 mg by mouth every morning (before breakfast).   11/8/2024    LANTUS SOLOSTAR 100 UNIT/ML soln Inject 26 Units subcutaneously every morning.   11/8/2024    traZODone (DESYREL) 50 MG tablet Take 50 mg by mouth at bedtime.   11/7/2024      No Known Allergies     REVIEW OF SYSTEMS   Comprehensive 10+ points review of systems performed with pertinent as per HPI above.    OBJECTIVE   Vitals /59 (BP Location: Right arm)   Pulse 95   Temp 99.9  F (37.7  C) (Oral)   Resp 18   Ht 1.676 m (5' 6\")   Wt 81.9 kg (180 lb 9.6 oz)   SpO2 95%   BMI 29.15 kg/m          General:   Well-developed middle aged CF in NAD.   HEENT:   NC/AT. MMM. Mildly icteric sclerae.   Lungs:  No respiratory distress.   Heart:  RRR. +S1, S2.    Abdomen:   Soft. Mild distention without any evidence of tense ascites. No tenderness on palpation.   Ext/MS:   No cyanosis or erythema.    Neuro:   No focal deficits noted.    Skin:  No obvious rashes or lesions noted.         LABORATORY AND IMAGING    ELECTROLYTE PANEL   Recent Labs   Lab Test 11/09/24  0736 11/09/24  0025 11/08/24  1735 05/23/22  1551   *  --  135 138   POTASSIUM 3.9 3.6 3.4 4.0   BUN 3.6*  --  3.8* 15  25*   CR 0.45*  --  0.42* 0.59   ALEX 7.6*  --  8.7* 9.8      HEMATOLOGY PANEL   Recent Labs "   Lab Test 11/09/24  0736 11/08/24  1735 12/30/21  0000   WBC 6.2 10.9 7.3   HGB 8.9* 10.2* 14.5   MCV 97 99 95.5   * 214 260   INR 1.48* 1.43*  --       LIVER AND PANCREAS PANEL   Recent Labs   Lab Test 11/09/24  0736 11/08/24  1810 11/08/24  1735 05/23/22  1551   ALBUMIN 3.3*  --  3.8  3.8 4.6   BILITOTAL 2.9*  --  2.1* 0.4   ALT 26  --  34 72*   *  --  223* 47*   PROTEIN  --  20*  --   --    LIPASE  --   --  25  --       I have reviewed the current diagnostic and laboratory tests.     Assessment / Recommendations:     Assessment:  Alcoholic liver disease.  MELD: 18.  Elevated liver chemistry consistent with acute alcoholic hepatitis.  Would defer starting steroids at this time, given clinical stability.  Ascites.  S/p paracentesis on 11/08 on admission.  Fluid studies consistent with portal hypertension of hepatic etiology.  No evidence of SBP.  Started on low dose spironolactone.  Renal function appropriate.  Would add Furosemide to diuretics regimen.  Macrocytic anemia.  No overt GI blood loss.  No prior EGD for variceal surveillance.  No evidence of hepatic encephalopathy.  HCC surveillance up-to-date with abdominal imaging on admission.  Alcohol dependence.      Recommendations:  Extensive discussion regarding alcohol use and recommendation for strict abstinence.  Continue Spironolactone 50 mg once daily.  Start Furosemide 20 mg once daily.  No clear evidence of SBP with fluid PMN less than 250.  Do not feel strongly about antibiotics treatment.  No clear indication to start steroids for management of alcoholic hepatitis.    Outpatient EGD for variceal screening.  Outpatient [facility] liver clinic follow up on discharge.       Total time spent in chart review, direct medical discussion, examination, and documentation was 41 minutes.    Tyrell Vigil MD  [facility]  [phone]  I appreciate the opportunity to participate in the care of this patient.   Please feel free to call me with any  questions or concerns.

## 2024-11-10 NOTE — PLAN OF CARE
Patient discharged at 1:40 PM to Discharged to home  IV was discontinued. Pain at time of discharge was 2/10. Belongings returned to patient.  Discharge instructions and medications reviewed with patient.  Patient verbalized understanding and all questions were answered.  Prescriptions given to patient for Oxycodone.  At time of discharge, patient condition was stable and left the unit 1340 escorted by spouse.  2 Home Insulin Lantus gave back to pt.

## 2024-11-10 NOTE — PROGRESS NOTES
"    Gastroenterology Follow Up Note    Pauline Merino MRN#  [id number]   Age:  48 year old YOB: 1975    Date of Admission:  11/8/2024     Subjective   No acute events overnight.  Seen with nichol at bedside.  Notes mild LUQ and epigastric pain, but otherwise feeling well.  Tolerating diet without any difficulties.  Afebrile and hemodynamically stable.       MEDICATIONS & ALLERGIES   Current medication list reviewed.      No Known Allergies     OBJECTIVE   Vitals /59 (BP Location: Right arm)   Pulse 95   Temp 99.9  F (37.7  C) (Oral)   Resp 18   Ht 1.676 m (5' 6\")   Wt 81.9 kg (180 lb 9.6 oz)   SpO2 95%   BMI 29.15 kg/m          General:   Well-developed middle aged CF in NAD.   HEENT:   NC/AT. MMM. Mildly icteric sclerae.   Lungs:  No respiratory distress.   Heart:  RRR. +S1, S2.    Abdomen:   Soft. Moderate distention without any evidence of tense ascites. No tenderness on palpation.   Ext/MS:   No cyanosis or erythema.    Neuro:   No focal deficits noted.    Skin:  No obvious rashes or lesions noted.         LABORATORY AND IMAGING    ELECTROLYTE PANEL   Recent Labs   Lab Test 11/10/24  1134 11/09/24  0736 11/09/24  0025 11/08/24  1735    134*  --  135   POTASSIUM 3.9 3.9 3.6 3.4   BUN 4.4* 3.6*  --  3.8*   CR 0.51 0.45*  --  0.42*   ALEX 7.6* 7.6*  --  8.7*      HEMATOLOGY PANEL   Recent Labs   Lab Test 11/10/24  1134 11/09/24  0736 11/08/24  1735   WBC 7.2 6.2 10.9   HGB 9.0* 8.9* 10.2*   MCV 98 97 99   * 133* 214   INR  --  1.48* 1.43*      LIVER AND PANCREAS PANEL   Recent Labs   Lab Test 11/10/24  1134 11/09/24  0736 11/08/24  1810 11/08/24  1735   ALBUMIN 3.0* 3.3*  --  3.8  3.8   BILITOTAL 1.8* 2.9*  --  2.1*   ALT 23 26  --  34   * 167*  --  223*   PROTEIN  --   --  20*  --    LIPASE  --   --   --  25      I have reviewed the current diagnostic and laboratory tests.     Assessment / Recommendations:     Assessment:  Alcoholic liver disease.  MELD: " 18.  Elevated liver chemistry consistent with acute alcoholic hepatitis.  Would defer starting steroids at this time, given clinical stability.  Ascites.  S/p paracentesis on 11/08 on admission.  Fluid studies consistent with portal hypertension of hepatic etiology.  No evidence of SBP.  Started on low dose furosemide and spironolactone.    Macrocytic anemia.  No overt GI blood loss.  No prior EGD for variceal surveillance.  No evidence of hepatic encephalopathy.  HCC surveillance up-to-date with abdominal imaging on admission.  Alcohol dependence.      Recommendations:  Extensive discussion regarding alcohol use and recommendation for strict abstinence.  Continue Furosemide 20 mg and Spironolactone 50 mg once daily.  Low sodium diet on discharge.  No clear indication to start steroids for management of alcoholic hepatitis.    Outpatient EGD for variceal screening.  Outpatient [facility] liver clinic follow up on discharge.  Ok to discharge patient to home from GI standpoint.       Total time spent in chart review, direct medical discussion, examination, and documentation was 21 minutes.    Tyrell Vigil MD  [facility]  [phone]  I appreciate the opportunity to participate in the care of this patient.   Please feel free to call me with any questions or concerns.

## 2024-11-10 NOTE — PROGRESS NOTES
[facility]  [address] [facility] [address]    November 10, 2024    To Whom it may concern:    Ms Pauline Merino was admitted at our hospital from 11/8 - 11/10 at our facility.  Please excuse her from her work duties for this period.  She may return to work on 11/13/2024.      Thank you.          Lopez Bardales MD  [facility]

## 2024-11-10 NOTE — DISCHARGE SUMMARY
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    Weight as of this encounter: 81.9 kg (180 lb 9.6 oz).       Follow-ups Needed After Discharge   Follow-up Appointments       Follow-up and recommended labs and tests       Follow up with [facility] in the next few weeks as directed    Follow up with outpatient CD evaluation and tx            {Additional follow-up instructions/to-do's for PCP  endocrinology and GI    Unresulted Labs Ordered in the Past 30 Days of this Admission       Date and Time Order Name Status Description    11/8/2024  8:27 PM Ascites Fluid Aerobic Bacterial Culture Routine With Gram Stain Preliminary     11/8/2024  5:54 PM iStat Gases (lactate) venous, POCT In process         These results will be followed up by PCP, GI and endocrinology    Discharge Disposition   Discharged to home  Condition at discharge: Stable    Hospital Course   Pauline Herrera is a 48 year old female with long standing history of almost daily alcohol use (18 months), tobacco use (1/2 PPD), currently unemployed with additional medical history including DM type II, HTN now presents to [facility] ER with the following " history:     Pauline Herrera is a 48 year old female with history of type 2 diabetes mellitus, diabetic ketoacidosis, alcohol use disorder, alcoholic ketoacidosis, ANAID, and hypertension who presents to the ED with her  for evaluation of abdominal pain and hypoglycemia. Pauline reports a couple of days of generalized abdominal pain and distention. She states she has not eaten much because she feels she cannot fit food in her stomach.  notes she has also complained of leg pain and back pain. Patient denies vomiting, diarrhea, fever, shortness of breath, lower extremity edema, or urine symptoms such as dysuria. Denies history of liver issues. Patient does state she drinks alcohol frequently, and more recently because of the pain. Her last drink was last night. She is diabetic and last took 26 units of Lantus this morning. Notes she does not test her BG at home.      In the ER she has a low grade temperature of 100, tachycardic with HR 120s, hypertensive 155/110 with normal oxygen saturation.  LABS: significant for Na 135, K 3.4, CO2 10, WBC 10.9, Hgb 10.2, , ALK PHOS 247, , GLC 57, Bili T 2.1, Ketones 3.27, LA 8.5>3.5>2.2, Lipase 25.  US showed hepatic cirrhosis, mild hepatomegaly, moderate ascites.  CT of abd and pelvis showed: cirrhosis, portal hypertension, 4 quadrant ascites, mesenteric congestion with recanalization of paraumbilical veins and incidental 2 mm right kidney stone.  Patient underwent paracentesis which showed 266 nucleated cells with 5% neutrophils, albumin 1.5, total protein of 3.1.  Her last ETOH drink was about 24 hours.  She denies prior hx of withdrawal or seizures.     In the ER, she received 1L of D5NS, folic acid, Magnesium, Morphine 4 mg, zofran and thiamine.    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  Admit to inpatient  S/P US guided paracentesis with albumin 1.5, Total protein 3.1 with SAAG 2.3 consistent with ascites from portal hypertension, MELD score 15 (6% 3 month mortality)  Ascitic fluid  (> 250) had 3 days of IV antibiotics  2 gram Na diet  Unknown family history as she is adopted  [facility] consultation appreciated, outpatient follow up  Continue on spironolactone at 50 mg and lasix 20 mg daily  Change to oxycodone at discharge     ## Alcohol use disorder  ## Alcohol related ketoacidosis  On CIWA protocol but scoring low 0-1  IV and followed by po vitamins  Gabapentin, clonidine, Valium for anticipated ETOH W/D symptoms  CD evaluation  LA and acidosis improving with hydration and dextrose     ## Hypomagnesemia  ## Hypophosphatemia  ## Borderline low Potassium  ## Prolonged QTc  Metabolic derangements are almost entirely due to decreased oral intake, daily ETOH use.  She denies any N/V or diarrhea prior to admission  K / Mg / Phos replacement protocol  IVF with K supplementation  Replaced K and Mg and repeat EKG showed improvement from 580 >> 514 ms     ## DM type II  ## Hypoglycemia  Patient is on lantus 26 units daily and has had very little po intake in the past few weeks.    A1c is 4.4  Stop Lantus  Stop  Glimepiride,    Stop SSI  Regular diet     ## Tobacco dependence  She normally smoke about 1/2 PPD x 20 years  Smoking cessation consultation  Declines nicotine patch        Diet: 2 Gram Sodium Diet    DVT Prophylaxis: Pneumatic Compression Devices  Flores Catheter: Not present  Lines: None     Cardiac Monitoring: None  Code Status: Full Code          Clinically Significant Risk Factors Present on Admission          # Hypochloremia: Lowest Cl = 93 mmol/L in last 2 days, will monitor as appropriate    # Hypomagnesemia: Lowest Mg = 1.3 mg/dL in last 2 days, will  replace as needed  # Anion Gap Metabolic Acidosis: Highest Anion Gap = 32 mmol/L in last 2 days, will monitor and treat as appropriate   # Coagulation Defect: INR = 1.43 (Ref range: 0.85 - 1.15) and/or PTT = N/A, will monitor for bleeding         # Anemia: based on hgb <11       Disposition Plan  Medically Ready for Discharge:  -- anticipate on 11/10    Disposition:     Consultations This Hospital Stay   SMOKING CESSATION PROGRAM IP CONSULT  CHEMICAL DEPENDENCY IP CONSULT  GASTROENTEROLOGY IP CONSULT    Code Status   Full Code    Time Spent on this Encounter   I, Lopez Bardales MD, personally saw the patient today and spent greater than 30 minutes discharging this patient.       Lopez Bardales MD  [facility] [address] [facility]  [address] [place] [address], [address]  [place] [address]  Phone: [phone]  ______________________________________________________________________    Physical Exam   Vital Signs: Temp: 99.9  F (37.7  C) Temp src: Oral BP: 110/59 Pulse: 95   Resp: 18 SpO2: 95 % O2 Device: None (Room air)    Weight: 180 lbs 9.6 oz         Primary Care Physician   Provider Not In System    Discharge Orders      Reason for your hospital stay    Admit with new dx of liver cirrhosis and ascites     Follow-up and recommended labs and tests     Follow up with [facility] in the next few weeks as directed    Follow up with outpatient CD evaluation and tx     Activity    Your activity upon discharge: activity as tolerated     Diet    Follow this diet upon discharge: Current Diet:Orders Placed This Encounter      2 Gram Sodium Diet       Significant Results and Procedures   Most Recent 3 CBC's:  Recent Labs   Lab Test 11/10/24  1134 11/09/24  0736 11/08/24  1735   WBC 7.2 6.2 10.9   HGB 9.0* 8.9* 10.2*   MCV 98 97 99   * 133* 214     Most Recent 3 BMP's:  Recent Labs   Lab Test 11/10/24  1134 11/10/24  0213 11/09/24  2140 11/09/24  1211 11/09/24  0736 11/09/24  0025 11/08/24  1810 11/08/24  1735     --    --   --  134*  --   --  135   POTASSIUM 3.9  --   --   --  3.9 3.6  --  3.4   CHLORIDE 103  --   --   --  99  --   --  93*   CO2 22  --   --   --  18*  --   --  10*   BUN 4.4*  --   --   --  3.6*  --   --  3.8*   CR 0.51  --   --   --  0.45*  --   --  0.42*   ANIONGAP 10  --   --   --  17*  --   --  32*   ALEX 7.6*  --   --   --  7.6*  --   --  8.7*   * 80 84   < > 132*  --    < > 57*    < > = values in this interval not displayed.   ,   Results for orders placed or performed during the hospital encounter of 11/08/24   CT Abdomen Pelvis w Contrast    Narrative    EXAM: CT ABDOMEN PELVIS W CONTRAST  LOCATION: [facility]  DATE: 11/8/2024    INDICATION: abdominal distension, diffuse abdominal pain  COMPARISON: Same day abdominal ultrasound  TECHNIQUE: CT scan of the abdomen and pelvis was performed following injection of IV contrast. Multiplanar reformats were obtained. Dose reduction techniques were used.  CONTRAST: 75 mL Isovue 370    FINDINGS:   LOWER CHEST: Symmetric atelectasis near the diaphragmatic pleura in both lung bases.    HEPATOBILIARY: Mild hepatomegaly. Heterogeneous attenuation liver with nodular border consistent with cirrhosis. No hepatic parenchymal lesions no calcified gallstones. There is nonspecific edema in the gallbladder fossa. No bile duct enlargement.    PANCREAS: Normal.    SPLEEN: Normal.    ADRENAL GLANDS: Normal.    KIDNEYS/BLADDER: 2 to 3 mm nonobstructing calculus within a right lower pole renal calyx. Kidneys are normal in size with symmetric enhancement and normal cortex thickness. No hydronephrosis or hydroureter. Urinary bladder is mostly decompressed.    BOWEL: Moderate four-quadrant volume abdominal ascites. Associated centralization of the bowel. No dilated bowel. Hazy attenuation throughout the mesentery consistent with mesenteric congestion. Associated mild diffuse wall thickening of the large and   small bowel. No peritoneal  thickening/enhancement.    LYMPH NODES: Normal.    VASCULATURE: Minimal aortoiliac atheromatous calcifications. No aneurysm. Recanalized paraumbilical vein.    PELVIC ORGANS: There are several small uterine fibroids. Benign calcification in the left ovary.    MUSCULOSKELETAL: Small low lumbar degenerative osteophytes. Vacuum disc phenomenon at L5-S1.      Impression    IMPRESSION:     1.  Cirrhosis with portal hypertension, moderate four-quadrant abdominal ascites, mesenteric congestion and recanalized paraumbilical vein.  2.  Solitary 2 mm nonobstructing calculus lower pole right kidney.   US Abdomen Limited    Narrative    EXAM: US ABDOMEN LIMITED  LOCATION: [facility]  DATE: 11/8/2024    INDICATION: right upper quadrant, concern for liver failure vs cholecystitis  COMPARISON: None.  TECHNIQUE: Limited abdominal ultrasound.    FINDINGS:    GALLBLADDER: Normal. No gallstones, wall thickening, or pericholecystic fluid. Negative sonographic Abreu's sign.    BILE DUCTS: No biliary dilatation. The common duct measures 4 mm.    LIVER: Echogenic. Nodular contour. Mildly enlarged. No focal mass.    RIGHT KIDNEY: No hydronephrosis.    PANCREAS: Completely obscured by bowel gas.    Moderate ascites.      Impression    IMPRESSION:  1.  Hepatic cirrhosis and mild hepatomegaly.  2.  Moderate ascites.  3.  No gallstones..           Discharge Medications   Current Discharge Medication List        START taking these medications    Details   folic acid (FOLVITE) 1 MG tablet Take 1 tablet (1 mg) by mouth daily.  Qty: 30 tablet, Refills: 0    Associated Diagnoses: Alcoholic cirrhosis of liver with ascites (H)      furosemide (LASIX) 20 MG tablet Take 1 tablet (20 mg) by mouth daily.  Qty: 30 tablet, Refills: 0    Associated Diagnoses: Alcoholic cirrhosis of liver with ascites (H)      multivitamin w/minerals (THERA-VIT-M) tablet Take 1 tablet by mouth daily.  Qty: 30 tablet, Refills: 0    Associated  Diagnoses: Alcoholic cirrhosis of liver with ascites (H)      oxyCODONE (ROXICODONE) 5 MG tablet Take 1 tablet (5 mg) by mouth every 4 hours as needed for moderate pain.  Qty: 20 tablet, Refills: 0    Associated Diagnoses: Alcoholic cirrhosis of liver with ascites (H)      spironolactone (ALDACTONE) 50 MG tablet Take 1 tablet (50 mg) by mouth daily.  Qty: 30 tablet, Refills: 0    Associated Diagnoses: Alcoholic cirrhosis of liver with ascites (H)      thiamine (B-1) 100 MG tablet Take 1 tablet (100 mg) by mouth daily.  Qty: 30 tablet, Refills: 0    Associated Diagnoses: Alcoholic cirrhosis of liver with ascites (H)           CONTINUE these medications which have NOT CHANGED    Details   traZODone (DESYREL) 50 MG tablet Take 50 mg by mouth at bedtime.           STOP taking these medications       glimepiride (AMARYL) 1 MG tablet Comments:   Reason for Stopping:         LANTUS SOLOSTAR 100 UNIT/ML soln Comments:   Reason for Stopping:             Allergies   No Known Allergies

## 2024-11-13 LAB
BACTERIA FLD CULT: NO GROWTH
GRAM STAIN RESULT: NORMAL
GRAM STAIN RESULT: NORMAL

## 2025-02-23 ENCOUNTER — HEALTH MAINTENANCE LETTER (OUTPATIENT)
Age: 50
End: 2025-02-23

## 2025-03-15 ENCOUNTER — TRANSFERRED RECORDS (OUTPATIENT)
Dept: MULTI SPECIALTY CLINIC | Facility: CLINIC | Age: 50
End: 2025-03-15
Payer: COMMERCIAL

## 2025-03-15 LAB — RETINOPATHY: NORMAL

## 2025-03-22 ENCOUNTER — APPOINTMENT (OUTPATIENT)
Dept: ULTRASOUND IMAGING | Facility: CLINIC | Age: 50
End: 2025-03-22
Attending: EMERGENCY MEDICINE
Payer: COMMERCIAL

## 2025-03-22 ENCOUNTER — HOSPITAL ENCOUNTER (INPATIENT)
Facility: CLINIC | Age: 50
LOS: 3 days | Discharge: HOME | End: 2025-03-25
Attending: EMERGENCY MEDICINE | Admitting: STUDENT IN AN ORGANIZED HEALTH CARE EDUCATION/TRAINING PROGRAM
Payer: COMMERCIAL

## 2025-03-22 ENCOUNTER — APPOINTMENT (OUTPATIENT)
Dept: GENERAL RADIOLOGY | Facility: CLINIC | Age: 50
End: 2025-03-22
Attending: STUDENT IN AN ORGANIZED HEALTH CARE EDUCATION/TRAINING PROGRAM
Payer: COMMERCIAL

## 2025-03-22 ENCOUNTER — APPOINTMENT (OUTPATIENT)
Dept: CT IMAGING | Facility: CLINIC | Age: 50
End: 2025-03-22
Attending: EMERGENCY MEDICINE
Payer: COMMERCIAL

## 2025-03-22 DIAGNOSIS — A41.9 SEVERE SEPSIS (H): ICD-10-CM

## 2025-03-22 DIAGNOSIS — R65.20 SEVERE SEPSIS (H): ICD-10-CM

## 2025-03-22 DIAGNOSIS — E87.29 ALCOHOLIC KETOACIDOSIS: Primary | ICD-10-CM

## 2025-03-22 DIAGNOSIS — K70.31 ALCOHOLIC CIRRHOSIS OF LIVER WITH ASCITES (H): ICD-10-CM

## 2025-03-22 DIAGNOSIS — R10.84 GENERALIZED ABDOMINAL PAIN: ICD-10-CM

## 2025-03-22 DIAGNOSIS — R79.1 ELEVATED INR: ICD-10-CM

## 2025-03-22 PROBLEM — F10.929 ACUTE ALCOHOLIC INTOXICATION: Status: ACTIVE | Noted: 2024-08-01

## 2025-03-22 PROBLEM — M79.604 BILATERAL LEG PAIN: Status: ACTIVE | Noted: 2023-09-27

## 2025-03-22 PROBLEM — E11.9 TYPE 2 DIABETES MELLITUS WITHOUT COMPLICATION, WITHOUT LONG-TERM CURRENT USE OF INSULIN (H): Status: ACTIVE | Noted: 2022-05-23

## 2025-03-22 PROBLEM — I10 PRIMARY HYPERTENSION: Status: ACTIVE | Noted: 2023-09-27

## 2025-03-22 PROBLEM — R74.8 ELEVATED LIVER ENZYMES: Status: ACTIVE | Noted: 2022-05-24

## 2025-03-22 PROBLEM — M79.605 BILATERAL LEG PAIN: Status: ACTIVE | Noted: 2023-09-27

## 2025-03-22 PROBLEM — R91.1 PULMONARY NODULE: Status: ACTIVE | Noted: 2023-12-26

## 2025-03-22 LAB
ALBUMIN BODY FLUID SOURCE: NORMAL
ALBUMIN FLD-MCNC: 2.3 G/DL
ALBUMIN SERPL BCG-MCNC: 4.3 G/DL (ref 3.5–5.2)
ALP SERPL-CCNC: 181 U/L (ref 40–150)
ALT SERPL W P-5'-P-CCNC: 28 U/L (ref 0–50)
AMMONIA PLAS-SCNC: 237 UMOL/L (ref 11–51)
ANION GAP SERPL CALCULATED.3IONS-SCNC: 49 MMOL/L (ref 7–15)
APPEARANCE FLD: ABNORMAL
AST SERPL W P-5'-P-CCNC: 104 U/L (ref 0–45)
BASE EXCESS BLDV CALC-SCNC: -25 MMOL/L (ref -3–3)
BASE EXCESS BLDV CALC-SCNC: -27 MMOL/L (ref -3–3)
BASE EXCESS BLDV CALC-SCNC: -27 MMOL/L (ref -3–3)
BASE EXCESS BLDV CALC-SCNC: -28.7 MMOL/L (ref -3–3)
BASOPHILS # BLD AUTO: 0.1 10E3/UL (ref 0–0.2)
BASOPHILS NFR BLD AUTO: 1 %
BILIRUB SERPL-MCNC: 4.2 MG/DL
BUN SERPL-MCNC: 13.8 MG/DL (ref 6–20)
CALCIUM SERPL-MCNC: 9.6 MG/DL (ref 8.8–10.4)
CHLORIDE SERPL-SCNC: 82 MMOL/L (ref 98–107)
COLOR FLD: YELLOW
CREAT SERPL-MCNC: 0.86 MG/DL (ref 0.51–0.95)
EGFRCR SERPLBLD CKD-EPI 2021: 83 ML/MIN/1.73M2
EOSINOPHIL # BLD AUTO: 0 10E3/UL (ref 0–0.7)
EOSINOPHIL NFR BLD AUTO: 0 %
ERYTHROCYTE [DISTWIDTH] IN BLOOD BY AUTOMATED COUNT: 18.5 % (ref 10–15)
EST. AVERAGE GLUCOSE BLD GHB EST-MCNC: 105 MG/DL
ETHANOL SERPL-MCNC: 0.07 G/DL
GLUCOSE BLDC GLUCOMTR-MCNC: 121 MG/DL (ref 70–99)
GLUCOSE BLDC GLUCOMTR-MCNC: 124 MG/DL (ref 70–99)
GLUCOSE BLDC GLUCOMTR-MCNC: 65 MG/DL (ref 70–99)
GLUCOSE BLDC GLUCOMTR-MCNC: 85 MG/DL (ref 70–99)
GLUCOSE SERPL-MCNC: 63 MG/DL (ref 70–99)
HBA1C MFR BLD: 5.3 %
HCO3 BLDV-SCNC: 4 MMOL/L (ref 21–28)
HCO3 BLDV-SCNC: 5 MMOL/L (ref 21–28)
HCO3 SERPL-SCNC: 3 MMOL/L (ref 22–29)
HCT VFR BLD AUTO: 33.3 % (ref 35–47)
HGB BLD-MCNC: 9.7 G/DL (ref 11.7–15.7)
HOLD SPECIMEN: NORMAL
IMM GRANULOCYTES # BLD: 0.2 10E3/UL
IMM GRANULOCYTES NFR BLD: 1 %
INR PPP: 2.28 (ref 0.85–1.15)
LACTATE BLD-SCNC: >19 MMOL/L (ref 0.7–2)
LACTATE SERPL-SCNC: 17 MMOL/L (ref 0.7–2)
LIPASE SERPL-CCNC: 31 U/L (ref 13–60)
LYMPHOCYTES # BLD AUTO: 2.8 10E3/UL (ref 0.8–5.3)
LYMPHOCYTES NFR BLD AUTO: 18 %
MAGNESIUM SERPL-MCNC: 2.2 MG/DL (ref 1.7–2.3)
MCH RBC QN AUTO: 29.6 PG (ref 26.5–33)
MCHC RBC AUTO-ENTMCNC: 29.1 G/DL (ref 31.5–36.5)
MCV RBC AUTO: 102 FL (ref 78–100)
MONOCYTES # BLD AUTO: 0.8 10E3/UL (ref 0–1.3)
MONOCYTES NFR BLD AUTO: 5 %
NEUTROPHILS # BLD AUTO: 11.3 10E3/UL (ref 1.6–8.3)
NEUTROPHILS NFR BLD AUTO: 75 %
NRBC # BLD AUTO: 0.1 10E3/UL
NRBC BLD AUTO-RTO: 0 /100
O2/TOTAL GAS SETTING VFR VENT: 21 %
OXYHGB MFR BLDV: 63 % (ref 70–75)
PCO2 BLDV: 21 MM HG (ref 40–50)
PCO2 BLDV: 21 MM HG (ref 40–50)
PCO2 BLDV: 23 MM HG (ref 40–50)
PCO2 BLDV: 24 MM HG (ref 40–50)
PH BLDV: 6.83 [PH] (ref 7.32–7.43)
PH BLDV: 6.9 [PH] (ref 7.32–7.43)
PH BLDV: 6.91 [PH] (ref 7.32–7.43)
PH BLDV: 6.97 [PH] (ref 7.32–7.43)
PHOSPHATE SERPL-MCNC: 7 MG/DL (ref 2.5–4.5)
PLATELET # BLD AUTO: 203 10E3/UL (ref 150–450)
PO2 BLDV: 46 MM HG (ref 25–47)
PO2 BLDV: 54 MM HG (ref 25–47)
PO2 BLDV: 56 MM HG (ref 25–47)
PO2 BLDV: 58 MM HG (ref 25–47)
POTASSIUM SERPL-SCNC: 3.1 MMOL/L (ref 3.4–5.3)
PROT FLD-MCNC: 4.8 G/DL
PROT SERPL-MCNC: 10.3 G/DL (ref 6.4–8.3)
PROTEIN BODY FLUID SOURCE: NORMAL
RBC # BLD AUTO: 3.28 10E6/UL (ref 3.8–5.2)
SAO2 % BLDV: 58 % (ref 70–75)
SAO2 % BLDV: 64 % (ref 70–75)
SAO2 % BLDV: 64.8 % (ref 70–75)
SAO2 % BLDV: 66 % (ref 70–75)
SODIUM SERPL-SCNC: 134 MMOL/L (ref 135–145)
SPECIMEN SOURCE FLD: ABNORMAL
WBC # BLD AUTO: 15.1 10E3/UL (ref 4–11)
WBC # FLD AUTO: 690 /UL

## 2025-03-22 PROCEDURE — 250N000011 HC RX IP 250 OP 636: Performed by: EMERGENCY MEDICINE

## 2025-03-22 PROCEDURE — 96375 TX/PRO/DX INJ NEW DRUG ADDON: CPT

## 2025-03-22 PROCEDURE — 99285 EMERGENCY DEPT VISIT HI MDM: CPT | Mod: 25

## 2025-03-22 PROCEDURE — 84100 ASSAY OF PHOSPHORUS: CPT | Performed by: EMERGENCY MEDICINE

## 2025-03-22 PROCEDURE — 85025 COMPLETE CBC W/AUTO DIFF WBC: CPT | Performed by: EMERGENCY MEDICINE

## 2025-03-22 PROCEDURE — 36569 INSJ PICC 5 YR+ W/O IMAGING: CPT

## 2025-03-22 PROCEDURE — 82805 BLOOD GASES W/O2 SATURATION: CPT | Performed by: EMERGENCY MEDICINE

## 2025-03-22 PROCEDURE — 96376 TX/PRO/DX INJ SAME DRUG ADON: CPT

## 2025-03-22 PROCEDURE — 74177 CT ABD & PELVIS W/CONTRAST: CPT

## 2025-03-22 PROCEDURE — 87070 CULTURE OTHR SPECIMN AEROBIC: CPT | Performed by: EMERGENCY MEDICINE

## 2025-03-22 PROCEDURE — 82962 GLUCOSE BLOOD TEST: CPT

## 2025-03-22 PROCEDURE — 83605 ASSAY OF LACTIC ACID: CPT | Performed by: STUDENT IN AN ORGANIZED HEALTH CARE EDUCATION/TRAINING PROGRAM

## 2025-03-22 PROCEDURE — 200N000001 HC R&B ICU

## 2025-03-22 PROCEDURE — 99223 1ST HOSP IP/OBS HIGH 75: CPT | Performed by: STUDENT IN AN ORGANIZED HEALTH CARE EDUCATION/TRAINING PROGRAM

## 2025-03-22 PROCEDURE — 83036 HEMOGLOBIN GLYCOSYLATED A1C: CPT | Performed by: STUDENT IN AN ORGANIZED HEALTH CARE EDUCATION/TRAINING PROGRAM

## 2025-03-22 PROCEDURE — 250N000011 HC RX IP 250 OP 636: Performed by: STUDENT IN AN ORGANIZED HEALTH CARE EDUCATION/TRAINING PROGRAM

## 2025-03-22 PROCEDURE — 36415 COLL VENOUS BLD VENIPUNCTURE: CPT | Performed by: EMERGENCY MEDICINE

## 2025-03-22 PROCEDURE — 258N000003 HC RX IP 258 OP 636: Performed by: EMERGENCY MEDICINE

## 2025-03-22 PROCEDURE — 250N000013 HC RX MED GY IP 250 OP 250 PS 637: Performed by: STUDENT IN AN ORGANIZED HEALTH CARE EDUCATION/TRAINING PROGRAM

## 2025-03-22 PROCEDURE — 250N000009 HC RX 250: Performed by: STUDENT IN AN ORGANIZED HEALTH CARE EDUCATION/TRAINING PROGRAM

## 2025-03-22 PROCEDURE — 82140 ASSAY OF AMMONIA: CPT | Performed by: EMERGENCY MEDICINE

## 2025-03-22 PROCEDURE — 83735 ASSAY OF MAGNESIUM: CPT | Performed by: EMERGENCY MEDICINE

## 2025-03-22 PROCEDURE — P9047 ALBUMIN (HUMAN), 25%, 50ML: HCPCS | Performed by: STUDENT IN AN ORGANIZED HEALTH CARE EDUCATION/TRAINING PROGRAM

## 2025-03-22 PROCEDURE — 250N000009 HC RX 250: Performed by: RADIOLOGY

## 2025-03-22 PROCEDURE — 999N000065 XR CHEST PORT 1 VIEW

## 2025-03-22 PROCEDURE — 250N000009 HC RX 250: Performed by: EMERGENCY MEDICINE

## 2025-03-22 PROCEDURE — 272N000452 HC KIT SHRLOCK 5FR POWER PICC TRIPLE LUMEN

## 2025-03-22 PROCEDURE — 93005 ELECTROCARDIOGRAM TRACING: CPT

## 2025-03-22 PROCEDURE — 258N000001 HC RX 258: Performed by: EMERGENCY MEDICINE

## 2025-03-22 PROCEDURE — 272N000706 US PARACENTESIS WITHOUT ALBUMIN

## 2025-03-22 PROCEDURE — 96365 THER/PROPH/DIAG IV INF INIT: CPT | Mod: 59

## 2025-03-22 PROCEDURE — 87205 SMEAR GRAM STAIN: CPT | Performed by: EMERGENCY MEDICINE

## 2025-03-22 PROCEDURE — 80053 COMPREHEN METABOLIC PANEL: CPT | Performed by: EMERGENCY MEDICINE

## 2025-03-22 PROCEDURE — 84157 ASSAY OF PROTEIN OTHER: CPT | Performed by: EMERGENCY MEDICINE

## 2025-03-22 PROCEDURE — 83690 ASSAY OF LIPASE: CPT | Performed by: EMERGENCY MEDICINE

## 2025-03-22 PROCEDURE — 0W9G3ZZ DRAINAGE OF PERITONEAL CAVITY, PERCUTANEOUS APPROACH: ICD-10-PCS | Performed by: RADIOLOGY

## 2025-03-22 PROCEDURE — 49083 ABD PARACENTESIS W/IMAGING: CPT

## 2025-03-22 PROCEDURE — 96361 HYDRATE IV INFUSION ADD-ON: CPT

## 2025-03-22 PROCEDURE — 250N000013 HC RX MED GY IP 250 OP 250 PS 637: Performed by: EMERGENCY MEDICINE

## 2025-03-22 PROCEDURE — 82042 OTHER SOURCE ALBUMIN QUAN EA: CPT | Performed by: EMERGENCY MEDICINE

## 2025-03-22 PROCEDURE — 258N000003 HC RX IP 258 OP 636: Performed by: STUDENT IN AN ORGANIZED HEALTH CARE EDUCATION/TRAINING PROGRAM

## 2025-03-22 PROCEDURE — 82803 BLOOD GASES ANY COMBINATION: CPT

## 2025-03-22 PROCEDURE — 82077 ASSAY SPEC XCP UR&BREATH IA: CPT | Performed by: EMERGENCY MEDICINE

## 2025-03-22 PROCEDURE — 85610 PROTHROMBIN TIME: CPT | Performed by: EMERGENCY MEDICINE

## 2025-03-22 PROCEDURE — 89050 BODY FLUID CELL COUNT: CPT | Performed by: EMERGENCY MEDICINE

## 2025-03-22 RX ORDER — NALOXONE HYDROCHLORIDE 0.4 MG/ML
0.2 INJECTION, SOLUTION INTRAMUSCULAR; INTRAVENOUS; SUBCUTANEOUS
Status: DISCONTINUED | OUTPATIENT
Start: 2025-03-22 | End: 2025-03-25 | Stop reason: HOSPADM

## 2025-03-22 RX ORDER — NALTREXONE HYDROCHLORIDE 50 MG/1
50 TABLET, FILM COATED ORAL DAILY
COMMUNITY

## 2025-03-22 RX ORDER — GABAPENTIN 100 MG/1
100 CAPSULE ORAL 3 TIMES DAILY
COMMUNITY

## 2025-03-22 RX ORDER — GABAPENTIN 100 MG/1
100 CAPSULE ORAL 3 TIMES DAILY
Status: DISCONTINUED | OUTPATIENT
Start: 2025-03-22 | End: 2025-03-25 | Stop reason: HOSPADM

## 2025-03-22 RX ORDER — IOPAMIDOL 755 MG/ML
91 INJECTION, SOLUTION INTRAVASCULAR ONCE
Status: COMPLETED | OUTPATIENT
Start: 2025-03-22 | End: 2025-03-22

## 2025-03-22 RX ORDER — OXYCODONE HYDROCHLORIDE 5 MG/1
10 TABLET ORAL EVERY 4 HOURS PRN
Status: DISCONTINUED | OUTPATIENT
Start: 2025-03-22 | End: 2025-03-25 | Stop reason: HOSPADM

## 2025-03-22 RX ORDER — DEXTROSE MONOHYDRATE 25 G/50ML
25-50 INJECTION, SOLUTION INTRAVENOUS
Status: DISCONTINUED | OUTPATIENT
Start: 2025-03-22 | End: 2025-03-25 | Stop reason: HOSPADM

## 2025-03-22 RX ORDER — DEXTROSE MONOHYDRATE 25 G/50ML
25 INJECTION, SOLUTION INTRAVENOUS ONCE
Status: COMPLETED | OUTPATIENT
Start: 2025-03-22 | End: 2025-03-22

## 2025-03-22 RX ORDER — OXYCODONE HYDROCHLORIDE 5 MG/1
5 TABLET ORAL EVERY 4 HOURS PRN
Status: DISCONTINUED | OUTPATIENT
Start: 2025-03-22 | End: 2025-03-25 | Stop reason: HOSPADM

## 2025-03-22 RX ORDER — FOLIC ACID 5 MG/ML
1 INJECTION, SOLUTION INTRAMUSCULAR; INTRAVENOUS; SUBCUTANEOUS ONCE
Status: COMPLETED | OUTPATIENT
Start: 2025-03-22 | End: 2025-03-22

## 2025-03-22 RX ORDER — LIDOCAINE HYDROCHLORIDE 10 MG/ML
10 INJECTION, SOLUTION EPIDURAL; INFILTRATION; INTRACAUDAL; PERINEURAL ONCE
Status: COMPLETED | OUTPATIENT
Start: 2025-03-22 | End: 2025-03-22

## 2025-03-22 RX ORDER — ONDANSETRON 2 MG/ML
4 INJECTION INTRAMUSCULAR; INTRAVENOUS ONCE
Status: DISCONTINUED | OUTPATIENT
Start: 2025-03-22 | End: 2025-03-22

## 2025-03-22 RX ORDER — LACTULOSE 10 G/15ML
20 SOLUTION ORAL ONCE
Status: COMPLETED | OUTPATIENT
Start: 2025-03-22 | End: 2025-03-22

## 2025-03-22 RX ORDER — THERA TABS 400 MCG
1 TAB ORAL ONCE
Status: COMPLETED | OUTPATIENT
Start: 2025-03-22 | End: 2025-03-22

## 2025-03-22 RX ORDER — LIDOCAINE 40 MG/G
CREAM TOPICAL
Status: DISCONTINUED | OUTPATIENT
Start: 2025-03-22 | End: 2025-03-25 | Stop reason: HOSPADM

## 2025-03-22 RX ORDER — MULTIPLE VITAMINS W/ MINERALS TAB 9MG-400MCG
1 TAB ORAL ONCE
Status: DISCONTINUED | OUTPATIENT
Start: 2025-03-22 | End: 2025-03-22

## 2025-03-22 RX ORDER — CEFTRIAXONE 2 G/1
2 INJECTION, POWDER, FOR SOLUTION INTRAMUSCULAR; INTRAVENOUS EVERY 24 HOURS
Status: DISCONTINUED | OUTPATIENT
Start: 2025-03-22 | End: 2025-03-23

## 2025-03-22 RX ORDER — PIPERACILLIN SODIUM, TAZOBACTAM SODIUM 4; .5 G/20ML; G/20ML
4.5 INJECTION, POWDER, LYOPHILIZED, FOR SOLUTION INTRAVENOUS EVERY 6 HOURS
Status: DISCONTINUED | OUTPATIENT
Start: 2025-03-22 | End: 2025-03-22

## 2025-03-22 RX ORDER — THIAMINE HYDROCHLORIDE 100 MG/ML
100 INJECTION, SOLUTION INTRAMUSCULAR; INTRAVENOUS ONCE
Status: COMPLETED | OUTPATIENT
Start: 2025-03-22 | End: 2025-03-22

## 2025-03-22 RX ORDER — HYDROMORPHONE HYDROCHLORIDE 1 MG/ML
0.5 INJECTION, SOLUTION INTRAMUSCULAR; INTRAVENOUS; SUBCUTANEOUS
Status: DISCONTINUED | OUTPATIENT
Start: 2025-03-22 | End: 2025-03-22

## 2025-03-22 RX ORDER — NALOXONE HYDROCHLORIDE 0.4 MG/ML
0.4 INJECTION, SOLUTION INTRAMUSCULAR; INTRAVENOUS; SUBCUTANEOUS
Status: DISCONTINUED | OUTPATIENT
Start: 2025-03-22 | End: 2025-03-25 | Stop reason: HOSPADM

## 2025-03-22 RX ORDER — LACTULOSE 10 G/15ML
20 SOLUTION ORAL 3 TIMES DAILY
Status: DISCONTINUED | OUTPATIENT
Start: 2025-03-22 | End: 2025-03-23

## 2025-03-22 RX ORDER — INDOMETHACIN 25 MG/1
100 CAPSULE ORAL ONCE
Status: COMPLETED | OUTPATIENT
Start: 2025-03-22 | End: 2025-03-22

## 2025-03-22 RX ORDER — ALBUMIN (HUMAN) 12.5 G/50ML
25 SOLUTION INTRAVENOUS ONCE
Status: COMPLETED | OUTPATIENT
Start: 2025-03-22 | End: 2025-03-22

## 2025-03-22 RX ORDER — NICOTINE POLACRILEX 4 MG
15-30 LOZENGE BUCCAL
Status: DISCONTINUED | OUTPATIENT
Start: 2025-03-22 | End: 2025-03-25 | Stop reason: HOSPADM

## 2025-03-22 RX ADMIN — DEXTROSE MONOHYDRATE 25 ML: 25 INJECTION, SOLUTION INTRAVENOUS at 13:13

## 2025-03-22 RX ADMIN — LIDOCAINE HYDROCHLORIDE ANHYDROUS 10 ML: 10 INJECTION, SOLUTION INFILTRATION at 16:31

## 2025-03-22 RX ADMIN — ALBUMIN HUMAN 25 G: 0.25 SOLUTION INTRAVENOUS at 18:56

## 2025-03-22 RX ADMIN — LACTULOSE 20 G: 20 SOLUTION ORAL at 21:46

## 2025-03-22 RX ADMIN — GABAPENTIN 100 MG: 100 CAPSULE ORAL at 21:46

## 2025-03-22 RX ADMIN — IOPAMIDOL 91 ML: 755 INJECTION, SOLUTION INTRAVENOUS at 15:02

## 2025-03-22 RX ADMIN — SODIUM BICARBONATE: 84 INJECTION, SOLUTION INTRAVENOUS at 14:28

## 2025-03-22 RX ADMIN — OXYCODONE HYDROCHLORIDE 10 MG: 5 TABLET ORAL at 22:53

## 2025-03-22 RX ADMIN — LACTULOSE 20 G: 20 SOLUTION ORAL at 17:07

## 2025-03-22 RX ADMIN — LIDOCAINE HYDROCHLORIDE 2 ML: 10 INJECTION, SOLUTION EPIDURAL; INFILTRATION; INTRACAUDAL; PERINEURAL at 19:45

## 2025-03-22 RX ADMIN — SODIUM BICARBONATE: 84 INJECTION, SOLUTION INTRAVENOUS at 22:53

## 2025-03-22 RX ADMIN — SODIUM CHLORIDE 1000 ML: 0.9 INJECTION, SOLUTION INTRAVENOUS at 13:35

## 2025-03-22 RX ADMIN — OXYCODONE HYDROCHLORIDE 10 MG: 5 TABLET ORAL at 18:56

## 2025-03-22 RX ADMIN — THERA TABS 1 TABLET: TAB at 15:48

## 2025-03-22 RX ADMIN — HYDROMORPHONE HYDROCHLORIDE 0.5 MG: 1 INJECTION, SOLUTION INTRAMUSCULAR; INTRAVENOUS; SUBCUTANEOUS at 15:36

## 2025-03-22 RX ADMIN — FOLIC ACID 1 MG: 5 INJECTION, SOLUTION INTRAMUSCULAR; INTRAVENOUS; SUBCUTANEOUS at 17:08

## 2025-03-22 RX ADMIN — THIAMINE HYDROCHLORIDE 100 MG: 100 INJECTION, SOLUTION INTRAMUSCULAR; INTRAVENOUS at 15:48

## 2025-03-22 RX ADMIN — SODIUM BICARBONATE 100 MEQ: 84 INJECTION INTRAVENOUS at 13:47

## 2025-03-22 RX ADMIN — HYDROMORPHONE HYDROCHLORIDE 0.5 MG: 1 INJECTION, SOLUTION INTRAMUSCULAR; INTRAVENOUS; SUBCUTANEOUS at 13:39

## 2025-03-22 RX ADMIN — CEFTRIAXONE SODIUM 2 G: 2 INJECTION, POWDER, FOR SOLUTION INTRAMUSCULAR; INTRAVENOUS at 21:34

## 2025-03-22 RX ADMIN — SODIUM CHLORIDE 66 ML: 9 INJECTION, SOLUTION INTRAVENOUS at 15:02

## 2025-03-22 ASSESSMENT — ACTIVITIES OF DAILY LIVING (ADL)
ADLS_ACUITY_SCORE: 49
ADLS_ACUITY_SCORE: 55
ADLS_ACUITY_SCORE: 55
ADLS_ACUITY_SCORE: 49
ADLS_ACUITY_SCORE: 55
ADLS_ACUITY_SCORE: 49
ADLS_ACUITY_SCORE: 55
ADLS_ACUITY_SCORE: 49
ADLS_ACUITY_SCORE: 49

## 2025-03-22 NOTE — ED TRIAGE NOTES
Pt BIB EMS, from home,with increasing dizziness, nausea/vomiting and abdominal pain over the last couple days.    Additionally, pt with ETOH use of 4 drinks/day, couple liters per week. Has prescribed naltrexone.     Hx Diet-controlled diabetic (BS 63), chronic back pain, and fatty liver.    EMS administered zofran ODT.

## 2025-03-22 NOTE — H&P
[facility]    History and Physical - Hospitalist Service       Date of Admission:  3/22/2025    Assessment & Plan      Pauline Merino is a 49 year old female admitted on 3/22/2025. She presents with increasing dizziness, nausea/vomiting and abdominal pain over the last couple days.       Alcoholic ketoacidosis    Lactic Acidosis    Hypokalemia    SBP    Generalized abdominal pain    Elevated liver enzymes    Alcoholic cirrhosis of liver with ascites (H)    Elevated INR    Hepatic encephalopathy    Assessment: Presents with 2-day history of worsening abdominal pain with nausea and vomiting in addition to mild confusion.  CT abdomen shows   Cirrhosis with recanalized umbilical vein and moderate ascites similar to previous.  No significant new findings.  On admission labs are pertinent for a lactic acid greater than 19, mildly elevated LFTs along with a ammonia of 237.  Mild hyponatremia with hypokalemia of 3.1.  Paracentesis was performed.  She is otherwise afebrile at this time and hypotensive.    Plan:   -Admit to ICU  -IV ceftriaxone 2 g daily  -IV fluids overnight  -Albumin 25g ordered post paracentesis  -Follow vitals/temp  -Lactulose TID  -Pain control as needed  -Follow LFTs  -Low threshold to consult critical care service for persistent hypotension if not responding to fluids  -Monitor for alcohol withdrawal symptoms      Type 2 diabetes mellitus without complication, without long-term current use of insulin (H)    Assessment: diet controlled    Plan:   - Check A1c  - sliding scale insulin as needed  - Hypoglycemia protocol      Primary hypertension    Assessment/Plan: Hold prior to admission Lasix/Aldactone        Diet:  Regular Diet  DVT Prophylaxis: Pneumatic Compression Devices  Flores Catheter: Not present  Lines: None     Cardiac Monitoring: None  Code Status:  FULL CODE    Clinically Significant Risk Factors Present on Admission        # Hypokalemia: Lowest K = 3.1  mmol/L in last 2 days, will replace as needed  # Hyponatremia: Lowest Na = 134 mmol/L in last 2 days, will monitor as appropriate  # Hypochloremia: Lowest Cl = 82 mmol/L in last 2 days, will monitor as appropriate     # Anion Gap Metabolic Acidosis: Highest Anion Gap = 49 mmol/L in last 2 days, will monitor and treat as appropriate   # Coagulation Defect: INR = 2.28 (Ref range: 0.85 - 1.15) and/or PTT = N/A, will monitor for bleeding    # Hypertension: Noted on problem list      # Anemia: based on hgb <11                  Disposition Plan     Medically Ready for Discharge: Anticipated in 2-4 Days           Law Moore MD  Hospitalist Service  [facility]  Securely message with [employer] (more info)  Text page via [employer] Paging/Directory     ______________________________________________________________________    Chief Complaint     Dizziness, nausea/vomiting and abdominal pain    History is obtained from the patient    History of Present Illness     Pauline Merino is a 49 year old female with past medical history of hypertension, alcohol use disorder, type 2 diabetes mellitus who presents for evaluation of abdominal pain, nausea and vomiting, for the past 2 days.    Patient reports that for the past 2 days she has been dealing with ongoing diffuse abdominal pain in addition to nausea and vomiting along with back pain.  Has no significant diarrhea or bloody stools.  She consumes alcohol daily, with last drink being yesterday.  She underwent paracentesis a few weeks ago without occurred without any complications.  Denies any fevers or chills.  She has no urinary complaints of urgency or frequency, no dysuria or hematuria.  She denies any bloody stools.  She has no headaches, slurred speech, localized weakness or numbness of her extremities.  She otherwise has no other complaints this time      Past Medical History    Past Medical History:   Diagnosis Date    BMI 31.0-31.9,adult      Dermatitis        Past Surgical History   Past Surgical History:   Procedure Laterality Date    HC TOOTH EXTRACTION W/FORCEP         Prior to Admission Medications   Prior to Admission Medications   Prescriptions Last Dose Informant Patient Reported? Taking?   folic acid (FOLVITE) 1 MG tablet   No Yes   Sig: Take 1 tablet (1 mg) by mouth daily.   furosemide (LASIX) 20 MG tablet   No Yes   Sig: Take 1 tablet (20 mg) by mouth daily.   Patient taking differently: Take 20 mg by mouth 2 times daily.   gabapentin (NEURONTIN) 100 MG capsule   Yes Yes   Sig: Take 100 mg by mouth 3 times daily.   multivitamin w/minerals (THERA-VIT-M) tablet   No Yes   Sig: Take 1 tablet by mouth daily.   naltrexone (DEPADE/REVIA) 50 MG tablet   Yes Yes   Sig: Take 50 mg by mouth daily.   spironolactone (ALDACTONE) 50 MG tablet   No Yes   Sig: Take 1 tablet (50 mg) by mouth daily.      Facility-Administered Medications: None        Review of Systems    The 10 point Review of Systems is negative other than noted in the HPI or here.     Social History   I have reviewed this patient's social history and updated it with pertinent information if needed.  Social History     Tobacco Use    Smoking status: Every Day     Current packs/day: 0.25     Types: Cigarettes    Smokeless tobacco: Current   Substance Use Topics    Alcohol use: Yes         Family History   I have reviewed this patient's family history and updated it with pertinent information if needed.  Family History   Adopted: Yes   Problem Relation Age of Onset    Medical History Unknown Mother          Allergies   No Known Allergies  ------------------------------------------------------------------------     Physical Exam   Vital Signs: Temp: 97  F (36.1  C) Temp src: Temporal BP: 89/47 Pulse: 109   Resp: 22 SpO2: 99 % O2 Device: None (Room air)    Weight: 0 lbs 0 oz    Constitutional: Ill-appearing but otherwise awake, alert, cooperative, no apparent distress.   Eyes: Lids and lashes  normal, pupils equal, round and reactive to light   ENT: Normocephalic, without obvious abnormality, atraumatic, sinuses nontender on palpation   Hematologic / Lymphatic: no cervical lymphadenopathy   Respiratory: CTABL   Cardiovascular: Tachycardic and regular with no m/r/g   GI: Normal bowel sounds, soft, distended, with mild tenderness diffusely.  There is no rebound tenderness or guarding.  Skin: normal skin color, texture, turgor   Musculoskeletal: There is no redness, warmth, or swelling of the joints. Full range of motion noted.   Neurologic: Awake, alert, oriented to name, place and time. Ghassan. Motor is 5 out of 5 bilaterally. Sensory is intact.   Neuropsychiatric: normal mood and affect    ----------------------------------------------------------------------------------------------------------------------------------    Medical Decision Making       75 MINUTES SPENT BY ME on the date of service doing chart review, history, exam, documentation & further activities per the note.      Data   ------------------------- PAST 24 HR DATA REVIEWED -----------------------------------------------    I have personally reviewed the following data over the past 24 hrs:    15.1 (H)  \   9.7 (L)   / 203     134 (L) 82 (L) 13.8 /  121 (H)   3.1 (L) 3 (LL) 0.86 \     ALT: 28 AST: 104 (H) AP: 181 (H) TBILI: 4.2 (H)   ALB: 4.3 TOT PROTEIN: 10.3 (H) LIPASE: 31     Procal: N/A CRP: N/A Lactic Acid: >19.0 (HH)       INR:  2.28 (H) PTT:  N/A   D-dimer:  N/A Fibrinogen:  N/A       Imaging results reviewed over the past 24 hrs:   Recent Results (from the past 24 hours)   CT Abdomen Pelvis w Contrast    Narrative    EXAM: CT ABDOMEN PELVIS W CONTRAST  LOCATION: [facility]  DATE: 3/22/2025    INDICATION: Distention, vomiting, h o ETOH  COMPARISON: 11/8/2024 CT  TECHNIQUE: CT scan of the abdomen and pelvis was performed following injection of IV contrast. Multiplanar reformats were obtained. Dose reduction  techniques were used.  CONTRAST: 91mL Isovue 370    FINDINGS:   LOWER CHEST: Normal.    HEPATOBILIARY: Cirrhosis with changes of portal venous hypertension with recanalized umbilical vein and multiple collateral vessels and moderate ascites. No liver masses.    PANCREAS: Normal.    SPLEEN: 12.0 cm    ADRENAL GLANDS: Normal.    KIDNEYS/BLADDER: Tiny 2 mm nonobstructing stone right kidney unchanged.    BOWEL: Normal.    LYMPH NODES: Normal.    VASCULATURE: Normal.    PELVIC ORGANS: Tiny uterine fibroid.    MUSCULOSKELETAL: No concerning bone lesion.      Impression    IMPRESSION:   1.  Cirrhosis with recanalized umbilical vein and moderate ascites similar to previous.    2.  No significant new findings.     ------------------------- ENCOUNTER LABS ----------------------------------------------------------------  Recent Labs   Lab 03/22/25  1358 03/22/25  1318 03/22/25  1256   WBC  --  15.1*  --    HGB  --  9.7*  --    MCV  --  102*  --    PLT  --  203  --    INR  --  2.28*  --    NA  --  134*  --    POTASSIUM  --  3.1*  --    CHLORIDE  --  82*  --    CO2  --  3*  --    BUN  --  13.8  --    CR  --  0.86  --    ANIONGAP  --  49*  --    ALEX  --  9.6  --    * 63* 65*   ALBUMIN  --  4.3  --    PROTTOTAL  --  10.3*  --    BILITOTAL  --  4.2*  --    ALKPHOS  --  181*  --    ALT  --  28  --    AST  --  104*  --    LIPASE  --  31  --        Most Recent 3 CBC's:  Recent Labs   Lab Test 03/22/25  1318 11/10/24  1134 11/09/24  0736   WBC 15.1* 7.2 6.2   HGB 9.7* 9.0* 8.9*   * 98 97    120* 133*     Most Recent 3 BMP's:  Recent Labs   Lab Test 03/22/25  1358 03/22/25  1318 03/22/25  1256 11/10/24  1134 11/09/24  1211 11/09/24  0736   NA  --  134*  --  135  --  134*   POTASSIUM  --  3.1*  --  3.9  --  3.9   CHLORIDE  --  82*  --  103  --  99   CO2  --  3*  --  22  --  18*   BUN  --  13.8  --  4.4*  --  3.6*   CR  --  0.86  --  0.51  --  0.45*   ANIONGAP  --  49*  --  10  --  17*   ALEX  --  9.6  --  7.6*  --   7.6*   * 63* 65* 118*   < > 132*    < > = values in this interval not displayed.     Most Recent 2 LFT's:  Recent Labs   Lab Test 03/22/25  1318 11/10/24  1134   * 170*   ALT 28 23   ALKPHOS 181* 209*   BILITOTAL 4.2* 1.8*     Most Recent 3 INR's:  Recent Labs   Lab Test 03/22/25  1318 11/09/24  0736 11/08/24  1735   INR 2.28* 1.48* 1.43*     Most Recent 3 Troponin's:No lab results found.  Most Recent 3 BNP's:No lab results found.  Most Recent D-dimer:No lab results found.  Most Recent Cholesterol Panel:  Recent Labs   Lab Test 03/01/18  1124   CHOL 197   LDL 85   HDL 71   TRIG 207*     Most Recent 6 Bacteria Isolates From Any Culture (See EPIC Reports for Culture Details):No lab results found.  Most Recent TSH and T4:  Recent Labs   Lab Test 12/30/21  1030   T4 7.1     Most Recent Urinalysis:  Recent Labs   Lab Test 11/08/24  1810   COLOR Yellow   APPEARANCE Clear   URINEGLC Negative   URINEBILI Negative   URINEKETONE 60*   SG 1.017   UBLD Negative   URINEPH 6.0   PROTEIN 20*   NITRITE Negative   LEUKEST Negative   RBCU <1   WBCU 1     Most Recent ESR & CRP:No lab results found.

## 2025-03-22 NOTE — PHARMACY-ADMISSION MEDICATION HISTORY
Pharmacy Intern Admission Medication History    Admission medication history is complete. The information provided in this note is only as accurate as the sources available at the time of the update.    Information Source(s): Patient, Family member, and Prescription bottles via in-person    Pertinent Information: Patient was somewhat of a reliable historian,  was there with prescription bottles to help. Updated PTA list based on prescription bottles, patient was wheeled away at the end of the interview and  did not know last doses.    Changes made to PTA medication list:  Added: gabapentin, naltrexone  Deleted: oxycodone, thiamine, trazodone  Changed: furosemide: daily --> BID    Allergies reviewed with patient and updates made in EHR: yes    Medication History Completed By: JEANETTE RODNEY 3/22/2025 2:45 PM    PTA Med List   Medication Sig Last Dose/Taking    folic acid (FOLVITE) 1 MG tablet Take 1 tablet (1 mg) by mouth daily. Taking    furosemide (LASIX) 20 MG tablet Take 1 tablet (20 mg) by mouth daily. (Patient taking differently: Take 20 mg by mouth 2 times daily.) Taking Differently    gabapentin (NEURONTIN) 100 MG capsule Take 100 mg by mouth 3 times daily. Taking    multivitamin w/minerals (THERA-VIT-M) tablet Take 1 tablet by mouth daily. Taking    naltrexone (DEPADE/REVIA) 50 MG tablet Take 50 mg by mouth daily. Taking    spironolactone (ALDACTONE) 50 MG tablet Take 1 tablet (50 mg) by mouth daily. Taking

## 2025-03-22 NOTE — ED PROVIDER NOTES
Emergency Department Note      History of Present Illness     Chief Complaint   Abdominal Pain and Nausea & Vomiting      HPI   Pauline Merino is a 49 year old female with a history of alcohol use disorder, hypertension and type 2 diabetes mellitus who was brought in by EMS from home for evaluation of abdominal pain, back pain, nausea and vomiting symptoms of past 2 days. She denies any diarrhea. She has mild cough. No past history of abdominal Surgery.  She does continue to drink alcohol but denies any alcohol today.  She reports she underwent a paracentesis few weeks ago that was her first time.     Independent Historian   Her     Review of External Notes   I reviewed her admission on November 8 for ketosis secondary to alcohol use.    Past Medical History     Medical History and Problem List   Type 2 diabetes mellitus  Hypertension  Alcohol use disorder  Metabolic acidosis increased anion gap  Diabetic ketoacidosis  Pulmonary nodule  ANAID     Medications   Lantus  Glimepiride  Amlodipine  Metoprolol succinate ER  Celecoxib     Surgical History   [place] teeth extraction  Physical Exam     Patient Vitals for the past 24 hrs:   BP Temp Temp src Pulse Resp SpO2   03/22/25 1700 89/47 -- -- 109 22 99 %   03/22/25 1445 113/56 -- -- 105 23 100 %   03/22/25 1430 117/89 -- -- 105 11 98 %   03/22/25 1415 -- -- -- 104 22 100 %   03/22/25 1413 110/60 -- -- 105 23 99 %   03/22/25 1400 100/49 -- -- 108 18 100 %   03/22/25 1345 133/89 -- -- 103 14 99 %   03/22/25 1330 126/59 -- -- 102 14 100 %   03/22/25 1238 (!) 146/74 97  F (36.1  C) Temporal 105 20 99 %     Physical Exam  Constitutional: Vital signs reviewed as above  General: Alert, uncomfortable appearing   HEENT: Dry mucous membranes  Eyes: Conjunctiva normal.   Neck: Normal range of motion  Cardiovascular: Tachycardic.  Regular rhythm and normal heart sounds.  No MRG.  Pulmonary/Chest: Tachypneic. Patient has no wheezes. Patient has no rales.   Abdominal:  Distended, no appreciable fluid wave, abdomen is not firm.  Mild diffuse tenderness.  Musculoskeletal/Extremities: Full ROM.  Endo: No pitting edema  Neurological: Alert  Skin: Skin is warm and dry.   Psychiatric: Anxious  Diagnostics     Lab Results   Labs Ordered and Resulted from Time of ED Arrival to Time of ED Departure   GLUCOSE BY METER - Abnormal       Result Value    GLUCOSE BY METER POCT 65 (*)    INR - Abnormal    INR 2.28 (*)    COMPREHENSIVE METABOLIC PANEL - Abnormal    Sodium 134 (*)     Potassium 3.1 (*)     Carbon Dioxide (CO2) 3 (*)     Anion Gap 49 (*)     Urea Nitrogen 13.8      Creatinine 0.86      GFR Estimate 83      Calcium 9.6      Chloride 82 (*)     Glucose 63 (*)     Alkaline Phosphatase 181 (*)      (*)     ALT 28      Protein Total 10.3 (*)     Albumin 4.3      Bilirubin Total 4.2 (*)    AMMONIA - Abnormal    Ammonia 237 (*)    ETHYL ALCOHOL LEVEL - Abnormal    Alcohol ethyl 0.07 (*)    PHOSPHORUS - Abnormal    Phosphorus 7.0 (*)    CBC WITH PLATELETS AND DIFFERENTIAL - Abnormal    WBC Count 15.1 (*)     RBC Count 3.28 (*)     Hemoglobin 9.7 (*)     Hematocrit 33.3 (*)      (*)     MCH 29.6      MCHC 29.1 (*)     RDW 18.5 (*)     Platelet Count 203      % Neutrophils 75      % Lymphocytes 18      % Monocytes 5      % Eosinophils 0      % Basophils 1      % Immature Granulocytes 1      NRBCs per 100 WBC 0      Absolute Neutrophils 11.3 (*)     Absolute Lymphocytes 2.8      Absolute Monocytes 0.8      Absolute Eosinophils 0.0      Absolute Basophils 0.1      Absolute Immature Granulocytes 0.2      Absolute NRBCs 0.1     ISTAT GASES LACTATE VENOUS POCT - Abnormal    Lactic Acid POCT >19.0 (*)     Bicarbonate Venous POCT 4 (*)     O2 Sat, Venous POCT 64 (*)     pCO2 Venous POCT 21 (*)     pH Venous POCT 6.90 (*)     pO2 Venous POCT 54 (*)     Base Excess/Deficit (+/-) POCT -27.0 (*)    BLOOD GAS VENOUS - Abnormal    pH Venous 6.83 (*)     pCO2 Venous 24 (*)     pO2 Venous 58  (*)     Bicarbonate Venous 4 (*)     Base Excess/Deficit Venous -28.7 (*)     FIO2 21      Oxyhemoglobin Venous 63 (*)     O2 Sat, Venous 64.8 (*)    ISTAT GASES LACTATE VENOUS POCT - Abnormal    Lactic Acid POCT >19.0 (*)     Bicarbonate Venous POCT 4 (*)     O2 Sat, Venous POCT 66 (*)     pCO2 Venous POCT 21 (*)     pH Venous POCT 6.91 (*)     pO2 Venous POCT 56 (*)     Base Excess/Deficit (+/-) POCT -27.0 (*)    GLUCOSE BY METER - Abnormal    GLUCOSE BY METER POCT 121 (*)    ISTAT GASES LACTATE VENOUS POCT - Abnormal    Lactic Acid POCT >19.0 (*)     Bicarbonate Venous POCT 5 (*)     O2 Sat, Venous POCT 58 (*)     pCO2 Venous POCT 23 (*)     pH Venous POCT 6.97 (*)     pO2 Venous POCT 46      Base Excess/Deficit (+/-) POCT -25.0 (*)    LIPASE - Normal    Lipase 31     MAGNESIUM - Normal    Magnesium 2.2     ALBUMIN FLUID    Albumin Fluid Source Abdomen      Albumin fluid 2.3     PROTEIN FLUID    Protein Fluid Source Abdomen      Protein Total Fluid 4.8     ISTAT GASES ELECTROLYTES VENOUS POCT   CELL COUNT BODY FLUID   DIFERENTIAL BODY FLUID   LACTIC ACID WHOLE BLOOD   HEMOGLOBIN A1C   UA MACROSCOPIC WITH REFLEX TO MICRO AND CULTURE   AEROBIC BACTERIAL CULTURE ROUTINE    Gram Stain Result No organisms seen      Gram Stain Result       CELL COUNT WITH DIFFERENTIAL FLUID       Imaging   CT Abdomen Pelvis w Contrast   Final Result   IMPRESSION:    1.  Cirrhosis with recanalized umbilical vein and moderate ascites similar to previous.      2.  No significant new findings.      US Paracentesis without Albumin    (Results Pending)       EKG   ECG results from 03/22/25   EKG 12-lead, tracing only     Value    Systolic Blood Pressure     Diastolic Blood Pressure     Ventricular Rate 103    Atrial Rate 103    GA Interval 172    QRS Duration 82        QTc 655    P Axis 73    R AXIS 93    T Axis 69    Interpretation ECG      ** Critical Test Result: Long QTc  Sinus tachycardia  Rightward axis  Nonspecific ST  abnormality  Prolonged QT  When compared with ECG of 09-Nov-2024 03:11,  QT has lengthened  Interepreted by me at 1400        Independent Interpretation   None    ED Course      Medications Administered   Medications   HYDROmorphone (PF) (DILAUDID) injection 0.5 mg (0.5 mg Intravenous $Given 3/22/25 1536)   ondansetron (ZOFRAN) injection 4 mg (0 mg Intravenous Hold 3/22/25 1402)   sodium bicarbonate 150 mEq in D5W 1,000 mL infusion ( Intravenous ED/Periop/Clinic Infusing on Admission/transfer 3/22/25 1724)   piperacillin-tazobactam (ZOSYN) 3.375 g vial to attach to  mL bag (has no administration in time range)   dextrose 50 % injection 25 mL (25 mLs Intravenous $Given 3/22/25 1313)   sodium chloride 0.9% BOLUS 1,000 mL (0 mLs Intravenous Stopped 3/22/25 1511)   sodium bicarbonate 8.4 % injection 100 mEq (100 mEq Intravenous $Given 3/22/25 1347)   iopamidol (ISOVUE-370) solution 91 mL (91 mLs Intravenous $Given 3/22/25 1502)   Saline (66 mLs As instructed $Given 3/22/25 1502)   thiamine (B-1) injection 100 mg (100 mg Intravenous $Given 3/22/25 1548)   folic acid injection 1 mg (1 mg Intravenous $Given 3/22/25 1708)   lactulose (CHRONULAC) solution 20 g (20 g Oral $Given 3/22/25 1707)   multivitamin, therapeutic (THERA-VIT) tablet 1 tablet (1 tablet Oral $Given 3/22/25 1548)   lidocaine (PF) (XYLOCAINE) 1 % injection 10 mL (10 mLs Subcutaneous $Given by Other 3/22/25 1631)       Procedures   Procedures     Discussion of Management   Admitting Hospitalist, Dr. Puente  Interventional radiologist, Dr. Henry  ED Course   ED Course as of 03/22/25 1746   Sat Mar 22, 2025   1307 I obtained the history and examined the patient as above.    1310 Blood sugar was 65   1335 I rechecked and updated the patient.     1352 I rechecked and updated the patient.         Additional Documentation  None    Medical Decision Making / Diagnosis     CMS Diagnoses: Patient has a markedly elevated lactate of greater than 19.  This is  secondary to her alcoholic ketosis.  There are no signs of severe sepsis or septic shock.    MIPS       None    [facility]   Pauline Merino is a 49 year old female who presents emergency department very uncomfortable with abdominal distention and tenderness.  She is anxious and crying.  She does continue to drink alcohol but denies any today.  I-STAT was immediately obtained and showed a pH of 6.9.  Lactic acid greater than 19, bicarb of 4.  We actually repeated this to determine if it was accurate and the repeat was the same.  I did have them drawn his head on the lab and actually showed a pH of 6.83 with a bicarb less than 4.  She was given 2 A of bicarb and started on a bicarb drip and D5.  She was noted to have a lower glucose at 65 and she is diabetic.  She got an amp of D50 before the bicarb drip was started.  Her CBC shows a leukocytosis but again she has been vomiting.  Hemoglobin is 9.7 which is her baseline.  Platelet count was normal.  Her hepatic panel does show elevated alk phos at 181 AST at 104 and a bilirubin of 4.2.  INR is 2.28 which is again secondary to her alcohol use.  Goal level is 0.07 here today.  Lipase was normal.  Ammonia was 237 and we did give lactulose.  Given her alcohol history we will also give her thiamine, folate and multivitamin.  She received a bolus of fluids prior to the bicarb infusion.  CT scan was obtained and does show moderate amount of ascites.  Patient was moved to the stable room.  She has 2 IVs.  She is actually looking much better.  I last check her pH was up to 6.97 with a lactate remaining at greater than 19.  Will continue with the bicarb drip.  I have ordered an ultrasound guided paracentesis.  I spoke to the intensivist who agrees with an ICU bed under the care of the hospitalist.  I spoke with Dr. Elkins from the hospitalist service who agreed with admission.    Critical Care  The critical condition was:  Alcoholic ketoacidosis with a pH of 6.83    Critical  interventions performed or strongly considered:  Bicarbonate drip    Critical care time was 60 minutes exclusive of time spent on separately billable procedures.      Disposition   The patient was admitted to the hospital.     Diagnosis     ICD-10-CM    1. Alcoholic ketoacidosis  E87.29       2. Alcoholic cirrhosis of liver with ascites (H)  K70.31       3. Elevated INR  R79.1       4. Generalized abdominal pain  R10.84            Scribe Disclosure:  Frankie VALENCIA, am serving as a scribe at 1:09 PM on 3/22/2025 to document services personally performed by Cliff Blanca MD based on my observations and the provider's statements to me.        Cliff Blanca MD  03/22/25 1265

## 2025-03-22 NOTE — ED NOTES
Bed: ED06  Expected date:   Expected time:   Means of arrival:   Comments:  A 534 49 F dizzy back pain abd pain nausea pancreatitis concerns vss eta 1220   No

## 2025-03-23 PROBLEM — N17.9 ACUTE KIDNEY FAILURE, UNSPECIFIED: Status: ACTIVE | Noted: 2025-03-23

## 2025-03-23 LAB
ALBUMIN SERPL BCG-MCNC: 3.5 G/DL (ref 3.5–5.2)
ALP SERPL-CCNC: 101 U/L (ref 40–150)
ALT SERPL W P-5'-P-CCNC: 72 U/L (ref 0–50)
ANION GAP SERPL CALCULATED.3IONS-SCNC: 18 MMOL/L (ref 7–15)
ANION GAP SERPL CALCULATED.3IONS-SCNC: 31 MMOL/L (ref 7–15)
AST SERPL W P-5'-P-CCNC: 431 U/L (ref 0–45)
BASE EXCESS BLDV CALC-SCNC: -3.6 MMOL/L (ref -3–3)
BILIRUB SERPL-MCNC: 5.1 MG/DL
BUN SERPL-MCNC: 20.5 MG/DL (ref 6–20)
BUN SERPL-MCNC: 23.8 MG/DL (ref 6–20)
CALCIUM SERPL-MCNC: 8.3 MG/DL (ref 8.8–10.4)
CALCIUM SERPL-MCNC: 8.6 MG/DL (ref 8.8–10.4)
CHLORIDE SERPL-SCNC: 78 MMOL/L (ref 98–107)
CHLORIDE SERPL-SCNC: 84 MMOL/L (ref 98–107)
CREAT SERPL-MCNC: 1.31 MG/DL (ref 0.51–0.95)
CREAT SERPL-MCNC: 1.58 MG/DL (ref 0.51–0.95)
EGFRCR SERPLBLD CKD-EPI 2021: 40 ML/MIN/1.73M2
EGFRCR SERPLBLD CKD-EPI 2021: 50 ML/MIN/1.73M2
ERYTHROCYTE [DISTWIDTH] IN BLOOD BY AUTOMATED COUNT: 17.5 % (ref 10–15)
ERYTHROCYTE [DISTWIDTH] IN BLOOD BY AUTOMATED COUNT: 18 % (ref 10–15)
GLUCOSE BLDC GLUCOMTR-MCNC: 130 MG/DL (ref 70–99)
GLUCOSE BLDC GLUCOMTR-MCNC: 146 MG/DL (ref 70–99)
GLUCOSE BLDC GLUCOMTR-MCNC: 157 MG/DL (ref 70–99)
GLUCOSE SERPL-MCNC: 169 MG/DL (ref 70–99)
GLUCOSE SERPL-MCNC: 171 MG/DL (ref 70–99)
HCO3 BLDV-SCNC: 21 MMOL/L (ref 21–28)
HCO3 SERPL-SCNC: 19 MMOL/L (ref 22–29)
HCO3 SERPL-SCNC: 27 MMOL/L (ref 22–29)
HCT VFR BLD AUTO: 23.6 % (ref 35–47)
HCT VFR BLD AUTO: 23.9 % (ref 35–47)
HGB BLD-MCNC: 7.6 G/DL (ref 11.7–15.7)
HGB BLD-MCNC: 7.9 G/DL (ref 11.7–15.7)
INR PPP: 2.32 (ref 0.85–1.15)
LACTATE SERPL-SCNC: 10.9 MMOL/L (ref 0.7–2)
LACTATE SERPL-SCNC: 2.6 MMOL/L (ref 0.7–2)
LACTATE SERPL-SCNC: 4.5 MMOL/L (ref 0.7–2)
MAGNESIUM SERPL-MCNC: 1.2 MG/DL (ref 1.7–2.3)
MAGNESIUM SERPL-MCNC: 2.3 MG/DL (ref 1.7–2.3)
MCH RBC QN AUTO: 29.6 PG (ref 26.5–33)
MCH RBC QN AUTO: 29.6 PG (ref 26.5–33)
MCHC RBC AUTO-ENTMCNC: 32.2 G/DL (ref 31.5–36.5)
MCHC RBC AUTO-ENTMCNC: 33.1 G/DL (ref 31.5–36.5)
MCV RBC AUTO: 90 FL (ref 78–100)
MCV RBC AUTO: 92 FL (ref 78–100)
MRSA DNA SPEC QL NAA+PROBE: POSITIVE
O2/TOTAL GAS SETTING VFR VENT: 21 %
OXYHGB MFR BLDV: 70 % (ref 70–75)
PCO2 BLDV: 33 MM HG (ref 40–50)
PH BLDV: 7.41 [PH] (ref 7.32–7.43)
PHOSPHATE SERPL-MCNC: 1.9 MG/DL (ref 2.5–4.5)
PHOSPHATE SERPL-MCNC: 2.3 MG/DL (ref 2.5–4.5)
PLATELET # BLD AUTO: 59 10E3/UL (ref 150–450)
PLATELET # BLD AUTO: 61 10E3/UL (ref 150–450)
PO2 BLDV: 38 MM HG (ref 25–47)
POTASSIUM SERPL-SCNC: 3.1 MMOL/L (ref 3.4–5.3)
POTASSIUM SERPL-SCNC: 3.6 MMOL/L (ref 3.4–5.3)
PROT SERPL-MCNC: 7.9 G/DL (ref 6.4–8.3)
RBC # BLD AUTO: 2.57 10E6/UL (ref 3.8–5.2)
RBC # BLD AUTO: 2.67 10E6/UL (ref 3.8–5.2)
SA TARGET DNA: POSITIVE
SAO2 % BLDV: 73 % (ref 70–75)
SODIUM SERPL-SCNC: 128 MMOL/L (ref 135–145)
SODIUM SERPL-SCNC: 129 MMOL/L (ref 135–145)
WBC # BLD AUTO: 5.9 10E3/UL (ref 4–11)
WBC # BLD AUTO: 6.8 10E3/UL (ref 4–11)

## 2025-03-23 PROCEDURE — 250N000009 HC RX 250: Performed by: HOSPITALIST

## 2025-03-23 PROCEDURE — 36415 COLL VENOUS BLD VENIPUNCTURE: CPT | Performed by: STUDENT IN AN ORGANIZED HEALTH CARE EDUCATION/TRAINING PROGRAM

## 2025-03-23 PROCEDURE — 250N000009 HC RX 250: Performed by: STUDENT IN AN ORGANIZED HEALTH CARE EDUCATION/TRAINING PROGRAM

## 2025-03-23 PROCEDURE — P9047 ALBUMIN (HUMAN), 25%, 50ML: HCPCS | Performed by: INTERNAL MEDICINE

## 2025-03-23 PROCEDURE — 250N000011 HC RX IP 250 OP 636: Performed by: INTERNAL MEDICINE

## 2025-03-23 PROCEDURE — 36415 COLL VENOUS BLD VENIPUNCTURE: CPT | Performed by: INTERNAL MEDICINE

## 2025-03-23 PROCEDURE — 82805 BLOOD GASES W/O2 SATURATION: CPT | Performed by: STUDENT IN AN ORGANIZED HEALTH CARE EDUCATION/TRAINING PROGRAM

## 2025-03-23 PROCEDURE — 83605 ASSAY OF LACTIC ACID: CPT | Performed by: STUDENT IN AN ORGANIZED HEALTH CARE EDUCATION/TRAINING PROGRAM

## 2025-03-23 PROCEDURE — 258N000003 HC RX IP 258 OP 636: Performed by: INTERNAL MEDICINE

## 2025-03-23 PROCEDURE — 99207 PR NO BILLABLE SERVICE THIS VISIT: CPT | Performed by: INTERNAL MEDICINE

## 2025-03-23 PROCEDURE — 258N000003 HC RX IP 258 OP 636: Performed by: HOSPITALIST

## 2025-03-23 PROCEDURE — 83735 ASSAY OF MAGNESIUM: CPT | Performed by: HOSPITALIST

## 2025-03-23 PROCEDURE — 250N000011 HC RX IP 250 OP 636: Performed by: HOSPITALIST

## 2025-03-23 PROCEDURE — 84100 ASSAY OF PHOSPHORUS: CPT | Performed by: STUDENT IN AN ORGANIZED HEALTH CARE EDUCATION/TRAINING PROGRAM

## 2025-03-23 PROCEDURE — 84100 ASSAY OF PHOSPHORUS: CPT | Performed by: HOSPITALIST

## 2025-03-23 PROCEDURE — 250N000013 HC RX MED GY IP 250 OP 250 PS 637: Performed by: STUDENT IN AN ORGANIZED HEALTH CARE EDUCATION/TRAINING PROGRAM

## 2025-03-23 PROCEDURE — 99291 CRITICAL CARE FIRST HOUR: CPT | Performed by: INTERNAL MEDICINE

## 2025-03-23 PROCEDURE — 250N000013 HC RX MED GY IP 250 OP 250 PS 637: Performed by: INTERNAL MEDICINE

## 2025-03-23 PROCEDURE — 80053 COMPREHEN METABOLIC PANEL: CPT | Performed by: STUDENT IN AN ORGANIZED HEALTH CARE EDUCATION/TRAINING PROGRAM

## 2025-03-23 PROCEDURE — 87040 BLOOD CULTURE FOR BACTERIA: CPT | Performed by: INTERNAL MEDICINE

## 2025-03-23 PROCEDURE — 85610 PROTHROMBIN TIME: CPT | Performed by: STUDENT IN AN ORGANIZED HEALTH CARE EDUCATION/TRAINING PROGRAM

## 2025-03-23 PROCEDURE — 83735 ASSAY OF MAGNESIUM: CPT | Performed by: STUDENT IN AN ORGANIZED HEALTH CARE EDUCATION/TRAINING PROGRAM

## 2025-03-23 PROCEDURE — 250N000011 HC RX IP 250 OP 636: Performed by: STUDENT IN AN ORGANIZED HEALTH CARE EDUCATION/TRAINING PROGRAM

## 2025-03-23 PROCEDURE — 87641 MR-STAPH DNA AMP PROBE: CPT | Performed by: STUDENT IN AN ORGANIZED HEALTH CARE EDUCATION/TRAINING PROGRAM

## 2025-03-23 PROCEDURE — 258N000003 HC RX IP 258 OP 636: Performed by: STUDENT IN AN ORGANIZED HEALTH CARE EDUCATION/TRAINING PROGRAM

## 2025-03-23 PROCEDURE — HZ2ZZZZ DETOXIFICATION SERVICES FOR SUBSTANCE ABUSE TREATMENT: ICD-10-PCS | Performed by: INTERNAL MEDICINE

## 2025-03-23 PROCEDURE — 85027 COMPLETE CBC AUTOMATED: CPT | Performed by: STUDENT IN AN ORGANIZED HEALTH CARE EDUCATION/TRAINING PROGRAM

## 2025-03-23 PROCEDURE — 200N000001 HC R&B ICU

## 2025-03-23 RX ORDER — ALBUMIN (HUMAN) 12.5 G/50ML
50 SOLUTION INTRAVENOUS DAILY
Status: DISCONTINUED | OUTPATIENT
Start: 2025-03-23 | End: 2025-03-24

## 2025-03-23 RX ORDER — LACTULOSE 10 G/15ML
10 SOLUTION ORAL 3 TIMES DAILY
Status: DISCONTINUED | OUTPATIENT
Start: 2025-03-23 | End: 2025-03-23

## 2025-03-23 RX ORDER — CEFEPIME HYDROCHLORIDE 2 G/1
2 INJECTION, POWDER, FOR SOLUTION INTRAVENOUS EVERY 12 HOURS
Status: DISCONTINUED | OUTPATIENT
Start: 2025-03-23 | End: 2025-03-24

## 2025-03-23 RX ORDER — FLUMAZENIL 0.1 MG/ML
0.2 INJECTION, SOLUTION INTRAVENOUS
Status: DISCONTINUED | OUTPATIENT
Start: 2025-03-23 | End: 2025-03-23

## 2025-03-23 RX ORDER — CEFEPIME HYDROCHLORIDE 2 G/1
2 INJECTION, POWDER, FOR SOLUTION INTRAVENOUS EVERY 12 HOURS
Status: DISCONTINUED | OUTPATIENT
Start: 2025-03-23 | End: 2025-03-23

## 2025-03-23 RX ORDER — POTASSIUM CHLORIDE 29.8 MG/ML
20 INJECTION INTRAVENOUS
Status: COMPLETED | OUTPATIENT
Start: 2025-03-23 | End: 2025-03-23

## 2025-03-23 RX ORDER — FLUMAZENIL 0.1 MG/ML
0.2 INJECTION, SOLUTION INTRAVENOUS
Status: DISCONTINUED | OUTPATIENT
Start: 2025-03-23 | End: 2025-03-25 | Stop reason: HOSPADM

## 2025-03-23 RX ORDER — MAGNESIUM SULFATE HEPTAHYDRATE 40 MG/ML
4 INJECTION, SOLUTION INTRAVENOUS ONCE
Status: COMPLETED | OUTPATIENT
Start: 2025-03-23 | End: 2025-03-23

## 2025-03-23 RX ORDER — SODIUM CHLORIDE AND POTASSIUM CHLORIDE 150; 900 MG/100ML; MG/100ML
INJECTION, SOLUTION INTRAVENOUS CONTINUOUS
Status: DISCONTINUED | OUTPATIENT
Start: 2025-03-23 | End: 2025-03-23

## 2025-03-23 RX ORDER — HALOPERIDOL 5 MG/ML
2.5-5 INJECTION INTRAMUSCULAR EVERY 6 HOURS PRN
Status: DISCONTINUED | OUTPATIENT
Start: 2025-03-23 | End: 2025-03-23

## 2025-03-23 RX ORDER — FOLIC ACID 1 MG/1
1 TABLET ORAL DAILY
Status: DISCONTINUED | OUTPATIENT
Start: 2025-03-23 | End: 2025-03-25 | Stop reason: HOSPADM

## 2025-03-23 RX ORDER — OLANZAPINE 5 MG/1
5-10 TABLET, ORALLY DISINTEGRATING ORAL EVERY 6 HOURS PRN
Status: DISCONTINUED | OUTPATIENT
Start: 2025-03-23 | End: 2025-03-23

## 2025-03-23 RX ORDER — LACTULOSE 10 G/15ML
10 SOLUTION ORAL 3 TIMES DAILY
Status: DISCONTINUED | OUTPATIENT
Start: 2025-03-23 | End: 2025-03-24

## 2025-03-23 RX ORDER — MULTIPLE VITAMINS W/ MINERALS TAB 9MG-400MCG
1 TAB ORAL DAILY
Status: DISCONTINUED | OUTPATIENT
Start: 2025-03-23 | End: 2025-03-25 | Stop reason: HOSPADM

## 2025-03-23 RX ORDER — SODIUM CHLORIDE AND POTASSIUM CHLORIDE 150; 900 MG/100ML; MG/100ML
INJECTION, SOLUTION INTRAVENOUS CONTINUOUS
Status: DISCONTINUED | OUTPATIENT
Start: 2025-03-23 | End: 2025-03-25

## 2025-03-23 RX ORDER — DIAZEPAM 10 MG/2ML
5-10 INJECTION, SOLUTION INTRAMUSCULAR; INTRAVENOUS EVERY 30 MIN PRN
Status: DISCONTINUED | OUTPATIENT
Start: 2025-03-23 | End: 2025-03-23

## 2025-03-23 RX ORDER — DIAZEPAM 5 MG/1
10 TABLET ORAL EVERY 30 MIN PRN
Status: DISCONTINUED | OUTPATIENT
Start: 2025-03-23 | End: 2025-03-23

## 2025-03-23 RX ADMIN — OXYCODONE HYDROCHLORIDE 10 MG: 5 TABLET ORAL at 08:28

## 2025-03-23 RX ADMIN — OXYCODONE HYDROCHLORIDE 10 MG: 5 TABLET ORAL at 21:12

## 2025-03-23 RX ADMIN — SODIUM PHOSPHATE, MONOBASIC, MONOHYDRATE AND SODIUM PHOSPHATE, DIBASIC, ANHYDROUS 15 MMOL: 142; 276 INJECTION, SOLUTION INTRAVENOUS at 02:34

## 2025-03-23 RX ADMIN — Medication 1 TABLET: at 12:25

## 2025-03-23 RX ADMIN — CEFEPIME 2 G: 2 INJECTION, POWDER, FOR SOLUTION INTRAVENOUS at 21:10

## 2025-03-23 RX ADMIN — SODIUM PHOSPHATE, MONOBASIC, MONOHYDRATE AND SODIUM PHOSPHATE, DIBASIC, ANHYDROUS 9 MMOL: 142; 276 INJECTION, SOLUTION INTRAVENOUS at 08:30

## 2025-03-23 RX ADMIN — LACTULOSE 10 G: 20 SOLUTION ORAL at 21:09

## 2025-03-23 RX ADMIN — OXYCODONE HYDROCHLORIDE 10 MG: 5 TABLET ORAL at 03:02

## 2025-03-23 RX ADMIN — CEFEPIME 2 G: 2 INJECTION, POWDER, FOR SOLUTION INTRAVENOUS at 10:11

## 2025-03-23 RX ADMIN — GABAPENTIN 100 MG: 100 CAPSULE ORAL at 08:29

## 2025-03-23 RX ADMIN — POTASSIUM CHLORIDE 20 MEQ: 29.8 INJECTION, SOLUTION INTRAVENOUS at 01:51

## 2025-03-23 RX ADMIN — SODIUM CHLORIDE 1000 ML: 0.9 INJECTION, SOLUTION INTRAVENOUS at 11:11

## 2025-03-23 RX ADMIN — POTASSIUM CHLORIDE AND SODIUM CHLORIDE: 900; 150 INJECTION, SOLUTION INTRAVENOUS at 02:04

## 2025-03-23 RX ADMIN — POTASSIUM CHLORIDE AND SODIUM CHLORIDE: 900; 150 INJECTION, SOLUTION INTRAVENOUS at 21:09

## 2025-03-23 RX ADMIN — MAGNESIUM SULFATE HEPTAHYDRATE 4 G: 40 INJECTION, SOLUTION INTRAVENOUS at 03:45

## 2025-03-23 RX ADMIN — OXYCODONE HYDROCHLORIDE 10 MG: 5 TABLET ORAL at 12:25

## 2025-03-23 RX ADMIN — ALBUMIN HUMAN 50 G: 0.25 SOLUTION INTRAVENOUS at 10:53

## 2025-03-23 RX ADMIN — THIAMINE HCL TAB 100 MG 100 MG: 100 TAB at 12:25

## 2025-03-23 RX ADMIN — GABAPENTIN 100 MG: 100 CAPSULE ORAL at 16:41

## 2025-03-23 RX ADMIN — POTASSIUM CHLORIDE 20 MEQ: 29.8 INJECTION, SOLUTION INTRAVENOUS at 02:36

## 2025-03-23 RX ADMIN — POTASSIUM CHLORIDE AND SODIUM CHLORIDE: 900; 150 INJECTION, SOLUTION INTRAVENOUS at 13:09

## 2025-03-23 RX ADMIN — GABAPENTIN 100 MG: 100 CAPSULE ORAL at 23:24

## 2025-03-23 RX ADMIN — OXYCODONE HYDROCHLORIDE 10 MG: 5 TABLET ORAL at 16:41

## 2025-03-23 RX ADMIN — VANCOMYCIN HYDROCHLORIDE 1250 MG: 10 INJECTION, POWDER, LYOPHILIZED, FOR SOLUTION INTRAVENOUS at 10:42

## 2025-03-23 RX ADMIN — LACTULOSE 10 G: 20 SOLUTION ORAL at 16:42

## 2025-03-23 RX ADMIN — FOLIC ACID 1 MG: 1 TABLET ORAL at 12:25

## 2025-03-23 ASSESSMENT — ACTIVITIES OF DAILY LIVING (ADL)
ADLS_ACUITY_SCORE: 55
ADLS_ACUITY_SCORE: 61
ADLS_ACUITY_SCORE: 55
ADLS_ACUITY_SCORE: 61
ADLS_ACUITY_SCORE: 55
ADLS_ACUITY_SCORE: 61
ADLS_ACUITY_SCORE: 55
ADLS_ACUITY_SCORE: 61

## 2025-03-23 NOTE — CONSULTS
GASTROENTEROLOGY CONSULTATION      Pauline Merino  [address]  [facility] [address]  49 year old female     Admission Date/Time: 3/22/2025  Primary Care Provider: System, Provider Not In     We were asked to see the patient in consultation by Law Moore MD  for evaluation of decompensated cirrhosis.    CC: Dizziness, abdominal, and back pain      HPI:  Pauline Merino is a 49 year old female with past medical history of hypertension, diabetes type 2, alcohol use disorder, decompensated liver disease from alcohol use.    She is an established patient at [facility].  I saw her in clinic on March 3.    Admission labs notable for sodium 134, potassium 3.1, chloride 82, carbon dioxide 3, alkaline phosphatase 181, , ALT 28, total bilirubin 4.2, serum ammonia 237, lactic acid 19, WBC 15.1, hemoglobin 9.7, , INR 2.28.    Previous viral hep serologies were negative.  She was seen in clinic and underwent other serological workup which were negative.  PETH was positive indicative of moderate to heavy alcohol use.    Cross-sectional imaging notable for cirrhosis of liver with a recanalized umbilical vein, and moderate ascites.  No focal liver mass or lesion.  Pancreas within normal range.  Spleen within normal limits.  She underwent paracentesis with 3 L removal.  Fluid analysis negative for SBP.  Absolute neutrophil counts 48.3.  SAAG 1.2 g/dl consistent with a portal hypertension.  She was started on ceftriaxone and admitted to ICU.  ID consulted, recommended ruling out other sources of infection.  Ceftriaxone was discontinued.  She was started on cefixime, and Vanco.  MRI spine, blood cultures, and UA ordered.    Per nursing, she had lactulose yesterday and had several bowel movements.  Morning lactulose was held.      She is alert and oriented.  No asterixis noted.  She acknowledges ongoing alcohol use.  She tells me that she will not be drinking alcohol anymore.       PAST MEDICAL  HISTORY:  Patient Active Problem List    Diagnosis Date Noted    Alcoholic ketoacidosis 03/22/2025     Priority: Medium    Elevated INR 03/22/2025     Priority: Medium    Hypomagnesemia 11/08/2024     Priority: Medium    Ketosis (H) 11/08/2024     Priority: Medium    Generalized abdominal pain 11/08/2024     Priority: Medium    Transaminitis 11/08/2024     Priority: Medium    Alcoholic cirrhosis of liver with ascites (H) 11/08/2024     Priority: Medium    Alcohol dependence with uncomplicated withdrawal (H) 11/08/2024     Priority: Medium    Alcohol use disorder 11/08/2024     Priority: Medium    Acute alcoholic intoxication 08/01/2024     Priority: Medium    Pulmonary nodule 12/26/2023     Priority: Medium     New 8 mm groundglass nodule in the right upper lobe, likely infectious/inflammatory.  Repeat CT in 6-12 months      Primary hypertension 09/27/2023     Priority: Medium    Increased anion gap metabolic acidosis 09/27/2023     Priority: Medium    Bilateral leg pain 09/27/2023     Priority: Medium    Elevated liver enzymes 05/24/2022     Priority: Medium    Type 2 diabetes mellitus without complication, without long-term current use of insulin (H) 05/23/2022     Priority: Medium          ROS: A comprehensive ten point review of systems was negative aside from those in mentioned in the HPI.       MEDICATIONS:   Prior to Admission medications    Medication Sig Start Date End Date Taking? Authorizing Provider   folic acid (FOLVITE) 1 MG tablet Take 1 tablet (1 mg) by mouth daily. 11/11/24  Yes Lopez Bardales MD   furosemide (LASIX) 20 MG tablet Take 1 tablet (20 mg) by mouth daily.  Patient taking differently: Take 20 mg by mouth 2 times daily. 11/11/24  Yes Lopez Bardales MD   gabapentin (NEURONTIN) 100 MG capsule Take 100 mg by mouth 3 times daily.   Yes Unknown, Entered By History   multivitamin w/minerals (THERA-VIT-M) tablet Take 1 tablet by mouth daily. 11/11/24  Yes Lopez Bardales MD    naltrexone (DEPADE/REVIA) 50 MG tablet Take 50 mg by mouth daily.   Yes Unknown, Entered By History   spironolactone (ALDACTONE) 50 MG tablet Take 1 tablet (50 mg) by mouth daily. 11/11/24  Yes Lopez Bardales MD        ALLERGIES: No Known Allergies     SOCIAL HISTORY:  Social History     Tobacco Use    Smoking status: Every Day     Current packs/day: 0.25     Types: Cigarettes    Smokeless tobacco: Current   Substance Use Topics    Alcohol use: Yes        FAMILY HISTORY:  Family History   Adopted: Yes   Problem Relation Age of Onset    Medical History Unknown Mother         PHYSICAL EXAM:   BP 94/50   Pulse 98   Temp 100.5  F (38.1  C) (Oral)   Resp (!) 9   Wt 70.5 kg (155 lb 6.8 oz)   SpO2 94%   BMI 25.09 kg/m       General: alert, oriented, NAD  SKIN: no suspicious lesions, rashes, jaundice, or spider angiomas  EYES: No scleral icterus  RESPIRATORY: Non labored breathing, Lungs clear  CARDIOVASCULAR:  RRR. No murmurs, clicks gallops or rub  GASTROINTESTINAL: Active bowel sounds,  abdomen soft and generalized tenderness on palpation.  JOINT/EXTREMITIES: extremities normal- no gross deformities noted. No edema noted  NEURO: Grossly WNL  PSYCH: no abnormal anxiety/depression       LABS:  I reviewed the patient's new clinical lab test results.   Recent Labs   Lab Test 03/23/25 0619 03/23/25  0014 03/22/25  1318 11/10/24  1134 11/09/24  0736   WBC 6.8 5.9 15.1*   < > 6.2   HGB 7.9* 7.6* 9.7*   < > 8.9*   MCV 90 92 102*   < > 97   PLT 59* 61* 203   < > 133*   INR 2.32*  --  2.28*  --  1.48*    < > = values in this interval not displayed.     Recent Labs   Lab Test 03/23/25  0619 03/23/25  0014 03/22/25  1318   * 128* 134*   POTASSIUM 3.6 3.1* 3.1*   CHLORIDE 84* 78* 82*   CO2 27 19* 3*   BUN 23.8* 20.5* 13.8   ANIONGAP 18* 31* 49*   ALEX 8.3* 8.6* 9.6     Recent Labs   Lab Test 03/23/25  0619 03/22/25  1318 11/10/24  1134 11/09/24  0736 11/08/24  1810 11/08/24  1735   ALBUMIN 3.5 4.3 3.0*   < >  --   3.8  3.8   BILITOTAL 5.1* 4.2* 1.8*   < >  --  2.1*   ALT 72* 28 23   < >  --  34   * 104* 170*   < >  --  223*   ALKPHOS 101 181* 209*   < >  --  247*   PROTEIN  --   --   --   --  20*  --    LIPASE  --  31  --   --   --  25    < > = values in this interval not displayed.      MELD 3.0: 31 at 3/23/2025  6:19 AM  MELD-Na: 30 at 3/23/2025  6:19 AM  Calculated from:  Serum Creatinine: 1.58 mg/dL at 3/23/2025  6:19 AM  Serum Sodium: 129 mmol/L at 3/23/2025  6:19 AM  Total Bilirubin: 5.1 mg/dL at 3/23/2025  6:19 AM  Serum Albumin: 3.5 g/dL at 3/23/2025  6:19 AM  INR(ratio): 2.32 at 3/23/2025  6:19 AM  Age at listing (hypothetical): 49 years  Sex: Female at 3/23/2025  6:19 AM      IMAGING/PROCEDURES:    CT 3/23  FINDINGS:   LOWER CHEST: Normal.     HEPATOBILIARY: Cirrhosis with changes of portal venous hypertension with recanalized umbilical vein and multiple collateral vessels and moderate ascites. No liver masses.     PANCREAS: Normal.     SPLEEN: 12.0 cm     ADRENAL GLANDS: Normal.     KIDNEYS/BLADDER: Tiny 2 mm nonobstructing stone right kidney unchanged.     BOWEL: Normal.     LYMPH NODES: Normal.     VASCULATURE: Normal.     PELVIC ORGANS: Tiny uterine fibroid.     MUSCULOSKELETAL: No concerning bone lesion.                                                                      IMPRESSION:   1.  Cirrhosis with recanalized umbilical vein and moderate ascites similar to previous.  2. No significant new findings.        I personally reviewed the patient's new imaging results.    Problem list pertaining to GI:  Alcoholic cirrhosis with ascites  Ongoing alcohol use    Assessment: 49 old female with decompensated cirrhosis of liver secondary to alcohol use.  Unfortunately she is still drinking.  MELD 31.  Parasitic fluid analysis negative for SBP.  Liver enzymes consistent with alcoholic hepatitis.  Previous imaging in November 2024 negative for gallstones or gallbladder wall thickening.  Recent cross-sectional  imaging showed cirrhotic liver without other pathology.  ID is involved working her up for other causes of infection.     Hemoglobin is low at 7.9.  MCV 90.  No signs of overt GI bleeding.  Her anemia is most likely from bone marrow suppression from alcohol use.  She might need endoscopy down the road to assess for esophageal varices, and portal hypertensive gastropathy.    Plan:  -ID following to rule out other causes of infection  -Trend MELD labs  -Paracentesis as needed  -Low-sodium diet  -Alcohol cessation  -Not a candidate for liver transplant with active drinking   -Consult chemical dependency  -Monitor for alcohol withdrawal symptoms and treat  -Continue lactulose 10 g 3 times daily  -Follow-up in outpatient liver clinic  -Outpatient endoscopy or during this admission if she were to develop signs of GI bleed.    I will discuss with Dr. Syed  Thank you for allowing me to participate in the care of this patient.  Please contact me with any questions or concerns.    Total time spent:  40 minutes was spent providing patient care, including patient evaluation, reviewing documentation/test results, and . Thank you for asking us to participate in the care of this patient.      Grace Owusu, CNP   [facility] ([facility])  [phone]

## 2025-03-23 NOTE — SIGNIFICANT EVENT
Significant Event Note    Time of event: 10:14 PM March 22, 2025    Description of event:  Paged about critical value Lactic acid of 17. Trending down from 19, hemodynamically stable, eating, drinking, admitted with ETOH ketoacidosis and lactic acidosis.    Plan:  - Ctn isotonic bicarb infusion as prior  - Encourage hydration  - Trend lactate    Discussed with: bedside nurse    Donnell Nguyen MD

## 2025-03-23 NOTE — PLAN OF CARE
Problem: Pain Acute  Goal: Optimal Pain Control and Function  Intervention: Develop Pain Management Plan  Recent Flowsheet Documentation  Taken 3/23/2025 0000 by Darlene Deshpande RN  Pain Management Interventions:   rest   repositioned  Taken 3/22/2025 2200 by Darlene Deshpande RN  Pain Management Interventions:   rest   repositioned  Taken 3/22/2025 2000 by Darlene Deshpande RN  Pain Management Interventions:   rest   repositioned  Taken 3/22/2025 1751 by Darlene Deshpande RN  Pain Management Interventions:   rest   repositioned     Problem: Adult Inpatient Plan of Care  Goal: Plan of Care Review  Description: The Plan of Care Review/Shift note should be completed every shift.  The Outcome Evaluation is a brief statement about your assessment that the patient is improving, declining, or no change.  This information will be displayed automatically on your shiftnote.  Flowsheets (Taken 3/23/2025 0356)  Plan of Care Reviewed With: patient  Overall Patient Progress: improving   Goal Outcome Evaluation:      Plan of Care Reviewed With: patient    Overall Patient Progress: improvingOverall Patient Progress: improving     Patient is A&O x 4, BP soft, MAP > 65, ST to SR. LSC, Pain relief per MAP. All the critical labs result were addressed with the intensives on call, Electrolytes replaced per protocol. IV fluid and drips adjusted per labs results. Up with A x 1 to bedside commode, voiding adequate amount, have BM x 5 this sift. PICC line in place.Continue with plan of care.

## 2025-03-23 NOTE — PLAN OF CARE
Problem: Adult Inpatient Plan of Care  Goal: Absence of Hospital-Acquired Illness or Injury  Intervention: Identify and Manage Fall Risk  Recent Flowsheet Documentation  Taken 3/23/2025 1200 by Laura Goodman RN  Safety Promotion/Fall Prevention:   treat underlying cause   treat reversible contributory factors   clutter free environment maintained   safety round/check completed  Taken 3/23/2025 0800 by Laura Goodman RN  Safety Promotion/Fall Prevention:   treat underlying cause   treat reversible contributory factors   clutter free environment maintained   safety round/check completed     Problem: Adult Inpatient Plan of Care  Goal: Optimal Comfort and Wellbeing  Intervention: Monitor Pain and Promote Comfort  Recent Flowsheet Documentation  Taken 3/23/2025 1225 by Laura Goodman RN  Pain Management Interventions: medication (see MAR)  Taken 3/23/2025 0828 by Laura Goodman RN  Pain Management Interventions: medication (see MAR)     Problem: Pain Acute  Goal: Optimal Pain Control and Function  Intervention: Develop Pain Management Plan  Recent Flowsheet Documentation  Taken 3/23/2025 1225 by Laura Goodman RN  Pain Management Interventions: medication (see MAR)  Taken 3/23/2025 0828 by Laura Goodman RN  Pain Management Interventions: medication (see MAR)       Goal Outcome Evaluation:  Neuro: A&Ox4. CIWA q4, not scoring.   CV: SR. BP soft but stable  Resp: placed on 2L NC for desatting while sleeping  GI/: multiple BMs, is voiding but unable to get urine sample due to stool contamination.   Skin: intact.  Pain/Gtts: oxycodone for pain. Scheduled gabapentin.   Family: updated at bedside.   POC:  regular diet. albumin and fluids given. Lactic trending down. New fever, blood cultures adjusted. MRSA in nares came back positive.

## 2025-03-23 NOTE — PHARMACY-VANCOMYCIN DOSING SERVICE
Pharmacy Vancomycin Initial Note  Date of Service 2025  Patient's  1975  49 year old, female    Indication: Sepsis    Current estimated CrCl = Estimated Creatinine Clearance: 48.5 mL/min (A) (based on SCr of 1.58 mg/dL (H)).    Creatinine for last 3 days  3/22/2025:  1:18 PM Creatinine 0.86 mg/dL  3/23/2025: 12:14 AM Creatinine 1.31 mg/dL;  6:19 AM Creatinine 1.58 mg/dL    Recent Vancomycin Level(s) for last 3 days  No results found for requested labs within last 3 days.      Vancomycin IV Administrations (past 72 hours)        No vancomycin orders with administrations in past 72 hours.                    Nephrotoxins and other renal medications (From now, onward)      Start     Dose/Rate Route Frequency Ordered Stop    25 1000  vancomycin (VANCOCIN) 1,250 mg in 0.9% NaCl 262.5 mL intermittent infusion         1,250 mg  over 90 Minutes Intravenous EVERY 24 HOURS 25 0854              Contrast Orders - past 72 hours (72h ago, onward)      Start     Dose/Rate Route Frequency Stop    25 1410  iopamidol (ISOVUE-370) solution 91 mL         91 mL Intravenous ONCE 25 1502            [employer] Prediction of Planned Initial Vancomycin Regimen  Loading dose: N/A  Regimen: 1250 mg IV every 24 hours.  Start time: 08:52 on 2025  Exposure target: AUC24 (range)400-600 mg/L.hr   AUC24,ss: 532 mg/L.hr  Probability of AUC24 > 400: 81 %  Ctrough,ss: 16.2 mg/L  Probability of Ctrough,ss > 20: 29 %  Probability of nephrotoxicity (Lodise DENISSE ): 12 %        Plan:  Start vancomycin 1250 mg IV q24h.   Vancomycin monitoring method: AUC  Vancomycin therapeutic monitoring goal: 400-600 mg*h/L  Pharmacy will check vancomycin levels as appropriate in 1-3 Days. Consider earlier level given ANAID and anuria.   Serum creatinine levels will be ordered daily for the first week of therapy and at least twice weekly for subsequent weeks.      Saima Alves, PharmD

## 2025-03-23 NOTE — PROCEDURES
[facility]    Triple Lumen PICC Placement    Date/Time: 3/22/2025 8:53 PM    Performed by: Lee Alarcon RN  Authorized by: Law Moore MD  Indications: vascular access      UNIVERSAL PROTOCOL   Site Marked: Yes  Prior Images Obtained and Reviewed:  Yes  Required items: Required blood products, implants, devices and special equipment available    Patient identity confirmed:  Verbally with patient, arm band and hospital-assigned identification number  NA - No sedation, light sedation, or local anesthesia  Confirmation Checklist:  Patient's identity using two indicators, relevant allergies, procedure was appropriate and matched the consent or emergent situation and correct equipment/implants were available  Time out: Immediately prior to the procedure a time out was called    Universal Protocol: the Joint Commission Universal Protocol was followed    Preparation: Patient was prepped and draped in usual sterile fashion    ESBL (mL):  2     ANESTHESIA    Anesthesia:  Local infiltration  Local Anesthetic:  Lidocaine 1% without epinephrine  Anesthetic Total (mL):  2      SEDATION    Patient Sedated: No        Preparation: skin prepped with 2% chlorhexidine  Skin prep agent: skin prep agent completely dried prior to procedure  Sterile barriers: maximum sterile barriers were used: cap, mask, sterile gown, sterile gloves, and large sterile sheet  Hand hygiene: hand hygiene performed prior to central venous catheter insertion  Type of line used: PICC  Catheter type: triple lumen  Lumen type: valved and power PICC  Lumen Identification: Red, White and Gray  Catheter size: 5 Fr  Brand: Bard  Lot number: [id number]  Placement method: venipuncture, MST and ultrasound (CXR)  Number of attempts: 1  Difficulty threading catheter: no  Successful placement: yes  Orientation: right    Location: basilic vein  Tip Location: SVC  Arm circumference: adults 10 cm  Extremity circumference: 25  Visible catheter length:  2  Total catheter length: 38  Dressing and securement: adhesive securement device, blood cleaned with CHG, blood removed, chlorhexidine disc applied, dressing applied, line secured, sterile dressing applied, thrombin hemostasis patch applied and transparent securement dressing  Post procedure assessment: blood return through all ports and placement verified by x-ray  Complications: none.  PROCEDURE Describe Procedure: 3L PICC line placed successfully. Lines flushed with NS and with good blood return. Hemostat applied d/t bleeding.  Disposal: sharps and needle count correct at the end of procedure, needles and guidewire disposed in sharps container  Patient Tolerance:  Patient tolerated the procedure well with no immediate complications

## 2025-03-23 NOTE — CONSULTS
"[facility]  INFECTIOUS DISEASES CONSULTATION  Pauline Merino 3/23/2025    History  50 yo woman with history of HTN, Etoh abuse, DM  Prior history of ascites with last paracentesis a few weeks ago  Presented to the hospital 3/22 with 2 days of abd pain, nausea and vomiting.  Also reports back pain but has had this for a few years due to bulging discs she reports.. No loose stools  No fever, chills or sweats, . No cough, cp or SOB  No burning with urination  No headache, vision changes,   Her WBC was 15.1  Lactic aci was >19 initially but has improved to 4.5 this am  CT abd showed, \" Cirrhosis with recanalized umbilical vein and moderate ascites similar to previous.\"  She underwent paracentesis with 690 total cells, 7% neutrophils, 71% lymphocytes  Started on iv ceftriaxone and admitted to ICU  No pressor need but received albumin and iv fluids with improved Bps  New fever to 38.3 this am     ROS: 10 point ROS neg other than the symptoms noted above in the HPI.    Past Medical History:   Diagnosis Date    BMI 31.0-31.9,adult     Dermatitis      Past Surgical History:   Procedure Laterality Date    HC TOOTH EXTRACTION W/FORCEP       Social History     Social History Narrative    ** Merged History Encounter **          Family History   Adopted: Yes   Problem Relation Age of Onset    Medical History Unknown Mother         No Known Allergies  Current Facility-Administered Medications   Medication Dose Route Frequency Provider Last Rate Last Admin    0.9% sodium chloride + KCl 20 mEq/L infusion   Intravenous Continuous Carlos Dugan  mL/hr at 03/23/25 0204 New Bag at 03/23/25 0204    cefTRIAXone (ROCEPHIN) 2 g vial to attach to  ml bag for ADULTS or NS 50 ml bag for PEDS  2 g Intravenous Q24H Law Moore MD   2 g at 03/22/25 2134    glucose gel 15-30 g  15-30 g Oral Q15 Min PRN Law Moore MD        Or    dextrose 50 % injection 25-50 mL  25-50 mL Intravenous Q15 Min " PRN Law Moore MD        Or    glucagon injection 1 mg  1 mg Subcutaneous Q15 Min PRN Law Moore MD        gabapentin (NEURONTIN) capsule 100 mg  100 mg Oral or NG Tube TID Law Moore MD   100 mg at 03/22/25 2146    lactulose (CHRONULAC) solution 20 g  20 g Oral TID Ruben Forde MD   20 g at 03/22/25 2146    lidocaine (LMX4) cream   Topical Q1H PRN Law Moore MD        lidocaine 1 % 0.1-5 mL  0.1-5 mL Other Q1H PRN Law Moore MD   2 mL at 03/22/25 1945    naloxone (NARCAN) injection 0.2 mg  0.2 mg Intravenous Q2 Min PRN Law Moore MD        Or    naloxone (NARCAN) injection 0.4 mg  0.4 mg Intravenous Q2 Min PRN Law Moore MD        Or    naloxone (NARCAN) injection 0.2 mg  0.2 mg Intramuscular Q2 Min PRN Law Moore MD        Or    naloxone (NARCAN) injection 0.4 mg  0.4 mg Intramuscular Q2 Min PRN Law Moore MD        oxyCODONE (ROXICODONE) tablet 10 mg  10 mg Oral Q4H PRN Law Moore MD   10 mg at 03/23/25 0302    oxyCODONE (ROXICODONE) tablet 5 mg  5 mg Oral Q4H PRN Law Moore MD        sodium chloride (PF) 0.9% PF flush 10-20 mL  10-20 mL Intracatheter q1 min prn Law Moore MD        sodium chloride (PF) 0.9% PF flush 10-40 mL  10-40 mL Intracatheter Once PRN Law Moore MD        sodium chloride (PF) 0.9% PF flush 10-40 mL  10-40 mL Intracatheter Q7 Days Law Moore MD   10 mL at 03/22/25 2151    sodium chloride (PF) 0.9% PF flush 10-40 mL  10-40 mL Intracatheter Daily Law Moore MD   10 mL at 03/22/25 2148    sodium phosphate 9 mmol in 250 mL NS intermittent infusion  9 mmol Intravenous Once Law Moore MD               Physical Examination  /52   Pulse 94   Temp 101  F (38.3  C) (Oral)   Resp 18   Wt 70.5 kg (155 lb 6.8 oz)   SpO2 (!) 90%   BMI 25.09 kg/m    HEENT:, scleral anicteric , no scleral hemorrhages,   Op clear  Neck supple, no VIDAL  CV: RRR no m/r/g  Lungs CTA bilat  Abd soft, NT, ND  Ext; no c/c/e, no splinter hemorrhages or nodes    LABORATORY  "DATA  Lab Results   Component Value Date    WBC 6.8 03/23/2025    WBC 7.3 12/30/2021     Lab Results   Component Value Date    RBC 2.67 03/23/2025    RBC 4.65 12/30/2021     Lab Results   Component Value Date    HGB 7.9 03/23/2025    HGB 14.5 12/30/2021     Lab Results   Component Value Date    HCT 23.9 03/23/2025    HCT 44.4 12/30/2021     No components found for: \"MCT\"  Lab Results   Component Value Date    MCV 90 03/23/2025    MCV 95.5 12/30/2021     Lab Results   Component Value Date    MCH 29.6 03/23/2025    MCH 31.3 12/30/2021     Lab Results   Component Value Date    MCHC 33.1 03/23/2025    MCHC 32.8 12/30/2021     Lab Results   Component Value Date    RDW 17.5 03/23/2025     Lab Results   Component Value Date    PLT 59 03/23/2025     12/30/2021     Last Comprehensive Metabolic Panel:  Sodium   Date Value Ref Range Status   03/23/2025 129 (L) 135 - 145 mmol/L Final   05/23/2022 138 134 - 144 mmol/L Final     Potassium   Date Value Ref Range Status   03/23/2025 3.6 3.4 - 5.3 mmol/L Final   05/23/2022 4.0 3.5 - 5.2 mmol/L Final     Chloride   Date Value Ref Range Status   03/23/2025 84 (L) 98 - 107 mmol/L Final   05/23/2022 101 96 - 106 mmol/L Final     Carbon Dioxide (CO2)   Date Value Ref Range Status   03/23/2025 27 22 - 29 mmol/L Final     Anion Gap   Date Value Ref Range Status   03/23/2025 18 (H) 7 - 15 mmol/L Final     Glucose   Date Value Ref Range Status   03/23/2025 169 (H) 70 - 99 mg/dL Final   05/23/2022 184 (H) 65 - 99 mg/dL Final     GLUCOSE BY METER POCT   Date Value Ref Range Status   03/23/2025 157 (H) 70 - 99 mg/dL Final     Urea Nitrogen   Date Value Ref Range Status   03/23/2025 23.8 (H) 6.0 - 20.0 mg/dL Final   05/23/2022 15 6 - 24 mg/dL Final     BUN/Creatinine Ratio   Date Value Ref Range Status   05/23/2022 25 (H) 9 - 23 Final     Creatinine   Date Value Ref Range Status   03/23/2025 1.58 (H) 0.51 - 0.95 mg/dL Final   05/23/2022 0.59 0.57 - 1.00 mg/dL Final     GFR Estimate   Date " Value Ref Range Status   03/23/2025 40 (L) >60 mL/min/1.73m2 Final     Comment:     eGFR calculated using 2021 CKD-EPI equation.     Calcium   Date Value Ref Range Status   03/23/2025 8.3 (L) 8.8 - 10.4 mg/dL Final   05/23/2022 9.8 8.7 - 10.2 mg/dL Final     Bilirubin Total   Date Value Ref Range Status   03/23/2025 5.1 (H) <=1.2 mg/dL Final   05/23/2022 0.4 0.0 - 1.2 mg/dL Final     Alkaline Phosphatase   Date Value Ref Range Status   03/23/2025 101 40 - 150 U/L Final   05/23/2022 113 44 - 121 IU/L Final     ALT   Date Value Ref Range Status   03/23/2025 72 (H) 0 - 50 U/L Final   05/23/2022 72 (H) 0 - 32 IU/L Final     AST   Date Value Ref Range Status   03/23/2025 431 (H) 0 - 45 U/L Final   05/23/2022 47 (H) 0 - 40 IU/L Final       MICROBIOLOGY         03/22/2025 1634 03/22/2025 1824 Cell count with differential fluid [[id number]]    (Abnormal)   Ascites Fluid from Paracentesis    In process Component Value   No component results          03/22/2025 1634 03/22/2025 1719 Albumin fluid [[id number]]    Ascites Fluid from Paracentesis    Final result Component Value Units   Albumin Fluid Source Abdomen    Albumin fluid 2.3 g/dL          03/22/2025 1634 03/22/2025 1719 Protein fluid [[id number]]    Ascites Fluid from Paracentesis    Final result Component Value Units   Protein Fluid Source Abdomen    Protein Total Fluid 4.8 g/dL          03/22/2025 1634 03/22/2025 2124 Ascites Fluid Aerobic Bacterial Culture Routine With Gram Stain [[id number]]   Ascites Fluid from Peritoneum    Preliminary result Component Value   Gram Stain Result No organisms seen P    2+ WBC seen P             03/22/2025 1634 03/22/2025 1824 Cell Count Body Fluid [[id number]]    (Abnormal)   Ascites Fluid from Paracentesis    Final result Component Value Units   Color Yellow    Clarity Hazy Abnormal     Cell Count Fluid Source Abdomen    Total Nucleated Cells 690 /uL          03/22/2025 1634 03/22/2025 1823 Differential Body Fluid  [[id number]]    Ascites Fluid from Paracentesis    Preliminary result Component Value Units   % Neutrophils 7 P %   % Lymphocytes 71 P %   % Monocyte/Macrophages 20 P %   % Lining Cells 2 P %   Absolute Neutrophils, Body Fluid 48.3 P /uL                 RADIOLOGY  EXAM: CT ABDOMEN PELVIS W CONTRAST  LOCATION: [facility] [place]  DATE: 3/22/2025     INDICATION: Distention, vomiting, h o ETOH  COMPARISON: 11/8/2024 CT  TECHNIQUE: CT scan of the abdomen and pelvis was performed following injection of IV contrast. Multiplanar reformats were obtained. Dose reduction techniques were used.  CONTRAST: 91mL Isovue 370   FINDINGS:   LOWER CHEST: Normal.   HEPATOBILIARY: Cirrhosis with changes of portal venous hypertension with recanalized umbilical vein and multiple collateral vessels and moderate ascites. No liver masses.   PANCREAS: Normal.   SPLEEN: 12.0 cm   ADRENAL GLANDS: Normal.   KIDNEYS/BLADDER: Tiny 2 mm nonobstructing stone right kidney unchanged.     BOWEL: Normal.   LYMPH NODES: Normal.   VASCULATURE: Normal.   PELVIC ORGANS: Tiny uterine fibroid.   MUSCULOSKELETAL: No concerning bone lesion  .  IMPRESSION:   1.  Cirrhosis with recanalized umbilical vein and moderate ascites similar to previous.    IMPRESSION AND PLAN  50 yo woman with Etoh history and evidence of cirrhosis on CT with a history of ascites  Admitted with 2 days of n/v and abd pain and back pain  Found to have an extremely high lactate level and hypotension although not to the point of needing pressors. Ascites cellularity but lymphocytes so not c/w sbp  New fever this am    Hypotension/ sepsis syndrome with very high lactate  Fever  Cirrhosis with ascites, not meeing criteria for SBP  Rule out infection  Back pain    She was admitted with hypotension and now has fever so infection is certainly a concern although nothing has been identified as a definite infection to this point  I note she was admitted to the ICU for hypotension  and lactic acidosis but has not had any blood cultures (???) or UA/UC  Her ascites has a lymphocyte predominance and may not be infected  Her back pain is longer standing but, in the absence of another finding, repeat MRI of the LS spine may be indicated    Recommendations  BC x 2 now  UA/UC now  I'll broaden her coverage in the short term with cefepime and vanco (stopping the ceftriaxone)  MRI of the L spine when more stable or asap if any loss of LE function    Thank you very much for this consultation      Jair Patterson MD

## 2025-03-23 NOTE — PROGRESS NOTES
"[facility]    Hospitalist Progress Note    Interval History   A&Ox3, patient reports improvement in abdominal pain. She has had a URI for several days prior to admission, only a mild cough currently. Denies fevers at home, reports her primary complaint prior to discharge was acute on chronic lower back pain, moderate abdominal pain, and dizziness. Still dizzy today  - Significantly oliguric and remains hypotensive--more IVF given  - Reports drinking 4 drinks a day prior to admission    Assessment & Plan   Summary: Pauline Merino is a 49 year old female with PMH DM2, diabetic ketoacidosis, severe alcohol use disorder, alcoholic cirrhosis with portal hypertension and ascites, tobacco use disorder, who was admitted on 3/22/2025 with severe sepsis.    Severe sepsis versus septic shock  Severe lactic acidosis, improving  Presents with 2-day history of worsening abdominal pain with nausea and vomiting in addition to mild confusion.  CT abdomen shows  \"cirrhosis with recanalized umbilical vein and moderate ascites similar to previous. No significant new findings.\" On admission labs are pertinent for a lactic acid greater than 19, mildly elevated LFTs along with a ammonia of 237. Paracentesis was performed which shows lymphocytic predominance not consistent with SBP. Noted fever, leukocytosis, hypotension on admission all consistent with severe sepsis vs septic shock.  Source is unclear. Suspect her concurrent decompensated cirrhosis has worsened the clinical assessment of her sepsis. Consider bacteremia, UTI, as etiology to sepsis.  - Appreciate ID consult   - Vanc and Cefepime  - IVF as below  - Blood cultures x2 collected late, pending  - Continue to check lactates q6h until resolved  - Continue ICU level cares until blood pressures and dizziness improved (note BP 85/45 this AM, patient reports ongoing dizziness)    Oliguric acute renal failure  Baseline Cr 0.5, creatinine 0.86 on " admission worsening to 1.58 on 3/23. Patient oliguric since admission. Appears to be volume depleted in setting of severe sepsis. Ddx includes concern for hepatorenal syndrome given her liver failure.  - NS bolus 1L repeat  - Continue with 1/2NS+Kcl @ 125ml/hr  - Monitor I&Os  - Given Albumin 25g on admisison, repeat Albumin 50g today (total 1g/kg) and tomorrow    Decompensated alcoholic cirrhosis  Alcoholic hepatitis  Ascites due to above, s/p paracentesis 3/22/2025  Note elevated bilirubin 4.2, , ALT 28 on admission, patient reports drinking 4 shots a day PTA. LFTs subsequently have worsened, , ALT 72, bilirubin 5.1, this likely reflects worsening liver failure in setting of sepsis above, another possibility is alcoholic hepatitis. Unclear if patient is minimizing her alcohol use.  - CIWA, thiamine, folate  - Avoid gabapentin and clonidine due to sepsis above  - [facility] consult    Acute hepatic encephalopathy  Ammonia 237 and noted to have confusion on admission. She is A&Ox3 on 3/23.  - Continue lactulose titrated to 3 stools a day    Thrombocytopenia: Likely combination of sepsis and liver disease.  - Monitor  - Consider transfusion if bleeding or dropping below 20k    DM2: A1c 5.3%  - mdssi    HTN: Hold PTA lasix, aldactone    Clinically Significant Risk Factors Present on Admission        # Hypokalemia: Lowest K = 3.1 mmol/L in last 2 days, will replace as needed  # Hyponatremia: Lowest Na = 128 mmol/L in last 2 days, will monitor as appropriate  # Hypochloremia: Lowest Cl = 78 mmol/L in last 2 days, will monitor as appropriate  # Hypocalcemia: Lowest Ca = 8.3 mg/dL in last 2 days, will monitor and replace as appropriate   # Hypomagnesemia: Lowest Mg = 1.2 mg/dL in last 2 days, will replace as needed  # Anion Gap Metabolic Acidosis: Highest Anion Gap = 49 mmol/L in last 2 days, will monitor and treat as appropriate   # Coagulation Defect: INR = 2.32 (Ref range: 0.85 - 1.15) and/or PTT = N/A, will  monitor for bleeding  # Thrombocytopenia: Lowest platelets = 59 in last 2 days, will monitor for bleeding  # Acute Kidney Injury, unspecified: based on a >150% or 0.3 mg/dL increase in last creatinine compared to past 90 day average, will monitor renal function  # Hypertension: Noted on problem list      # Anemia: based on hgb <11                   PT/OT: ordered  Diet: Regular Diet Adult    DVT Prophylaxis: Pneumatic Compression Devices  Flores Catheter: Not present  Lines: PRESENT      PICC 03/22/25 Triple Lumen Right Basilic Access-Site Assessment: WDL except      Cardiac Monitoring: ACTIVE order. Indication: ICU  Code Status: Full Code    Medically Ready for Discharge: Anticipated in 2-4 Days      Ruben Forde MD  Hospitalist Service  [facility]  Securely message with Vocera (more info)  Text page via [employer] Paging/Directory     I spent 30 minutes of critical care time on the unit/floor managing the care of Pauline Merino. Upon evaluation, this patient had a high probability of imminent or life-threatening deterioration due to acute illness, which required my direct attention, intervention, and personal management. 50% of my time was spent at the bedside counseling the patient and/or coordinating care regarding services listed in this note.    Data reviewed today: I reviewed all new labs and imaging results over the last 24 hours.    Physical Exam   Temp: 100.5  F (38.1  C) Temp src: Oral BP: 122/60 Pulse: 107   Resp: 16 SpO2: 93 % O2 Device: None (Room air)    Vitals:    03/23/25 0100   Weight: 70.5 kg (155 lb 6.8 oz)     Vital Signs with Ranges  Temp:  [97  F (36.1  C)-101  F (38.3  C)] 100.5  F (38.1  C)  Pulse:  [] 107  Resp:  [8-24] 16  BP: ()/(44-89) 122/60  SpO2:  [87 %-100 %] 93 %  I/O last 3 completed shifts:  In: 3150.83 [P.O.:120; I.V.:2030.83; IV Piggyback:1000]  Out: 1200 [Urine:1200]  O2 requirements: none    Constitutional: Female appears tired,  ill  HEENT: Eyes nonicteric, dry oral mucosa  Cardiovascular: RRR, normal S1/2, no m/r/g  Respiratory: CTAB, no wheezing or crackles  Vascular: No LE pitting edema  GI: Normoactive bowel sounds, nontender  Skin/Integumen: No rashes  Neuro/Psych: Appropriate affect and mood. A&Ox3, moves all extremities    Medications   Current Facility-Administered Medications   Medication Dose Route Frequency Provider Last Rate Last Admin    0.9% sodium chloride + KCl 20 mEq/L infusion   Intravenous Continuous Ruben Forde MD         Current Facility-Administered Medications   Medication Dose Route Frequency Provider Last Rate Last Admin    albumin human 25 % injection 50 g  50 g Intravenous Daily Ruben Forde MD   50 g at 03/23/25 1053    ceFEPIme (MAXIPIME) 2 g vial to attach to  mL bag for ADULTS or NS 50 mL bag for PEDS  2 g Intravenous Q12H Law Moore MD   2 g at 03/23/25 1011    folic acid (FOLVITE) tablet 1 mg  1 mg Oral Daily Ruben Forde MD        gabapentin (NEURONTIN) capsule 100 mg  100 mg Oral or NG Tube TID Law Moore MD   100 mg at 03/23/25 0829    lactulose (CHRONULAC) solution 10 g  10 g Oral TID Grace Owusu APRN CNP        multivitamin w/minerals (THERA-VIT-M) tablet 1 tablet  1 tablet Oral Daily Ruben Forde MD        sodium chloride (PF) 0.9% PF flush 10-40 mL  10-40 mL Intracatheter Q7 Days Law Moore MD   10 mL at 03/22/25 2151    sodium chloride (PF) 0.9% PF flush 10-40 mL  10-40 mL Intracatheter Daily Law Moore MD   10 mL at 03/22/25 2148    sodium chloride 0.9% BOLUS 1,000 mL  1,000 mL Intravenous Once Ruben Forde MD        sodium phosphate 9 mmol in 250 mL NS intermittent infusion  9 mmol Intravenous Once Law Moore MD   9 mmol at 03/23/25 0830    thiamine (B-1) tablet 100 mg  100 mg Oral Daily Ruben Forde MD        vancomycin (VANCOCIN) 1,250 mg in 0.9% NaCl 262.5 mL intermittent infusion  1,250 mg Intravenous Q24H Law Moore,  MD   1,250 mg at 03/23/25 1042       Data   Recent Labs   Lab 03/23/25  0625 03/23/25  0619 03/23/25  0014 03/22/25  1358 03/22/25  1318   WBC  --  6.8 5.9  --  15.1*   HGB  --  7.9* 7.6*  --  9.7*   MCV  --  90 92  --  102*   PLT  --  59* 61*  --  203   INR  --  2.32*  --   --  2.28*   NA  --  129* 128*  --  134*   POTASSIUM  --  3.6 3.1*  --  3.1*   CHLORIDE  --  84* 78*  --  82*   CO2  --  27 19*  --  3*   BUN  --  23.8* 20.5*  --  13.8   CR  --  1.58* 1.31*  --  0.86   ANIONGAP  --  18* 31*  --  49*   ALEX  --  8.3* 8.6*  --  9.6   * 169* 171*   < > 63*   ALBUMIN  --  3.5  --   --  4.3   PROTTOTAL  --  7.9  --   --  10.3*   BILITOTAL  --  5.1*  --   --  4.2*   ALKPHOS  --  101  --   --  181*   ALT  --  72*  --   --  28   AST  --  431*  --   --  104*   LIPASE  --   --   --   --  31    < > = values in this interval not displayed.       Imaging:   Recent Results (from the past 24 hours)   CT Abdomen Pelvis w Contrast    Narrative    EXAM: CT ABDOMEN PELVIS W CONTRAST  LOCATION: [facility]  DATE: 3/22/2025    INDICATION: Distention, vomiting, h o ETOH  COMPARISON: 11/8/2024 CT  TECHNIQUE: CT scan of the abdomen and pelvis was performed following injection of IV contrast. Multiplanar reformats were obtained. Dose reduction techniques were used.  CONTRAST: 91mL Isovue 370    FINDINGS:   LOWER CHEST: Normal.    HEPATOBILIARY: Cirrhosis with changes of portal venous hypertension with recanalized umbilical vein and multiple collateral vessels and moderate ascites. No liver masses.    PANCREAS: Normal.    SPLEEN: 12.0 cm    ADRENAL GLANDS: Normal.    KIDNEYS/BLADDER: Tiny 2 mm nonobstructing stone right kidney unchanged.    BOWEL: Normal.    LYMPH NODES: Normal.    VASCULATURE: Normal.    PELVIC ORGANS: Tiny uterine fibroid.    MUSCULOSKELETAL: No concerning bone lesion.      Impression    IMPRESSION:   1.  Cirrhosis with recanalized umbilical vein and moderate ascites similar to  previous.    2.  No significant new findings.   US Paracentesis without Albumin    Narrative    EXAM:  1. PARACENTESIS  2. ULTRASOUND GUIDANCE  LOCATION: [facility]  DATE: 3/22/2025    INDICATION: Ascites. Alcoholic cirrhosis.    PROCEDURE: Informed consent obtained. Time out performed. The abdomen was prepped and draped in a sterile fashion. 10 mL of 1% lidocaine was infused into local soft tissues. A 5 Serbian catheter system was introduced into the abdominal ascites under   ultrasound guidance.    3 liters of bright yellow fluid were removed and sent to lab if requested.    Patient tolerated procedure well.    Ultrasound imaging was obtained and placed in the patient's permanent medical record.      Impression    IMPRESSION:  1.  Status post ultrasound-guided paracentesis.     XR Chest Port 1 View    Narrative    EXAM: XR CHEST PORT 1 VIEW  LOCATION: [facility]  DATE: 3/22/2025    INDICATION: PICC line placement verification  COMPARISON: None.      Impression    IMPRESSION: Right PICC tip at the caval atrial junction. The visualized lung fields are clear. The cardiac silhouette and pulmonary vasculature are normal.

## 2025-03-24 LAB
% LINING CELLS, BODY FLUID: 2 %
ALBUMIN SERPL BCG-MCNC: 3.7 G/DL (ref 3.5–5.2)
ALBUMIN UR-MCNC: NEGATIVE MG/DL
ALP SERPL-CCNC: 108 U/L (ref 40–150)
ALT SERPL W P-5'-P-CCNC: 78 U/L (ref 0–50)
ANION GAP SERPL CALCULATED.3IONS-SCNC: 16 MMOL/L (ref 7–15)
APPEARANCE UR: CLEAR
AST SERPL W P-5'-P-CCNC: 299 U/L (ref 0–45)
ATRIAL RATE - MUSE: 103 BPM
BILIRUB SERPL-MCNC: 3.6 MG/DL
BILIRUB UR QL STRIP: NEGATIVE
BUN SERPL-MCNC: 18.9 MG/DL (ref 6–20)
CALCIUM SERPL-MCNC: 8.4 MG/DL (ref 8.8–10.4)
CHLORIDE SERPL-SCNC: 97 MMOL/L (ref 98–107)
COLOR UR AUTO: YELLOW
CREAT SERPL-MCNC: 0.95 MG/DL (ref 0.51–0.95)
DIASTOLIC BLOOD PRESSURE - MUSE: NORMAL MMHG
EGFRCR SERPLBLD CKD-EPI 2021: 74 ML/MIN/1.73M2
ERYTHROCYTE [DISTWIDTH] IN BLOOD BY AUTOMATED COUNT: 18.8 % (ref 10–15)
GLUCOSE BLDC GLUCOMTR-MCNC: 126 MG/DL (ref 70–99)
GLUCOSE SERPL-MCNC: 134 MG/DL (ref 70–99)
GLUCOSE UR STRIP-MCNC: NEGATIVE MG/DL
HCO3 SERPL-SCNC: 23 MMOL/L (ref 22–29)
HCT VFR BLD AUTO: 24.3 % (ref 35–47)
HGB BLD-MCNC: 7.7 G/DL (ref 11.7–15.7)
HGB UR QL STRIP: NEGATIVE
INTERPRETATION ECG - MUSE: NORMAL
KETONES UR STRIP-MCNC: ABNORMAL MG/DL
LEUKOCYTE ESTERASE UR QL STRIP: NEGATIVE
LYMPHOCYTES NFR FLD MANUAL: 71 %
MCH RBC QN AUTO: 29.2 PG (ref 26.5–33)
MCHC RBC AUTO-ENTMCNC: 31.7 G/DL (ref 31.5–36.5)
MCV RBC AUTO: 92 FL (ref 78–100)
MONOS+MACROS NFR FLD MANUAL: 20 %
NEUTROPHILS # FLD: 48.3 /UL
NEUTS BAND NFR FLD MANUAL: 7 %
NITRATE UR QL: NEGATIVE
P AXIS - MUSE: 73 DEGREES
PATH REV: NORMAL
PH UR STRIP: 6.5 [PH] (ref 5–7)
PHOSPHATE SERPL-MCNC: 1.1 MG/DL (ref 2.5–4.5)
PHOSPHATE SERPL-MCNC: 1.4 MG/DL (ref 2.5–4.5)
PLATELET # BLD AUTO: 51 10E3/UL (ref 150–450)
POTASSIUM SERPL-SCNC: 3.3 MMOL/L (ref 3.4–5.3)
POTASSIUM SERPL-SCNC: 3.3 MMOL/L (ref 3.4–5.3)
POTASSIUM SERPL-SCNC: 3.6 MMOL/L (ref 3.4–5.3)
PR INTERVAL - MUSE: 172 MS
PROT SERPL-MCNC: 7.7 G/DL (ref 6.4–8.3)
QRS DURATION - MUSE: 82 MS
QT - MUSE: 500 MS
QTC - MUSE: 655 MS
R AXIS - MUSE: 93 DEGREES
RBC # BLD AUTO: 2.64 10E6/UL (ref 3.8–5.2)
RBC URINE: 1 /HPF
SODIUM SERPL-SCNC: 136 MMOL/L (ref 135–145)
SP GR UR STRIP: 1.01 (ref 1–1.03)
SQUAMOUS EPITHELIAL: 1 /HPF
SYSTOLIC BLOOD PRESSURE - MUSE: NORMAL MMHG
T AXIS - MUSE: 69 DEGREES
UROBILINOGEN UR STRIP-MCNC: NORMAL MG/DL
VENTRICULAR RATE- MUSE: 103 BPM
WBC # BLD AUTO: 7.5 10E3/UL (ref 4–11)
WBC URINE: 2 /HPF

## 2025-03-24 PROCEDURE — 80053 COMPREHEN METABOLIC PANEL: CPT | Performed by: INTERNAL MEDICINE

## 2025-03-24 PROCEDURE — 81001 URINALYSIS AUTO W/SCOPE: CPT | Performed by: INTERNAL MEDICINE

## 2025-03-24 PROCEDURE — 84100 ASSAY OF PHOSPHORUS: CPT | Performed by: STUDENT IN AN ORGANIZED HEALTH CARE EDUCATION/TRAINING PROGRAM

## 2025-03-24 PROCEDURE — 250N000009 HC RX 250: Performed by: STUDENT IN AN ORGANIZED HEALTH CARE EDUCATION/TRAINING PROGRAM

## 2025-03-24 PROCEDURE — 250N000011 HC RX IP 250 OP 636: Performed by: INTERNAL MEDICINE

## 2025-03-24 PROCEDURE — 99233 SBSQ HOSP IP/OBS HIGH 50: CPT | Performed by: INTERNAL MEDICINE

## 2025-03-24 PROCEDURE — 84100 ASSAY OF PHOSPHORUS: CPT | Performed by: INTERNAL MEDICINE

## 2025-03-24 PROCEDURE — 99233 SBSQ HOSP IP/OBS HIGH 50: CPT | Performed by: SPECIALIST

## 2025-03-24 PROCEDURE — 80307 DRUG TEST PRSMV CHEM ANLYZR: CPT | Performed by: STUDENT IN AN ORGANIZED HEALTH CARE EDUCATION/TRAINING PROGRAM

## 2025-03-24 PROCEDURE — 84132 ASSAY OF SERUM POTASSIUM: CPT | Performed by: INTERNAL MEDICINE

## 2025-03-24 PROCEDURE — 250N000013 HC RX MED GY IP 250 OP 250 PS 637: Performed by: INTERNAL MEDICINE

## 2025-03-24 PROCEDURE — 250N000011 HC RX IP 250 OP 636: Performed by: STUDENT IN AN ORGANIZED HEALTH CARE EDUCATION/TRAINING PROGRAM

## 2025-03-24 PROCEDURE — 120N000001 HC R&B MED SURG/OB

## 2025-03-24 PROCEDURE — 250N000013 HC RX MED GY IP 250 OP 250 PS 637: Performed by: STUDENT IN AN ORGANIZED HEALTH CARE EDUCATION/TRAINING PROGRAM

## 2025-03-24 PROCEDURE — 258N000003 HC RX IP 258 OP 636: Performed by: STUDENT IN AN ORGANIZED HEALTH CARE EDUCATION/TRAINING PROGRAM

## 2025-03-24 PROCEDURE — P9047 ALBUMIN (HUMAN), 25%, 50ML: HCPCS | Mod: JZ | Performed by: INTERNAL MEDICINE

## 2025-03-24 PROCEDURE — 99254 IP/OBS CNSLTJ NEW/EST MOD 60: CPT | Performed by: PSYCHIATRY & NEUROLOGY

## 2025-03-24 PROCEDURE — 85027 COMPLETE CBC AUTOMATED: CPT | Performed by: INTERNAL MEDICINE

## 2025-03-24 RX ORDER — CEFEPIME HYDROCHLORIDE 2 G/1
2 INJECTION, POWDER, FOR SOLUTION INTRAVENOUS EVERY 8 HOURS
Status: DISCONTINUED | OUTPATIENT
Start: 2025-03-24 | End: 2025-03-24

## 2025-03-24 RX ORDER — VANCOMYCIN HYDROCHLORIDE 1 G/200ML
1000 INJECTION, SOLUTION INTRAVENOUS EVERY 12 HOURS
Status: DISCONTINUED | OUTPATIENT
Start: 2025-03-24 | End: 2025-03-24

## 2025-03-24 RX ORDER — POTASSIUM CHLORIDE 1500 MG/1
40 TABLET, EXTENDED RELEASE ORAL ONCE
Status: COMPLETED | OUTPATIENT
Start: 2025-03-24 | End: 2025-03-24

## 2025-03-24 RX ADMIN — SODIUM PHOSPHATE, MONOBASIC, MONOHYDRATE AND SODIUM PHOSPHATE, DIBASIC, ANHYDROUS 15 MMOL: 142; 276 INJECTION, SOLUTION INTRAVENOUS at 22:44

## 2025-03-24 RX ADMIN — THIAMINE HCL TAB 100 MG 100 MG: 100 TAB at 09:33

## 2025-03-24 RX ADMIN — POTASSIUM & SODIUM PHOSPHATES POWDER PACK 280-160-250 MG 2 PACKET: 280-160-250 PACK at 07:48

## 2025-03-24 RX ADMIN — CEFEPIME 2 G: 2 INJECTION, POWDER, FOR SOLUTION INTRAVENOUS at 09:36

## 2025-03-24 RX ADMIN — ALBUMIN HUMAN 50 G: 0.25 SOLUTION INTRAVENOUS at 09:41

## 2025-03-24 RX ADMIN — OXYCODONE HYDROCHLORIDE 10 MG: 5 TABLET ORAL at 07:42

## 2025-03-24 RX ADMIN — GABAPENTIN 100 MG: 100 CAPSULE ORAL at 22:11

## 2025-03-24 RX ADMIN — POTASSIUM & SODIUM PHOSPHATES POWDER PACK 280-160-250 MG 2 PACKET: 280-160-250 PACK at 11:15

## 2025-03-24 RX ADMIN — OXYCODONE HYDROCHLORIDE 5 MG: 5 TABLET ORAL at 20:52

## 2025-03-24 RX ADMIN — POTASSIUM & SODIUM PHOSPHATES POWDER PACK 280-160-250 MG 2 PACKET: 280-160-250 PACK at 15:20

## 2025-03-24 RX ADMIN — GABAPENTIN 100 MG: 100 CAPSULE ORAL at 09:33

## 2025-03-24 RX ADMIN — LACTULOSE 10 G: 20 SOLUTION ORAL at 09:33

## 2025-03-24 RX ADMIN — Medication 1 TABLET: at 09:33

## 2025-03-24 RX ADMIN — OXYCODONE HYDROCHLORIDE 10 MG: 5 TABLET ORAL at 02:07

## 2025-03-24 RX ADMIN — POTASSIUM CHLORIDE AND SODIUM CHLORIDE: 900; 150 INJECTION, SOLUTION INTRAVENOUS at 19:09

## 2025-03-24 RX ADMIN — GABAPENTIN 100 MG: 100 CAPSULE ORAL at 15:20

## 2025-03-24 RX ADMIN — VANCOMYCIN HYDROCHLORIDE 1000 MG: 1 INJECTION, SOLUTION INTRAVENOUS at 10:36

## 2025-03-24 RX ADMIN — POTASSIUM CHLORIDE AND SODIUM CHLORIDE: 900; 150 INJECTION, SOLUTION INTRAVENOUS at 06:36

## 2025-03-24 RX ADMIN — FOLIC ACID 1 MG: 1 TABLET ORAL at 09:33

## 2025-03-24 RX ADMIN — POTASSIUM CHLORIDE 40 MEQ: 1500 TABLET, EXTENDED RELEASE ORAL at 07:48

## 2025-03-24 ASSESSMENT — ACTIVITIES OF DAILY LIVING (ADL)
ADLS_ACUITY_SCORE: 61
ADLS_ACUITY_SCORE: 64
ADLS_ACUITY_SCORE: 61
ADLS_ACUITY_SCORE: 37
ADLS_ACUITY_SCORE: 61
ADLS_ACUITY_SCORE: 37
ADLS_ACUITY_SCORE: 60
ADLS_ACUITY_SCORE: 61
ADLS_ACUITY_SCORE: 64
ADLS_ACUITY_SCORE: 37
ADLS_ACUITY_SCORE: 61
DEPENDENT_IADLS:: INDEPENDENT
ADLS_ACUITY_SCORE: 64
ADLS_ACUITY_SCORE: 61
ADLS_ACUITY_SCORE: 60
ADLS_ACUITY_SCORE: 64
ADLS_ACUITY_SCORE: 37
ADLS_ACUITY_SCORE: 38
ADLS_ACUITY_SCORE: 64
ADLS_ACUITY_SCORE: 60

## 2025-03-24 NOTE — PROGRESS NOTES
[facility]  [facility]  Gastroenterology Progress note    Interval History:      Patient reports feeling much better.  AAO x 3.  She is interested in sobriety.  No complaints.      Vital Signs:      /64   Pulse 95   Temp 99.7  F (37.6  C) (Oral)   Resp 17   Wt 75.5 kg (166 lb 7.2 oz)   SpO2 98%   BMI 26.87 kg/m    Temp (24hrs), Av  F (37.2  C), Min:98.2  F (36.8  C), Max:100.5  F (38.1  C)    Patient Vitals for the past 72 hrs:   Weight   25 0531 75.5 kg (166 lb 7.2 oz)   25 0100 70.5 kg (155 lb 6.8 oz)       Intake/Output Summary (Last 24 hours) at 3/24/2025 1022  Last data filed at 3/24/2025 0600  Gross per 24 hour   Intake 3919.58 ml   Output --   Net 3919.58 ml         Constitutional: NAD, comfortable  Cardiovascular: RRR, normal S1, S2   Respiratory: CTAB  Abdomen: soft, non-tender, nondistended    Additional Comments:  ROS, FH, SH: See initial GI consult for details.    Laboratory Data:  Recent Labs   Lab Test 25  0954 25  0619 25  0014 25  1318 11/10/24  1134 24  0736   WBC 7.5 6.8 5.9 15.1*   < > 6.2   HGB 7.7* 7.9* 7.6* 9.7*   < > 8.9*   MCV 92 90 92 102*   < > 97   PLT 51* 59* 61* 203   < > 133*   INR  --  2.32*  --  2.28*  --  1.48*    < > = values in this interval not displayed.     Recent Labs   Lab Test 25  0526 25  0619 25  0014    129* 128*   POTASSIUM 3.3*  3.3* 3.6 3.1*   CHLORIDE 97* 84* 78*   CO2 23 27 19*   BUN 18.9 23.8* 20.5*   CR 0.95 1.58* 1.31*   ANIONGAP 16* 18* 31*   ALEX 8.4* 8.3* 8.6*     Recent Labs   Lab Test 25  0526 25  0619 25  1318 24  0736 24  1810 24  1735   ALBUMIN 3.7 3.5 4.3   < >  --  3.8  3.8   BILITOTAL 3.6* 5.1* 4.2*   < >  --  2.1*   ALT 78* 72* 28   < >  --  34   * 431* 104*   < >  --  223*   ALKPHOS 108 101 181*   < >  --  247*   PROTEIN  --   --   --   --  20*  --    LIPASE  --   --  31  --   --  25    < > =  values in this interval not displayed.         Assessment:  50 yo female with decompensated cirrhosis of liver secondary to alcohol use.  Patient is actively drinking.  MELD 31.  Ascitic fluid negative for SBP.  Liver function tests elevated in pattern consistent with alcoholic hepatitis.    Imaging in 11/2024 negative for gallstones or gallbladder wall thickening.  CT A/P 3/22 with cirrhosis with recanalized umbilical vein and moderate ascites similar to previous.    Hemoglobin 7.7.  No signs of overt GI bleeding.  Anemia most likely due to bone marrow suppression from alcohol use.  No previous EGD.    Acute hepatic encephalopathy improved.  Lactulose stopped.  Plan:  -  Appreciate ID input.  They are following to rule out causes of infection.  Empiric antibiotics started.  -  Trend MELD labs.  -  Paracentesis prn.  -  Low sodium diet as tolerated.  -  Complete alcohol cessation.  -  Not a candidate for liver transplant with active drinking.  -  Monitor for withdrawal symptoms.  -  Outpatient follow up in [facility] liver clinic on discharge.  [facility] will call to arrange.  -  Outpatient EGD to assess for varices.  Can consider as inpatient if she develops signs of GI bleed.    Total Time Spent: 11 minutes    RADHA Edward  [facility]  Office:  [phone] call if needed after 5PM  Cell:  [phone], not available after 5PM at this number

## 2025-03-24 NOTE — PLAN OF CARE
Goal Outcome Evaluation:  Plan of Care Reviewed With: patient  Overall Patient Progress: improving  Outcome Evaluation: Patient alert and oriented x4; forgetful. VSS on 2L oxygen when sleeping. She reported mild dizziness with change in position. Back pain managed with PRN Oxycodone helpful. CIWA 0. She is up to the bathroom with SBA. She had 2 loose stools.    Problem: Adult Inpatient Plan of Care  Goal: Optimal Comfort and Wellbeing  Intervention: Provide Person-Centered Care  Recent Flowsheet Documentation  Taken 3/24/2025 0400 by Jaymie Turner RN  Trust Relationship/Rapport:   care explained   choices provided   reassurance provided   thoughts/feelings acknowledged  Taken 3/24/2025 0000 by Jaymie Turner RN  Trust Relationship/Rapport:   care explained   choices provided   reassurance provided   thoughts/feelings acknowledged  Taken 3/23/2025 2000 by Jaymie Turner RN  Trust Relationship/Rapport:   care explained   choices provided   reassurance provided   thoughts/feelings acknowledged     Problem: Pain Acute  Goal: Optimal Pain Control and Function  Intervention: Develop Pain Management Plan  Recent Flowsheet Documentation  Taken 3/24/2025 0000 by Jaymie Turner RN  Pain Management Interventions:   care clustered   pillow support provided   rest   relaxation techniques promoted   quiet environment facilitated  Taken 3/23/2025 2112 by Jaymie Turner RN  Pain Management Interventions: medication (see MAR)  Taken 3/23/2025 2000 by Jaymie Turner RN  Pain Management Interventions: rest  Intervention: Prevent or Manage Pain  Recent Flowsheet Documentation  Taken 3/24/2025 0400 by Jaymie Turner RN  Medication Review/Management: medications reviewed  Taken 3/24/2025 0000 by Jaymie Turner RN  Medication Review/Management: medications reviewed

## 2025-03-24 NOTE — PLAN OF CARE
Goal Outcome Evaluation:      Plan of Care Reviewed With: patient    Overall Patient Progress: improvingOverall Patient Progress: improving    Outcome Evaluation: Patient was cleared to transfer from ICU to this floor; not scoring CIWA    Summary: ETOH Withdrawal; Transfer from ICU  DATE & TIME: 1400-1930 03/24/2025    Cognitive Concerns/ Orientation : A&Ox4   BEHAVIOR & AGGRESSION TOOL COLOR: Green  CIWA SCORE: 0   ABNL VS/O2: VSS RA  MOBILITY: SBA  PAIN MANAGMENT: Scheduled Gabapentin  DIET: Regular  BOWEL/BLADDER: Continent  ABNL LAB/BG: K+ 3.3 (replaced prior to transfer; redraw tonight), Phos 1.4 (replaced; redraw tonight), ALT 78; ; Bilirubin 3.6  DRAIN/DEVICES: Dual lumen PICC infusing NS KCl 20mEq at 50 ml/hr  TELEMETRY RHYTHM: Sinus Rhythm  SKIN: Distended abdomen; random scratches  TESTS/PROCEDURES: None today  D/C DAY/GOALS/PLACE: TBD  OTHER IMPORTANT INFO: Transfer from ICU this afternoon

## 2025-03-24 NOTE — PROGRESS NOTES
"[facility]    Hospitalist Progress Note    Interval History   - A&Ox3, much improved demeanor and mood today. Reports chronic back pain  - States that her ex-  two years ago and this was the trigger for her to increase her drinking. She also has a father who she is close with who had open heart surgery 3 months ago and that has been stressful. Finally she is unemployed. She is interested in quitting alcohol use.    Assessment & Plan   Summary: Pauline Merino is a 49 year old female with PMH DM2, diabetic ketoacidosis, severe alcohol use disorder, alcoholic cirrhosis with portal hypertension and ascites, tobacco use disorder, who was admitted on 3/22/2025 with severe sepsis versus septic shock.    Severe sepsis versus septic shock, improved  Severe lactic acidosis, improved  Presents with 2-day history of worsening abdominal pain with nausea and vomiting in addition to mild confusion.  CT abdomen shows  \"cirrhosis with recanalized umbilical vein and moderate ascites similar to previous. No significant new findings.\" On admission labs are pertinent for a lactic acid greater than 19, mildly elevated LFTs along with a ammonia of 237. Paracentesis was performed which shows lymphocytic predominance not consistent with SBP. Noted fever, leukocytosis, hypotension on admission all consistent with severe sepsis vs septic shock.  Source is unclear. Suspect her concurrent decompensated cirrhosis has worsened the clinical assessment of her sepsis. Consider bacteremia, UTI, as etiology to sepsis.  - Appreciate ID consult   - Vanc and Cefepime  - IVF as below  - Blood cultures x2 collected late, NGTD  - Okay to transfer out of ICU    Oliguric acute renal failure  Baseline Cr 0.5, creatinine 0.86 on admission worsening to 1.58 on 3/23. Patient oliguric since admission. Appears to be volume depleted in setting of severe sepsis. Ddx includes concern for hepatorenal syndrome given her liver " failure.   Creatinine improved to 0.95 on 3/24.  - Continue with 1/2NS+Kcl @ 50ml/hr  - Monitor I&Os  - Stop albumin    Decompensated alcoholic cirrhosis  Alcoholic hepatitis  Ascites due to above, s/p paracentesis 3/22/2025  Note elevated bilirubin 4.2, , ALT 28 on admission, patient reports drinking 4 shots a day PTA. LFTs subsequently have worsened, , ALT 72, bilirubin 5.1, this likely reflects worsening liver failure in setting of sepsis above, another possibility is alcoholic hepatitis.   Note improving LFTs on 3/24.   - CIWA, thiamine, folate--no evidence of withdrawal so far  - Okay to continue low dose gabapentin  - [facility] consult appreciated  - Chem dep and Psychiatry consulted 3/24    Acute hepatic encephalopathy, improved  Ammonia 237 and noted to have confusion on admission. She is A&Ox3 on 3/23, 3/24. Likely combination of infection and liver decompensation causing elevated ammonia.  - Stop lactulose and monitor    Thrombocytopenia: Likely combination of sepsis and liver disease.  - Monitor  - Consider transfusion if bleeding or dropping below 20k    Normocytic anemia: Hgb baseline around 9 since cirrhosis diagnosis in 11/2024, Hgb slightly lower here 7-8s in setting of sepsis.  - Continue to monitor    DM2: A1c 5.3%  - mdssi    HTN: Hold PTA lasix, aldactone    Clinically Significant Risk Factors        # Hypokalemia: Lowest K = 3.1 mmol/L in last 2 days, will replace as needed  # Hyponatremia: Lowest Na = 128 mmol/L in last 2 days, will monitor as appropriate  # Hypochloremia: Lowest Cl = 78 mmol/L in last 2 days, will monitor as appropriate  # Hypocalcemia: Lowest Ca = 8.3 mg/dL in last 2 days, will monitor and replace as appropriate   # Hypomagnesemia: Lowest Mg = 1.2 mg/dL in last 2 days, will replace as needed  # Anion Gap Metabolic Acidosis: Highest Anion Gap = 49 mmol/L in last 2 days, will monitor and treat as appropriate     # Coagulation Defect: INR = 2.32 (Ref range: 0.85 - 1.15)  and/or PTT = N/A, will monitor for bleeding  # Thrombocytopenia: Lowest platelets = 51 in last 2 days, will monitor for bleeding   # Hypertension: Noted on problem list                        PT/OT: ordered  Diet: Regular Diet Adult    DVT Prophylaxis: Pneumatic Compression Devices  Flores Catheter: Not present  Lines: PRESENT      PICC 03/22/25 Triple Lumen Right Basilic Access-Site Assessment: WDL except      Cardiac Monitoring: ACTIVE order. Indication: ICU  Code Status: Full Code    Medically Ready for Discharge: Anticipated in 2-4 Days      Ruben Forde MD  Hospitalist Service  [facility]  Securely message with [employer] (more info)  Text page via [employer] Paging/Directory     I spent 30 minutes of critical care time on the unit/floor managing the care of Pauline Merino. Upon evaluation, this patient had a high probability of imminent or life-threatening deterioration due to acute illness, which required my direct attention, intervention, and personal management. 50% of my time was spent at the bedside counseling the patient and/or coordinating care regarding services listed in this note.    Data reviewed today: I reviewed all new labs and imaging results over the last 24 hours.    Physical Exam   Temp: 99.7  F (37.6  C) Temp src: Oral BP: 115/64 Pulse: 95   Resp: 17 SpO2: 98 % O2 Device: Nasal cannula Oxygen Delivery: 2 LPM  Vitals:    03/23/25 0100 03/24/25 0531   Weight: 70.5 kg (155 lb 6.8 oz) 75.5 kg (166 lb 7.2 oz)     Vital Signs with Ranges  Temp:  [98.2  F (36.8  C)-100.5  F (38.1  C)] 99.7  F (37.6  C)  Pulse:  [] 95  Resp:  [7-28] 17  BP: ()/(45-94) 115/64  SpO2:  [88 %-98 %] 98 %  I/O last 3 completed shifts:  In: 4499.58 [P.O.:180; I.V.:2794.58; IV Piggyback:1525]  Out: -   O2 requirements: none    Constitutional: Female appears tired, ill  HEENT: Eyes nonicteric, dry oral mucosa  Cardiovascular: RRR, normal S1/2, no m/r/g  Respiratory: CTAB, no  wheezing or crackles  Vascular: No LE pitting edema  GI: Normoactive bowel sounds, nontender  Skin/Integumen: No rashes  Neuro/Psych: Appropriate affect and mood. A&Ox3, moves all extremities    Medications   Current Facility-Administered Medications   Medication Dose Route Frequency Provider Last Rate Last Admin    0.9% sodium chloride + KCl 20 mEq/L infusion   Intravenous Continuous Ruben Forde  mL/hr at 03/24/25 0636 New Bag at 03/24/25 0636     Current Facility-Administered Medications   Medication Dose Route Frequency Provider Last Rate Last Admin    ceFEPIme (MAXIPIME) 2 g vial to attach to  mL bag for ADULTS or NS 50 mL bag for PEDS  2 g Intravenous Q8H Law Moore MD        folic acid (FOLVITE) tablet 1 mg  1 mg Oral Daily Ruben Forde MD   1 mg at 03/24/25 0933    gabapentin (NEURONTIN) capsule 100 mg  100 mg Oral or NG Tube TID Law Moore MD   100 mg at 03/24/25 0933    multivitamin w/minerals (THERA-VIT-M) tablet 1 tablet  1 tablet Oral Daily Ruben Forde MD   1 tablet at 03/24/25 0933    potassium & sodium phosphates (NEUTRA-PHOS) Packet 2 packet  2 packet Oral or Feeding Tube Q4H Ruben Forde MD   2 packet at 03/24/25 0748    sodium chloride (PF) 0.9% PF flush 10-40 mL  10-40 mL Intracatheter Q7 Days Law Moore MD   10 mL at 03/22/25 2151    sodium chloride (PF) 0.9% PF flush 10-40 mL  10-40 mL Intracatheter Daily Law Moore MD   10 mL at 03/22/25 2148    thiamine (B-1) tablet 100 mg  100 mg Oral Daily Ruben Forde MD   100 mg at 03/24/25 0933    vancomycin (VANCOCIN) 1,000 mg in 200 mL dextrose intermittent infusion  1,000 mg Intravenous Q12H Law Moore MD           Data   Recent Labs   Lab 03/24/25  0954 03/24/25  0528 03/24/25  0526 03/23/25  1645 03/23/25  0625 03/23/25  0619 03/23/25  0014 03/22/25  1358 03/22/25  1318   WBC 7.5  --   --   --   --  6.8 5.9  --  15.1*   HGB 7.7*  --   --   --   --  7.9* 7.6*  --  9.7*   MCV 92  --    --   --   --  90 92  --  102*   PLT 51*  --   --   --   --  59* 61*  --  203   INR  --   --   --   --   --  2.32*  --   --  2.28*   NA  --   --  136  --   --  129* 128*  --  134*   POTASSIUM  --   --  3.3*  3.3*  --   --  3.6 3.1*  --  3.1*   CHLORIDE  --   --  97*  --   --  84* 78*  --  82*   CO2  --   --  23  --   --  27 19*  --  3*   BUN  --   --  18.9  --   --  23.8* 20.5*  --  13.8   CR  --   --  0.95  --   --  1.58* 1.31*  --  0.86   ANIONGAP  --   --  16*  --   --  18* 31*  --  49*   ALEX  --   --  8.4*  --   --  8.3* 8.6*  --  9.6   GLC  --  126* 134* 146*   < > 169* 171*   < > 63*   ALBUMIN  --   --  3.7  --   --  3.5  --   --  4.3   PROTTOTAL  --   --  7.7  --   --  7.9  --   --  10.3*   BILITOTAL  --   --  3.6*  --   --  5.1*  --   --  4.2*   ALKPHOS  --   --  108  --   --  101  --   --  181*   ALT  --   --  78*  --   --  72*  --   --  28   AST  --   --  299*  --   --  431*  --   --  104*   LIPASE  --   --   --   --   --   --   --   --  31    < > = values in this interval not displayed.       Imaging:   No results found for this or any previous visit (from the past 24 hours).

## 2025-03-24 NOTE — CONSULTS
Care Management Initial Consult    General Information  Assessment completed with: PatientPauline  Type of CM/SW Visit: Initial Assessment    Primary Care Provider verified and updated as needed: No (pt shared that her PCP left the [facility] clinic and she hasn't est care with a new PCP.)   Readmission within the last 30 days: no previous admission in last 30 days      Reason for Consult: other (see comments) (Elevated Risk Score)  Advance Care Planning:            Communication Assessment  Patient's communication style: spoken language (English or Bilingual)             Cognitive  Cognitive/Neuro/Behavioral: WDL  Level of Consciousness: alert  Arousal Level: opens eyes spontaneously  Orientation: oriented x 4  Mood/Behavior: calm, cooperative  Best Language: 0 - No aphasia  Speech: spontaneous, clear, logical    Living Environment:   People in home: significant other  Parviz  Current living Arrangements: house      Able to return to prior arrangements: yes       Family/Social Support:  Care provided by: self  Provides care for: no one  Marital Status: Lives with Significant Other  Support system: Parent(s), Significant Other       Parviz  Description of Support System: Supportive, Involved         Current Resources:   Patient receiving home care services: No        Community Resources: None  Equipment currently used at home: none  Supplies currently used at home: None    Employment/Financial:  Employment Status: unemployed        Financial Concerns: none   Referral to Financial Worker: No       Does the patient's insurance plan have a 3 day qualifying hospital stay waiver?  No    Lifestyle & Psychosocial Needs:  Social Drivers of Health     Food Insecurity: Low Risk  (11/8/2024)    Food Insecurity     Within the past 12 months, did you worry that your food would run out before you got money to buy more?: No     Within the past 12 months, did the food you bought just not last and you didn t have money to get  more?: No   Depression: Not at risk (9/21/2023)    [facility]    PHQ-2     PHQ-2 TOTAL SCORE: 2   Housing Stability: Low Risk  (11/8/2024)    Housing Stability     Do you have housing? : Yes     Are you worried about losing your housing?: No   Tobacco Use: High Risk (1/5/2025)    [facility]    Patient History     Smoking Tobacco Use: Every Day     Smokeless Tobacco Use: Never     Passive Exposure: Not on file   Financial Resource Strain: Low Risk  (11/8/2024)    Financial Resource Strain     Within the past 12 months, have you or your family members you live with been unable to get utilities (heat, electricity) when it was really needed?: No   Alcohol Use: Not on file   Transportation Needs: Low Risk  (11/8/2024)    Transportation Needs     Within the past 12 months, has lack of transportation kept you from medical appointments, getting your medicines, non-medical meetings or appointments, work, or from getting things that you need?: No   Physical Activity: Not on file   Interpersonal Safety: Low Risk  (11/8/2024)    Interpersonal Safety     Do you feel physically and emotionally safe where you currently live?: Yes     Within the past 12 months, have you been hit, slapped, kicked or otherwise physically hurt by someone?: No     Within the past 12 months, have you been humiliated or emotionally abused in other ways by your partner or ex-partner?: No   Stress: Not on file   Social Connections: Socially Integrated (12/26/2023)    [facility]    Social Connections     Do you often feel lonely or isolated from those around you?: 0   Health Literacy: Not on file       Functional Status:  Prior to admission patient needed assistance:   Dependent ADLs:: Independent  Dependent IADLs:: Independent       Mental Health Status:          Chemical Dependency Status:                 Values/Beliefs:  Spiritual, Cultural Beliefs, Mosque Practices, Values that affect care: no               Discussed  Partnership in Safe Discharge Planning  document with patient/family: No    Additional Information:  Per consult for Elevated Risk Score, met with patient to discuss discharge plan.  Patient shared that she lives in a split level home with her Finance Parviz.  Patient shared that she is independent with her ADLs & IADLs.  Patient shared she is currently unemployed and that her Fiance is supporting her financially.  Patient shared that she doesn't want any information provided to her parents regarding her ETOH use and that she would like to share this with them and didn't want to discuss this with [facility].  Discussed PCP and pt shared that the PCP at the [facility] that she was seeing left that clinic and she hasn't est a new PCP.  Offered at discharge to schedule pt with new PCP.  Per chart review, CD & Psych consulted to see patient.    Next Steps: Care Management will continue to follow for discharge planning.     AMPARO Mccarthy RN, BSN, OCN   Inpatient Care Coordination [facility]  Office: [phone]

## 2025-03-24 NOTE — PROGRESS NOTES
[facility]    Infectious Disease Progress Note    Date of Service (when I saw the patient): 03/24/2025     Assessment:  50 yo woman with Etoh history and evidence of cirrhosis on CT with a history of ascites, who has been admitted with a 2 day hx of of n/v, abd pain and back pain and was found to be hypotensive requiring ICU stay. Her symptoms have resolved, no bacterial infection has been documented thus far, likely a viral illness.     -Hypotension/ sepsis syndrome which has resolved, possibly viral etiology with negative work up for bacterial infection  -Fever has resolved  -Cirrhosis with ascites, not meeing criteria for SBP.   -thrombocytopenia, may be related to acute illness and liver disease  -Anemia   -Back pain, remains stable and chronic  -Elevated liver enzymes are improving    Recommendations:  Peritoneal fluid assessment is not consistent with SBP, blood cxs negative, CXR negative and UA is negative for infection as well  Back pain is chronic and remains at baseline but imaging can be considered if recurrent fever develops  Discontinue Cefepime and Vancomycin, and monitor clinically  Supportive care. Could discharge by tomorrow if remains stable.     Majo Miles MD    Interval History   Patient was seen and examined, chart reviewed. Feels quite well, has ongoing back pain which has remained chronic and is no different from her baseline.   Remains stable and leukocytosis has resolved, all cxs have remained negative    Physical Exam   Temp: 98.1  F (36.7  C) Temp src: Oral BP: 128/71 Pulse: 108   Resp: 16 SpO2: 93 % O2 Device: None (Room air) Oxygen Delivery: 2 LPM  Vitals:    03/23/25 0100 03/24/25 0531   Weight: 70.5 kg (155 lb 6.8 oz) 75.5 kg (166 lb 7.2 oz)     Vital Signs with Ranges  Temp:  [98.1  F (36.7  C)-99.9  F (37.7  C)] 98.1  F (36.7  C)  Pulse:  [] 108  Resp:  [7-28] 16  BP: ()/(54-85) 128/71  SpO2:  [88 %-98 %] 93 %    Constitutional: Awake, alert,  cooperative, no apparent distress  Eyes : scleral icterus  Lungs: Clear   Cardiovascular: Regular rate and rhythm, normal S1 and S2, and no murmur noted  Abdomen:  soft, non-tender  Skin: No rash    Other:    Medications   Current Facility-Administered Medications   Medication Dose Route Frequency Provider Last Rate Last Admin    0.9% sodium chloride + KCl 20 mEq/L infusion   Intravenous Continuous Ruben Forde MD 50 mL/hr at 03/24/25 1035 Rate Change at 03/24/25 1035     Current Facility-Administered Medications   Medication Dose Route Frequency Provider Last Rate Last Admin    ceFEPIme (MAXIPIME) 2 g vial to attach to  mL bag for ADULTS or NS 50 mL bag for PEDS  2 g Intravenous Q8H Law Moore MD        folic acid (FOLVITE) tablet 1 mg  1 mg Oral Daily Ruben Forde MD   1 mg at 03/24/25 0933    gabapentin (NEURONTIN) capsule 100 mg  100 mg Oral or NG Tube TID Law Moore MD   100 mg at 03/24/25 1520    multivitamin w/minerals (THERA-VIT-M) tablet 1 tablet  1 tablet Oral Daily Ruben Forde MD   1 tablet at 03/24/25 0933    sodium chloride (PF) 0.9% PF flush 10-40 mL  10-40 mL Intracatheter Q7 Days Law Moore MD   10 mL at 03/22/25 2151    sodium chloride (PF) 0.9% PF flush 10-40 mL  10-40 mL Intracatheter Daily Law Moore MD   30 mL at 03/24/25 1038    thiamine (B-1) tablet 100 mg  100 mg Oral Daily Ruben Forde MD   100 mg at 03/24/25 0933    vancomycin (VANCOCIN) 1,000 mg in 200 mL dextrose intermittent infusion  1,000 mg Intravenous Q12H Law Moore  mL/hr at 03/24/25 1036 1,000 mg at 03/24/25 1036       Data   All microbiology laboratory data reviewed.  Recent Labs   Lab Test 03/24/25  0954 03/23/25  0619 03/23/25  0014   WBC 7.5 6.8 5.9   HGB 7.7* 7.9* 7.6*   HCT 24.3* 23.9* 23.6*   MCV 92 90 92   PLT 51* 59* 61*     Recent Labs   Lab Test 03/24/25  0526 03/23/25  0619 03/23/25  0014   CR 0.95 1.58* 1.31*     Component      Latest Ref Rng 3/22/2025  4:34  PM   % Neutrophils Fluid      % 7 (P)   % Lymphocytes Fluid      % 71 (P)   % Mono/Macro Fluid      % 20 (P)   % Lining Cells      % 2 (P)   Absolute Neutrophils, Body Fluid        /uL 48.3 (P)   Pathologist Review Comments (Body Fluid)  Pathologist review confirms bloody fluid with chronic inflammation, macrophages and reactive appearing mesothelial cells.  No atypical or malignant cells are identified. (P)   Color Fluid      Colorless, Yellow  Yellow    Appearance Fluid      Clear  Hazy !    Cell Count Fluid Source Abdomen    Total Nucleated Cells      /uL 690    Albumin Fluid Source Abdomen    Albumin Fluid      g/dL 2.3    Protein Fluid Source Abdomen    Protein Total Fluid      g/dL 4.8       Imaging  EXAM: XR CHEST PORT 1 VIEW  LOCATION: [facility]  DATE: 3/22/2025     INDICATION: PICC line placement verification  COMPARISON: None.                                                                      IMPRESSION: Right PICC tip at the caval atrial junction. The visualized lung fields are clear. The cardiac silhouette and pulmonary vasculature are normal.       EXAM: CT ABDOMEN PELVIS W CONTRAST  LOCATION: [facility]  DATE: 3/22/2025     INDICATION: Distention, vomiting, h o ETOH  COMPARISON: 11/8/2024 CT  TECHNIQUE: CT scan of the abdomen and pelvis was performed following injection of IV contrast. Multiplanar reformats were obtained. Dose reduction techniques were used.  CONTRAST: 91mL Isovue 370     FINDINGS:   LOWER CHEST: Normal.     HEPATOBILIARY: Cirrhosis with changes of portal venous hypertension with recanalized umbilical vein and multiple collateral vessels and moderate ascites. No liver masses.     PANCREAS: Normal.     SPLEEN: 12.0 cm     ADRENAL GLANDS: Normal.     KIDNEYS/BLADDER: Tiny 2 mm nonobstructing stone right kidney unchanged.     BOWEL: Normal.     LYMPH NODES: Normal.     VASCULATURE: Normal.     PELVIC ORGANS: Tiny uterine fibroid.      MUSCULOSKELETAL: No concerning bone lesion.                                                                      IMPRESSION:   1.  Cirrhosis with recanalized umbilical vein and moderate ascites similar to previous.     2.  No significant new findings.

## 2025-03-24 NOTE — CONSULTS
Initial Psychiatric Consult   Consult date: March 24, 2025         Reason for Consult, requesting source:    Reason for consult alcohol use and depression  Requesting source: Law Moore    Labs and imaging reviewed.  Reviewed    Total time spent in chart review, patient interview and coordination of care; 62 min        HPI:     Pauline Merino is a 49 year old female with a history of alcohol use disorder, hypertension and type 2 diabetes mellitus who was brought in by EMS from home for evaluation of abdominal pain, back pain, nausea and vomiting symptoms of past 2 days       Patient seen today.  Patient reports he began to drink alcohol at the age of 22.  Her drinking became a problem 2 years ago after her ex- passed away.  She reports he passed away from alcoholism she was diagnosed with alcoholic cirrhosis 3 months ago.  .  She reports that she was sober for almost a month and a half and relapsed since mid January.  She reports she has been drinking alcohol daily.  She drinks it to help with her insomnia.  Alcohol is her drug of choice  Patient has tolerance withdrawal progressive use loss of control tried to quit unsuccessfully she spent more time more amount than intended.  She hides her drinking.  She denies any history of DTs or seizures.  Patient uses marijuana occasionally but denied using any other illicit drugs    .    Patient denies having depression but says that she has guilt over the fact after she  her ex- he passed away from alcoholism.  She denies any neurovegetative symptoms of depression.  She denies any juliette and denies any anxiety denies any PTSD.  She denies any suicidal ideation plan or intent she denies any auditory or visual hallucinations.          Past Psychiatric History:   Patient was never psychiatrically hospitalized never had any chemical dependency treatments        Substance Use and History:   As above  Social History     Tobacco Use    Smoking  status: Every Day     Current packs/day: 0.25     Types: Cigarettes    Smokeless tobacco: Current   Substance Use Topics    Alcohol use: Yes           Past Medical History:   PAST MEDICAL HISTORY:   Past Medical History:   Diagnosis Date    BMI 31.0-31.9,adult     Dermatitis        PAST SURGICAL HISTORY:   Past Surgical History:   Procedure Laterality Date    HC TOOTH EXTRACTION W/FORCEP               Family History:   FAMILY HISTORY:   Family History   Adopted: Yes   Problem Relation Age of Onset    Medical History Unknown Mother            Social History:       Patient is unemployed.  She has a significant other         Physical ROS:   The 10 point Review of Systems is negative other than noted in the HPI or here.           Medications:     Current Facility-Administered Medications   Medication Dose Route Frequency Provider Last Rate Last Admin    ceFEPIme (MAXIPIME) 2 g vial to attach to  mL bag for ADULTS or NS 50 mL bag for PEDS  2 g Intravenous Q8H Law Moore MD        folic acid (FOLVITE) tablet 1 mg  1 mg Oral Daily Ruben Forde MD   1 mg at 03/24/25 0933    gabapentin (NEURONTIN) capsule 100 mg  100 mg Oral or NG Tube TID Law Moore MD   100 mg at 03/24/25 0933    multivitamin w/minerals (THERA-VIT-M) tablet 1 tablet  1 tablet Oral Daily Ruben Forde MD   1 tablet at 03/24/25 0933    potassium & sodium phosphates (NEUTRA-PHOS) Packet 2 packet  2 packet Oral or Feeding Tube Q4H Ruben Forde MD   2 packet at 03/24/25 1115    sodium chloride (PF) 0.9% PF flush 10-40 mL  10-40 mL Intracatheter Q7 Days Law Moore MD   10 mL at 03/22/25 2151    sodium chloride (PF) 0.9% PF flush 10-40 mL  10-40 mL Intracatheter Daily Law Moore MD   30 mL at 03/24/25 1038    thiamine (B-1) tablet 100 mg  100 mg Oral Daily Ruben Forde MD   100 mg at 03/24/25 0933    vancomycin (VANCOCIN) 1,000 mg in 200 mL dextrose intermittent infusion  1,000 mg Intravenous Q12H Law Moore MD  200 mL/hr at 03/24/25 1036 1,000 mg at 03/24/25 1036              Allergies:   No Known Allergies       Labs:     Recent Results (from the past 48 hours)   iStat Gases (lactate) venous, POCT    Collection Time: 03/22/25  3:38 PM   Result Value Ref Range    Lactic Acid POCT >19.0 (HH) 0.7 - 2.0 mmol/L    Bicarbonate Venous POCT 5 (LL) 21 - 28 mmol/L    O2 Sat, Venous POCT 58 (L) 70 - 75 %    pCO2 Venous POCT 23 (L) 40 - 50 mm Hg    pH Venous POCT 6.97 (LL) 7.32 - 7.43    pO2 Venous POCT 46 25 - 47 mm Hg    Base Excess/Deficit (+/-) POCT -25.0 (L) -3.0 - 3.0 mmol/L   Albumin fluid    Collection Time: 03/22/25  4:34 PM   Result Value Ref Range    Albumin Fluid Source Abdomen     Albumin fluid 2.3 g/dL   Protein fluid    Collection Time: 03/22/25  4:34 PM   Result Value Ref Range    Protein Fluid Source Abdomen     Protein Total Fluid 4.8 g/dL   Ascites Fluid Aerobic Bacterial Culture Routine With Gram Stain    Collection Time: 03/22/25  4:34 PM    Specimen: Peritoneum; Ascites Fluid   Result Value Ref Range    Culture No growth after 1 day     Gram Stain Result No organisms seen     Gram Stain Result 2+ WBC seen    Cell Count Body Fluid    Collection Time: 03/22/25  4:34 PM   Result Value Ref Range    Color Yellow Colorless, Yellow    Clarity Hazy (A) Clear    Cell Count Fluid Source Abdomen     Total Nucleated Cells 690 /uL   Differential Body Fluid    Collection Time: 03/22/25  4:34 PM   Result Value Ref Range    % Neutrophils 7 %    % Lymphocytes 71 %    % Monocyte/Macrophages 20 %    % Lining Cells 2 %    Absolute Neutrophils, Body Fluid 48.3   /uL   Glucose by meter    Collection Time: 03/22/25  6:16 PM   Result Value Ref Range    GLUCOSE BY METER POCT 85 70 - 99 mg/dL   Lactic acid whole blood    Collection Time: 03/22/25  8:51 PM   Result Value Ref Range    Lactic Acid 17.0 (HH) 0.7 - 2.0 mmol/L   Glucose by meter    Collection Time: 03/22/25  9:41 PM   Result Value Ref Range    GLUCOSE BY METER POCT 124 (H) 70  - 99 mg/dL   Basic metabolic panel    Collection Time: 03/23/25 12:14 AM   Result Value Ref Range    Sodium 128 (L) 135 - 145 mmol/L    Potassium 3.1 (L) 3.4 - 5.3 mmol/L    Chloride 78 (L) 98 - 107 mmol/L    Carbon Dioxide (CO2) 19 (L) 22 - 29 mmol/L    Anion Gap 31 (H) 7 - 15 mmol/L    Urea Nitrogen 20.5 (H) 6.0 - 20.0 mg/dL    Creatinine 1.31 (H) 0.51 - 0.95 mg/dL    GFR Estimate 50 (L) >60 mL/min/1.73m2    Calcium 8.6 (L) 8.8 - 10.4 mg/dL    Glucose 171 (H) 70 - 99 mg/dL   Phosphorus    Collection Time: 03/23/25 12:14 AM   Result Value Ref Range    Phosphorus 1.9 (L) 2.5 - 4.5 mg/dL   Magnesium    Collection Time: 03/23/25 12:14 AM   Result Value Ref Range    Magnesium 1.2 (L) 1.7 - 2.3 mg/dL   Blood gas venous    Collection Time: 03/23/25 12:14 AM   Result Value Ref Range    pH Venous 7.41 7.32 - 7.43    pCO2 Venous 33 (L) 40 - 50 mm Hg    pO2 Venous 38 25 - 47 mm Hg    Bicarbonate Venous 21 21 - 28 mmol/L    Base Excess/Deficit Venous -3.6 (L) -3.0 - 3.0 mmol/L    FIO2 21     Oxyhemoglobin Venous 70 70 - 75 %    O2 Sat, Venous 73.0 70.0 - 75.0 %   CBC with platelets    Collection Time: 03/23/25 12:14 AM   Result Value Ref Range    WBC Count 5.9 4.0 - 11.0 10e3/uL    RBC Count 2.57 (L) 3.80 - 5.20 10e6/uL    Hemoglobin 7.6 (L) 11.7 - 15.7 g/dL    Hematocrit 23.6 (L) 35.0 - 47.0 %    MCV 92 78 - 100 fL    MCH 29.6 26.5 - 33.0 pg    MCHC 32.2 31.5 - 36.5 g/dL    RDW 18.0 (H) 10.0 - 15.0 %    Platelet Count 61 (L) 150 - 450 10e3/uL   Lactic acid whole blood    Collection Time: 03/23/25 12:43 AM   Result Value Ref Range    Lactic Acid 10.9 (HH) 0.7 - 2.0 mmol/L   Lactic acid whole blood    Collection Time: 03/23/25  6:19 AM   Result Value Ref Range    Lactic Acid 4.5 (HH) 0.7 - 2.0 mmol/L   CBC with platelets    Collection Time: 03/23/25  6:19 AM   Result Value Ref Range    WBC Count 6.8 4.0 - 11.0 10e3/uL    RBC Count 2.67 (L) 3.80 - 5.20 10e6/uL    Hemoglobin 7.9 (L) 11.7 - 15.7 g/dL    Hematocrit 23.9 (L) 35.0 -  47.0 %    MCV 90 78 - 100 fL    MCH 29.6 26.5 - 33.0 pg    MCHC 33.1 31.5 - 36.5 g/dL    RDW 17.5 (H) 10.0 - 15.0 %    Platelet Count 59 (L) 150 - 450 10e3/uL   Comprehensive metabolic panel    Collection Time: 03/23/25  6:19 AM   Result Value Ref Range    Sodium 129 (L) 135 - 145 mmol/L    Potassium 3.6 3.4 - 5.3 mmol/L    Carbon Dioxide (CO2) 27 22 - 29 mmol/L    Anion Gap 18 (H) 7 - 15 mmol/L    Urea Nitrogen 23.8 (H) 6.0 - 20.0 mg/dL    Creatinine 1.58 (H) 0.51 - 0.95 mg/dL    GFR Estimate 40 (L) >60 mL/min/1.73m2    Calcium 8.3 (L) 8.8 - 10.4 mg/dL    Chloride 84 (L) 98 - 107 mmol/L    Glucose 169 (H) 70 - 99 mg/dL    Alkaline Phosphatase 101 40 - 150 U/L     (H) 0 - 45 U/L    ALT 72 (H) 0 - 50 U/L    Protein Total 7.9 6.4 - 8.3 g/dL    Albumin 3.5 3.5 - 5.2 g/dL    Bilirubin Total 5.1 (H) <=1.2 mg/dL   INR    Collection Time: 03/23/25  6:19 AM   Result Value Ref Range    INR 2.32 (H) 0.85 - 1.15   Magnesium    Collection Time: 03/23/25  6:19 AM   Result Value Ref Range    Magnesium 2.3 1.7 - 2.3 mg/dL   Phosphorus    Collection Time: 03/23/25  6:19 AM   Result Value Ref Range    Phosphorus 2.3 (L) 2.5 - 4.5 mg/dL   Glucose by meter    Collection Time: 03/23/25  6:25 AM   Result Value Ref Range    GLUCOSE BY METER POCT 157 (H) 70 - 99 mg/dL   Blood Culture Hand, Left    Collection Time: 03/23/25  9:14 AM    Specimen: Hand, Left; Blood   Result Value Ref Range    Culture No growth after 1 day    Blood Culture Hand, Left    Collection Time: 03/23/25  9:20 AM    Specimen: Hand, Left; Blood   Result Value Ref Range    Culture No growth after 1 day    MRSA MSSA PCR, Nasal Swab    Collection Time: 03/23/25 10:14 AM    Specimen: Nares, Bilateral; Swab   Result Value Ref Range    MRSA Target DNA Positive (A) Negative    SA Target DNA Positive    Lactic acid whole blood    Collection Time: 03/23/25 12:37 PM   Result Value Ref Range    Lactic Acid 2.6 (H) 0.7 - 2.0 mmol/L   Glucose by meter    Collection Time:  03/23/25 12:38 PM   Result Value Ref Range    GLUCOSE BY METER POCT 130 (H) 70 - 99 mg/dL   Glucose by meter    Collection Time: 03/23/25  4:45 PM   Result Value Ref Range    GLUCOSE BY METER POCT 146 (H) 70 - 99 mg/dL   Phosphorus    Collection Time: 03/24/25  5:26 AM   Result Value Ref Range    Phosphorus 1.4 (L) 2.5 - 4.5 mg/dL   Potassium    Collection Time: 03/24/25  5:26 AM   Result Value Ref Range    Potassium 3.3 (L) 3.4 - 5.3 mmol/L   Comprehensive metabolic panel    Collection Time: 03/24/25  5:26 AM   Result Value Ref Range    Sodium 136 135 - 145 mmol/L    Potassium 3.3 (L) 3.4 - 5.3 mmol/L    Carbon Dioxide (CO2) 23 22 - 29 mmol/L    Anion Gap 16 (H) 7 - 15 mmol/L    Urea Nitrogen 18.9 6.0 - 20.0 mg/dL    Creatinine 0.95 0.51 - 0.95 mg/dL    GFR Estimate 74 >60 mL/min/1.73m2    Calcium 8.4 (L) 8.8 - 10.4 mg/dL    Chloride 97 (L) 98 - 107 mmol/L    Glucose 134 (H) 70 - 99 mg/dL    Alkaline Phosphatase 108 40 - 150 U/L     (H) 0 - 45 U/L    ALT 78 (H) 0 - 50 U/L    Protein Total 7.7 6.4 - 8.3 g/dL    Albumin 3.7 3.5 - 5.2 g/dL    Bilirubin Total 3.6 (H) <=1.2 mg/dL   Glucose by meter    Collection Time: 03/24/25  5:28 AM   Result Value Ref Range    GLUCOSE BY METER POCT 126 (H) 70 - 99 mg/dL   CBC with platelets    Collection Time: 03/24/25  9:54 AM   Result Value Ref Range    WBC Count 7.5 4.0 - 11.0 10e3/uL    RBC Count 2.64 (L) 3.80 - 5.20 10e6/uL    Hemoglobin 7.7 (L) 11.7 - 15.7 g/dL    Hematocrit 24.3 (L) 35.0 - 47.0 %    MCV 92 78 - 100 fL    MCH 29.2 26.5 - 33.0 pg    MCHC 31.7 31.5 - 36.5 g/dL    RDW 18.8 (H) 10.0 - 15.0 %    Platelet Count 51 (L) 150 - 450 10e3/uL          Physical and Psychiatric Examination:     /72   Pulse 104   Temp 98.8  F (37.1  C) (Axillary)   Resp 16   Wt 75.5 kg (166 lb 7.2 oz)   SpO2 93%   BMI 26.87 kg/m    Weight is 166 lbs 7.16 oz  Body mass index is 26.87 kg/m .    Physical Exam:  I have reviewed the physical exam as documented by by the medical  "team and agree with findings and assessment and have no additional findings to add at this time.         MSE:   Appearance: awake, alert  Attitude:  cooperative  Eye Contact:  good  Mood:  better and \"fair\"  Affect:  slightly restricted  Speech:  clear, coherent  Psychomotor Behavior:  no evidence of tardive dyskinesia, dystonia, or tics and intact station, gait and muscle tone  Muscle strength and tone: intact   Thought Process:  logical  Associations:  no loose associations  Thought Content:  no evidence of suicidal ideation or homicidal ideation and no evidence of psychotic thought  Insight:  partial  Judgement:  partial  Oriented to:  time, person, and place  Attention Span and Concentration:  intact  Recent and Remote Memory:  intact      QTc: 655         DSM-5 Diagnosis:   Alcohol use disorder severe alcohol withdrawal severe    Bereavement          Assessment:   Patient is a 49-year-old female who has chronic alcoholism patient drinks even though has a diagnosis of cirrhosis..  She is however willing to do CD treatment.  She denies any neurovegetative symptoms of depression          Summary of Recommendations:   Detox of alcohol using CIWA protocol   Ativan folate multivitamin thiamine    At this time patient does not believe that she needs any psychiatric medications to target her mood.  She has guilt about her divorce.  She reports he drank excessively and he  from a heart attack   Patient is interested in doing CD treatment.  CD consult            \"This dictation was performed with voice recognition software and may contain errors,  omissions and inadvertent word substitution.\"          "

## 2025-03-24 NOTE — PLAN OF CARE
Goal Outcome Evaluation: progressing  Patient awake and oriented x4, ST on monitor BP WDL, up to bathroom x1 assist for line management, taking lactulose as ordered - patient reports multiple stools over night/yesterday, lactulose dose decreased, voiding adequately, K and Phos replaced. Rechecking this evening.   Expressing desire to quit drinking, consults ordered by Hospitalist. Psych at bedside evaluating patient now.  CIWA negative, overall patient is eager to discharge. Accepted to stay one more night.   ID, Hosp, GI, Psych all following.    David SYLVESTER RN on 3/24/2025 at 1:08 PM      Goal: Absence of Hospital-Acquired Illness or Injury  Outcome: Progressing  Intervention: Identify and Manage Fall Risk  Recent Flowsheet Documentation  Taken 3/24/2025 1200 by David Sylvester RN  Safety Promotion/Fall Prevention:   clutter free environment maintained   safety round/check completed   nonskid shoes/slippers when out of bed  Taken 3/24/2025 0800 by David Sylvester RN  Safety Promotion/Fall Prevention:   clutter free environment maintained   safety round/check completed   nonskid shoes/slippers when out of bed  Intervention: Prevent Skin Injury  Recent Flowsheet Documentation  Taken 3/24/2025 1200 by David Sylvester RN  Body Position: position changed independently  Taken 3/24/2025 1000 by David Sylvester RN  Body Position: position changed independently  Taken 3/24/2025 0800 by David Sylvester RN  Body Position: position changed independently  Goal: Optimal Comfort and Wellbeing  Outcome: Progressing  Intervention: Monitor Pain and Promote Comfort  Recent Flowsheet Documentation  Taken 3/24/2025 1200 by David Sylvester RN  Pain Management Interventions:   care clustered   pillow support provided   rest   relaxation techniques promoted   quiet environment facilitated   medication (see MAR)  Taken 3/24/2025 0800 by David Sylvester RN  Pain Management  Interventions:   care clustered   pillow support provided   rest   relaxation techniques promoted   quiet environment facilitated   medication (see MAR)  Intervention: Provide Person-Centered Care  Recent Flowsheet Documentation  Taken 3/24/2025 1200 by David Sylvester, RN  Trust Relationship/Rapport:   care explained   choices provided   reassurance provided   thoughts/feelings acknowledged  Taken 3/24/2025 0800 by David Sylvester, RN  Trust Relationship/Rapport:   care explained   choices provided   reassurance provided   thoughts/feelings acknowledged  Goal: Readiness for Transition of Care  Outcome: Progressing

## 2025-03-25 VITALS
HEART RATE: 99 BPM | SYSTOLIC BLOOD PRESSURE: 137 MMHG | BODY MASS INDEX: 27.44 KG/M2 | DIASTOLIC BLOOD PRESSURE: 75 MMHG | OXYGEN SATURATION: 98 % | TEMPERATURE: 98.9 F | WEIGHT: 170 LBS | RESPIRATION RATE: 16 BRPM

## 2025-03-25 LAB
ALBUMIN SERPL BCG-MCNC: 3.8 G/DL (ref 3.5–5.2)
ALP SERPL-CCNC: 119 U/L (ref 40–150)
ALT SERPL W P-5'-P-CCNC: 70 U/L (ref 0–50)
AMPHETAMINES UR QL SCN: NORMAL
ANION GAP SERPL CALCULATED.3IONS-SCNC: 14 MMOL/L (ref 7–15)
AST SERPL W P-5'-P-CCNC: 211 U/L (ref 0–45)
BARBITURATES UR QL SCN: NORMAL
BENZODIAZ UR QL SCN: NORMAL
BILIRUB SERPL-MCNC: 3.5 MG/DL
BUN SERPL-MCNC: 10.5 MG/DL (ref 6–20)
BZE UR QL SCN: NORMAL
CALCIUM SERPL-MCNC: 8.4 MG/DL (ref 8.8–10.4)
CANNABINOIDS UR QL SCN: NORMAL
CHLORIDE SERPL-SCNC: 104 MMOL/L (ref 98–107)
CREAT SERPL-MCNC: 0.62 MG/DL (ref 0.51–0.95)
EGFRCR SERPLBLD CKD-EPI 2021: >90 ML/MIN/1.73M2
ERYTHROCYTE [DISTWIDTH] IN BLOOD BY AUTOMATED COUNT: 18.6 % (ref 10–15)
FENTANYL UR QL: NORMAL
GLUCOSE SERPL-MCNC: 137 MG/DL (ref 70–99)
HCO3 SERPL-SCNC: 22 MMOL/L (ref 22–29)
HCT VFR BLD AUTO: 24.7 % (ref 35–47)
HGB BLD-MCNC: 8.1 G/DL (ref 11.7–15.7)
INR PPP: 2.13 (ref 0.85–1.15)
MAGNESIUM SERPL-MCNC: 1.8 MG/DL (ref 1.7–2.3)
MCH RBC QN AUTO: 29.5 PG (ref 26.5–33)
MCHC RBC AUTO-ENTMCNC: 32.8 G/DL (ref 31.5–36.5)
MCV RBC AUTO: 90 FL (ref 78–100)
OPIATES UR QL SCN: NORMAL
PCP QUAL URINE (ROCHE): NORMAL
PHOSPHATE SERPL-MCNC: 1.8 MG/DL (ref 2.5–4.5)
PLATELET # BLD AUTO: 53 10E3/UL (ref 150–450)
POTASSIUM SERPL-SCNC: 4.1 MMOL/L (ref 3.4–5.3)
PROT SERPL-MCNC: 7.5 G/DL (ref 6.4–8.3)
RBC # BLD AUTO: 2.75 10E6/UL (ref 3.8–5.2)
SODIUM SERPL-SCNC: 140 MMOL/L (ref 135–145)
WBC # BLD AUTO: 6.6 10E3/UL (ref 4–11)

## 2025-03-25 PROCEDURE — 250N000013 HC RX MED GY IP 250 OP 250 PS 637: Performed by: STUDENT IN AN ORGANIZED HEALTH CARE EDUCATION/TRAINING PROGRAM

## 2025-03-25 PROCEDURE — 85610 PROTHROMBIN TIME: CPT | Performed by: PHYSICIAN ASSISTANT

## 2025-03-25 PROCEDURE — 999N000040 HC STATISTIC CONSULT NO CHARGE VASC ACCESS

## 2025-03-25 PROCEDURE — 250N000013 HC RX MED GY IP 250 OP 250 PS 637: Performed by: INTERNAL MEDICINE

## 2025-03-25 PROCEDURE — 85018 HEMOGLOBIN: CPT | Performed by: INTERNAL MEDICINE

## 2025-03-25 PROCEDURE — 84100 ASSAY OF PHOSPHORUS: CPT | Performed by: STUDENT IN AN ORGANIZED HEALTH CARE EDUCATION/TRAINING PROGRAM

## 2025-03-25 PROCEDURE — 84460 ALANINE AMINO (ALT) (SGPT): CPT | Performed by: INTERNAL MEDICINE

## 2025-03-25 PROCEDURE — 99239 HOSP IP/OBS DSCHRG MGMT >30: CPT | Performed by: INTERNAL MEDICINE

## 2025-03-25 PROCEDURE — 84155 ASSAY OF PROTEIN SERUM: CPT | Performed by: INTERNAL MEDICINE

## 2025-03-25 PROCEDURE — 83735 ASSAY OF MAGNESIUM: CPT | Performed by: STUDENT IN AN ORGANIZED HEALTH CARE EDUCATION/TRAINING PROGRAM

## 2025-03-25 PROCEDURE — 85048 AUTOMATED LEUKOCYTE COUNT: CPT | Performed by: INTERNAL MEDICINE

## 2025-03-25 RX ORDER — LANOLIN ALCOHOL/MO/W.PET/CERES
100 CREAM (GRAM) TOPICAL DAILY
Qty: 30 TABLET | Refills: 0 | Status: SHIPPED | OUTPATIENT
Start: 2025-03-26

## 2025-03-25 RX ORDER — CEFDINIR 300 MG/1
300 CAPSULE ORAL EVERY 12 HOURS
Qty: 4 CAPSULE | Refills: 0 | Status: SHIPPED | OUTPATIENT
Start: 2025-03-25 | End: 2025-03-25

## 2025-03-25 RX ORDER — FOLIC ACID 1 MG/1
1 TABLET ORAL DAILY
Qty: 30 TABLET | Refills: 0 | Status: SHIPPED | OUTPATIENT
Start: 2025-03-25

## 2025-03-25 RX ORDER — CEFDINIR 300 MG/1
300 CAPSULE ORAL EVERY 12 HOURS SCHEDULED
Status: DISCONTINUED | OUTPATIENT
Start: 2025-03-25 | End: 2025-03-25 | Stop reason: HOSPADM

## 2025-03-25 RX ORDER — MULTIPLE VITAMINS W/ MINERALS TAB 9MG-400MCG
1 TAB ORAL DAILY
Qty: 30 TABLET | Refills: 0 | Status: SHIPPED | OUTPATIENT
Start: 2025-03-25

## 2025-03-25 RX ORDER — CEFDINIR 300 MG/1
300 CAPSULE ORAL EVERY 12 HOURS
Qty: 6 CAPSULE | Refills: 0 | Status: SHIPPED | OUTPATIENT
Start: 2025-03-25 | End: 2025-03-28

## 2025-03-25 RX ADMIN — POTASSIUM & SODIUM PHOSPHATES POWDER PACK 280-160-250 MG 2 PACKET: 280-160-250 PACK at 12:11

## 2025-03-25 RX ADMIN — Medication 1 TABLET: at 09:13

## 2025-03-25 RX ADMIN — OXYCODONE HYDROCHLORIDE 5 MG: 5 TABLET ORAL at 05:30

## 2025-03-25 RX ADMIN — GABAPENTIN 100 MG: 100 CAPSULE ORAL at 09:13

## 2025-03-25 RX ADMIN — THIAMINE HCL TAB 100 MG 100 MG: 100 TAB at 09:13

## 2025-03-25 RX ADMIN — CEFDINIR 300 MG: 300 CAPSULE ORAL at 10:55

## 2025-03-25 RX ADMIN — OXYCODONE HYDROCHLORIDE 10 MG: 5 TABLET ORAL at 09:13

## 2025-03-25 RX ADMIN — POTASSIUM & SODIUM PHOSPHATES POWDER PACK 280-160-250 MG 2 PACKET: 280-160-250 PACK at 09:13

## 2025-03-25 RX ADMIN — FOLIC ACID 1 MG: 1 TABLET ORAL at 09:13

## 2025-03-25 ASSESSMENT — ACTIVITIES OF DAILY LIVING (ADL)
ADLS_ACUITY_SCORE: 37
ADLS_ACUITY_SCORE: 40
ADLS_ACUITY_SCORE: 38
ADLS_ACUITY_SCORE: 38
ADLS_ACUITY_SCORE: 40
ADLS_ACUITY_SCORE: 38
ADLS_ACUITY_SCORE: 38
ADLS_ACUITY_SCORE: 35
ADLS_ACUITY_SCORE: 38
ADLS_ACUITY_SCORE: 40
ADLS_ACUITY_SCORE: 38

## 2025-03-25 NOTE — DISCHARGE SUMMARY
"[facility] [place]    Hospitalist Discharge Summary       Date of Admission:  3/22/2025  Date of Discharge:  3/25/2025  Discharging Provider: Ruben Forde MD      Discharge Diagnoses   Severe sepsis, unclear etiology  Lactic acidosis, resolved  Oliguric acute renal failure  Decompensated alcoholic cirrhosis with ascites, s/p paracentesis 3/22  Alcoholic hepatitis  Acute hepatic encephalopathy, improved  Thrombocytopenia    Follow-ups Needed After Discharge   Follow-up Appointments       Follow Up      Follow up with [facility] within a month for follow up  Follow up with a Psychiatrist as outpatient        Hospital to Primary Care - Establish PCP Referral      Please be aware that coverage of these services is subject to the terms and limitations of your health insurance plan.  Call member services at your health plan with any benefit or coverage questions.    Schedule Primary Care visit within: 14 Days   Recommended labs and Imaging (to be ordered by Primary Care Provider): CMP             Unresulted Labs Ordered in the Past 30 Days of this Admission       Date and Time Order Name Status Description    3/23/2025  8:37 AM Blood Culture Hand, Left Preliminary     3/23/2025  8:37 AM Blood Culture Hand, Left Preliminary     3/22/2025  3:44 PM Ascites Fluid Aerobic Bacterial Culture Routine With Gram Stain Preliminary         These results will be followed up by PCP    Hospital Course   Pauline Merino is a 49 year old female with PMH DM2, diabetic ketoacidosis, severe alcohol use disorder, alcoholic cirrhosis with portal hypertension and ascites, tobacco use disorder, who was admitted on 3/22/2025 with severe sepsis.  Presents with 2-day history of worsening abdominal pain with nausea and vomiting in addition to mild confusion.  CT abdomen shows  \"cirrhosis with recanalized umbilical vein and moderate ascites similar to previous. No significant new findings.\" On admission labs are " pertinent for a lactic acid greater than 19, mildly elevated LFTs along with a ammonia of 237. Paracentesis was performed which shows lymphocytic predominance not consistent with SBP. Noted fever, leukocytosis, hypotension on admission concerning for severe sepsis.  Source is unclear. Suspect her concurrent decompensated cirrhosis has worsened the clinical assessment of her sepsis. Blood cultures were collected late and were negative. ID consulted, they suspect possibly a viral syndrome.  - Follow up blood cultures  - Treat with Cefdinir total 7 day course empirically    Oliguric acute renal failure  Baseline Cr 0.5, creatinine 0.86 on admission worsening to 1.58 on 3/23. Patient oliguric since admission. Appears to be volume depleted in setting of severe sepsis. Ddx includes concern for hepatorenal syndrome given her liver failure. Creatinine improved to 0.95 on 3/24 with IVF, albumin. Further improved to 0.6 on 3/25.    Decompensated alcoholic cirrhosis  Alcoholic hepatitis  Ascites due to above, s/p paracentesis 3/22/2025  Note elevated bilirubin 4.2, , ALT 28 on admission, patient reports drinking 4 shots a day PTA. LFTs subsequently have worsened, , ALT 72, bilirubin 5.1, this likely reflects worsening liver failure in setting of sepsis above, another possibility is alcoholic hepatitis. Note improving LFTs on 3/24, 3/25.  - CIWA, thiamine, folate--no evidence of withdrawal so far  - Okay to continue low dose gabapentin  - [facility] consult appreciated  - Chem dep and Psychiatry consulted 3/24    Acute hepatic encephalopathy, improved  Ammonia 237 and noted to have confusion on admission. She is A&Ox3 on 3/23, 3/24. Likely combination of infection and liver decompensation causing elevated ammonia. Lactulose stopped prior to discharge.    Thrombocytopenia: Likely combination of sepsis and liver disease.  - Stable in 50s at discharge    Normocytic anemia: Hgb baseline around 9 since cirrhosis diagnosis in  11/2024, Hgb slightly lower here 7-8s in setting of sepsis.  - Continue to monitor    DM2: A1c 5.3%    HTN: Okay to resume PTA lasix  - Hold PTA aldactone, unclear if patient still taking prior to admission    Clinically Significant Risk Factors        # Hypokalemia: Lowest K = 3.3 mmol/L in last 2 days, will replace as needed   # Hypochloremia: Lowest Cl = 97 mmol/L in last 2 days, will monitor as appropriate  # Hypocalcemia: Lowest Ca = 8.4 mg/dL in last 2 days, will monitor and replace as appropriate        # Coagulation Defect: INR = 2.32 (Ref range: 0.85 - 1.15) and/or PTT = N/A, will monitor for bleeding  # Thrombocytopenia: Lowest platelets = 51 in last 2 days, will monitor for bleeding   # Hypertension: Noted on problem list                # Financial/Environmental Concerns: none           Consultations This Hospital Stay   VASCULAR ACCESS ADULT IP CONSULT  VASCULAR ACCESS ADULT IP CONSULT  VASCULAR ACCESS ADULT IP CONSULT  CARE MANAGEMENT / SOCIAL WORK IP CONSULT  GASTROENTEROLOGY IP CONSULT  INFECTIOUS DISEASES IP CONSULT  PHARMACY TO DOSE VANCO  PSYCHIATRY IP CONSULT  CHEMICAL DEPENDENCY IP CONSULT    Code Status   Full Code    Time Spent on this Encounter   I, Ruben Forde, personally saw the patient today and spent approximately 35 minutes discharging this patient.       Ruben Forde MD  [facility]  ______________________________________________________________________    Physical Exam   Vital Signs: Temp: 98.9  F (37.2  C) Temp src: Oral BP: 137/75 Pulse: 99   Resp: 16 SpO2: 98 % O2 Device: None (Room air)    Weight: 170 lbs 0 oz    Constitutional: Female in NAD  HEENT: Eyes nonicteric, dry oral mucosa  Cardiovascular: RRR, normal S1/2, no m/r/g  Respiratory: CTAB, no wheezing or crackles  Vascular: No LE pitting edema  GI: Normoactive bowel sounds, nontender  Skin/Integumen: Spider angiomata chest  Neuro/Psych: Appropriate affect and mood. A&Ox3, moves all  extremities         Primary Care Physician   Provider Not In System    Discharge Disposition   Discharged to home  Condition at discharge: Stable    Significant Results and Procedures   Most Recent 3 CBC's:  Recent Labs   Lab Test 03/25/25  0653 03/24/25  0954 03/23/25  0619   WBC 6.6 7.5 6.8   HGB 8.1* 7.7* 7.9*   MCV 90 92 90   PLT 53* 51* 59*     Most Recent 3 BMP's:  Recent Labs   Lab Test 03/25/25  0653 03/24/25  2041 03/24/25  0528 03/24/25  0526 03/23/25  0625 03/23/25  0619     --   --  136  --  129*   POTASSIUM 4.1 3.6  --  3.3*  3.3*  --  3.6   CHLORIDE 104  --   --  97*  --  84*   CO2 22  --   --  23  --  27   BUN 10.5  --   --  18.9  --  23.8*   CR 0.62  --   --  0.95  --  1.58*   ANIONGAP 14  --   --  16*  --  18*   ALEX 8.4*  --   --  8.4*  --  8.3*   *  --  126* 134*   < > 169*    < > = values in this interval not displayed.     Most Recent 2 LFT's:  Recent Labs   Lab Test 03/25/25  0653 03/24/25  0526   * 299*   ALT 70* 78*   ALKPHOS 119 108   BILITOTAL 3.5* 3.6*     Most Recent 3 INR's:  Recent Labs   Lab Test 03/23/25  0619 03/22/25  1318 11/09/24  0736   INR 2.32* 2.28* 1.48*     Most Recent 3 Troponin's:No lab results found.  Most Recent 3 BNP's:No lab results found.  Most Recent D-dimer:No lab results found.  Most Recent Cholesterol Panel:  Recent Labs   Lab Test 03/01/18  1124   CHOL 197   LDL 85   HDL 71   TRIG 207*       Results for orders placed or performed during the hospital encounter of 03/22/25   CT Abdomen Pelvis w Contrast    Narrative    EXAM: CT ABDOMEN PELVIS W CONTRAST  LOCATION: [facility]  DATE: 3/22/2025    INDICATION: Distention, vomiting, h o ETOH  COMPARISON: 11/8/2024 CT  TECHNIQUE: CT scan of the abdomen and pelvis was performed following injection of IV contrast. Multiplanar reformats were obtained. Dose reduction techniques were used.  CONTRAST: 91mL Isovue 370    FINDINGS:   LOWER CHEST: Normal.    HEPATOBILIARY: Cirrhosis  with changes of portal venous hypertension with recanalized umbilical vein and multiple collateral vessels and moderate ascites. No liver masses.    PANCREAS: Normal.    SPLEEN: 12.0 cm    ADRENAL GLANDS: Normal.    KIDNEYS/BLADDER: Tiny 2 mm nonobstructing stone right kidney unchanged.    BOWEL: Normal.    LYMPH NODES: Normal.    VASCULATURE: Normal.    PELVIC ORGANS: Tiny uterine fibroid.    MUSCULOSKELETAL: No concerning bone lesion.      Impression    IMPRESSION:   1.  Cirrhosis with recanalized umbilical vein and moderate ascites similar to previous.    2.  No significant new findings.   US Paracentesis without Albumin    Narrative    EXAM:  1. PARACENTESIS  2. ULTRASOUND GUIDANCE  LOCATION: [facility]  DATE: 3/22/2025    INDICATION: Ascites. Alcoholic cirrhosis.    PROCEDURE: Informed consent obtained. Time out performed. The abdomen was prepped and draped in a sterile fashion. 10 mL of 1% lidocaine was infused into local soft tissues. A 5 [place] catheter system was introduced into the abdominal ascites under   ultrasound guidance.    3 liters of bright yellow fluid were removed and sent to lab if requested.    Patient tolerated procedure well.    Ultrasound imaging was obtained and placed in the patient's permanent medical record.      Impression    IMPRESSION:  1.  Status post ultrasound-guided paracentesis.     XR Chest Port 1 View    Narrative    EXAM: XR CHEST PORT 1 VIEW  LOCATION: [facility]  DATE: 3/22/2025    INDICATION: PICC line placement verification  COMPARISON: None.      Impression    IMPRESSION: Right PICC tip at the caval atrial junction. The visualized lung fields are clear. The cardiac silhouette and pulmonary vasculature are normal.       Discharge Orders      Hospital to Primary Care - Establish PCP Referral      Reason for your hospital stay    You were hospitalized for a infection (unclear source) as well as decompensated liver failure.  These are both improving at discharge.     Activity    Your activity upon discharge: activity as tolerated     Follow Up    Follow up with [facility] within a month for follow up  Follow up with a Psychiatrist as outpatient     Diet    Follow this diet upon discharge:        Regular Diet Adult     Discharge Medications   Current Discharge Medication List        START taking these medications    Details   cefdinir (OMNICEF) 300 MG capsule Take 1 capsule (300 mg) by mouth every 12 hours for 3 days.  Qty: 6 capsule, Refills: 0    Associated Diagnoses: Severe sepsis (H)      thiamine (B-1) 100 MG tablet Take 1 tablet (100 mg) by mouth daily.  Qty: 30 tablet, Refills: 0    Associated Diagnoses: Alcoholic cirrhosis of liver with ascites (H)           CONTINUE these medications which have CHANGED    Details   folic acid (FOLVITE) 1 MG tablet Take 1 tablet (1 mg) by mouth daily.  Qty: 30 tablet, Refills: 0    Associated Diagnoses: Alcoholic cirrhosis of liver with ascites (H)      multivitamin w/minerals (THERA-VIT-M) tablet Take 1 tablet by mouth daily.  Qty: 30 tablet, Refills: 0    Associated Diagnoses: Alcoholic cirrhosis of liver with ascites (H)           CONTINUE these medications which have NOT CHANGED    Details   furosemide (LASIX) 20 MG tablet Take 1 tablet (20 mg) by mouth daily.  Qty: 30 tablet, Refills: 0    Associated Diagnoses: Alcoholic cirrhosis of liver with ascites (H)      gabapentin (NEURONTIN) 100 MG capsule Take 100 mg by mouth 3 times daily.      naltrexone (DEPADE/REVIA) 50 MG tablet Take 50 mg by mouth daily.           STOP taking these medications       spironolactone (ALDACTONE) 50 MG tablet Comments:   Reason for Stopping:             Allergies   No Known Allergies

## 2025-03-25 NOTE — PROGRESS NOTES
Care Management Discharge Note    Discharge Date: 03/25/2025       Discharge Disposition: Home    Discharge Services:      Discharge DME:      Discharge Transportation:      Private pay costs discussed: Not applicable    Does the patient's insurance plan have a 3 day qualifying hospital stay waiver?  No    PAS Confirmation Code:    Patient/family educated on Medicare website which has current facility and service quality ratings:      Education Provided on the Discharge Plan:    Persons Notified of Discharge Plans: patient, bedside nurse  Patient/Family in Agreement with the Plan:      Handoff Referral Completed: No, handoff not indicated or clinically appropriate    Additional Information:  Patient discharging home today.    Patient requested to establish care at the [facility].  Her preference is for a female provider with early morning appointment.  The appointment was scheduled for Tuesday April 8 at 8:40 am with provider Jud Alvarado at the [facility].     She will follow up with [facility].  She states she has their number, however they will likely call her to schedule.    She is to follow up with psychiatry; she states they will be calling her to schedule.    CD has added resources to the AVS.       Her meds are filled here and her significant other will be providing transportation home.      Marion Raygoza RN  Inpatient Float Care Coordinator  [facility]  [email or web address]

## 2025-03-25 NOTE — CONSULTS
Met with pt for CD Consult and introduced self and role in pt's care.  Inquired about pt's interest in ALY Assessment for referrals to Tx or any additional community resources.  Pt reports she is interested in OP ALY Tx, but declined to complete ALY Assessment at time of Consult when offered by this writer and states she anticipates discharging home soon.  Pt requested to receive information on obtaining a ALY Assessment on an OP-basis and other community CD resources, via e-mail, sending to pt via secure e-mail to [email or web address] and adding to pt's AVS.  Discussed process of ALY Assessment to access Tx with pt, and other community-based recovery supports such as AA.    Relayed to pt that if they change their mind while admitted and would like to complete a ALY Assessment for referrals to treatment or need any additional CD Resources they may alert unit staff who will assist in having CD Consult re-ordered.  If pt has active insurance they may also schedule an assessment on an outpatient basis by calling [facility] at [phone].    RINA Sands, [facility]  Substance Use Disorder Evaluation Counselor  Email: [email or web address]

## 2025-03-25 NOTE — PROGRESS NOTES
[facility]  [facility]  Gastroenterology Progress note    Interval History:      Patient reports that she is feeling much better.  Tolerating diet.  Chronic lower back pain but otherwise no abdominal pain or other complaints.        Vital Signs:      /75 (BP Location: Right arm)   Pulse 99   Temp 98.9  F (37.2  C) (Oral)   Resp 16   Wt 77.1 kg (170 lb)   SpO2 98%   BMI 27.44 kg/m    Temp (24hrs), Av.9  F (37.2  C), Min:98.1  F (36.7  C), Max:99.9  F (37.7  C)    Patient Vitals for the past 72 hrs:   Weight   25 0524 77.1 kg (170 lb)   25 0531 75.5 kg (166 lb 7.2 oz)   25 0100 70.5 kg (155 lb 6.8 oz)       Intake/Output Summary (Last 24 hours) at 3/25/2025 0847  Last data filed at 3/25/2025 0215  Gross per 24 hour   Intake 1473.75 ml   Output 400 ml   Net 1073.75 ml         Constitutional: NAD, comfortable  Cardiovascular: RRR, normal S1, S2   Respiratory: CTAB  Abdomen: soft, non-tender, distended    Additional Comments:  ROS, FH, SH: See initial GI consult for details.    Laboratory Data:  Recent Labs   Lab Test 25  0653 25  0954 25  0619 25  0014 25  1318 11/10/24  1134 24  0736   WBC 6.6 7.5 6.8   < > 15.1*   < > 6.2   HGB 8.1* 7.7* 7.9*   < > 9.7*   < > 8.9*   MCV 90 92 90   < > 102*   < > 97   PLT 53* 51* 59*   < > 203   < > 133*   INR  --   --  2.32*  --  2.28*  --  1.48*    < > = values in this interval not displayed.     Recent Labs   Lab Test 25  0653 25  2041 25  0526 25  0619     --  136 129*   POTASSIUM 4.1 3.6 3.3*  3.3* 3.6   CHLORIDE 104  --  97* 84*   CO2 22  --  23 27   BUN 10.5  --  18.9 23.8*   CR 0.62  --  0.95 1.58*   ANIONGAP 14  --  16* 18*   ALEX 8.4*  --  8.4* 8.3*     Recent Labs   Lab Test 25  0653 25  2221 25  0526 25  0619 25  1318 24  0736 24  1810 24  1735   ALBUMIN 3.8  --  3.7 3.5 4.3   < >  --  3.8  3.8    BILITOTAL 3.5*  --  3.6* 5.1* 4.2*   < >  --  2.1*   ALT 70*  --  78* 72* 28   < >  --  34   *  --  299* 431* 104*   < >  --  223*   ALKPHOS 119  --  108 101 181*   < >  --  247*   PROTEIN  --  Negative  --   --   --   --  20*  --    LIPASE  --   --   --   --  31  --   --  25    < > = values in this interval not displayed.         Assessment:  48 yo female with decompensated cirrhosis of liver secondary to alcohol use.  Patient is actively drinking.  MELD 31.  Ascitic fluid negative for SBP.  Liver function tests elevated in pattern consistent with alcoholic hepatitis.    Imaging in 11/2024 negative for gallstones or gallbladder wall thickening.  CT A/P 3/22 with cirrhosis with recanalized umbilical vein and moderate ascites similar to previous.    Hemoglobin 8.1.  No signs of overt GI bleeding.  Anemia most likely due to bone marrow suppression from alcohol use.  No previous EGD.    Acute hepatic encephalopathy improved.  Lactulose stopped.  Plan:  -  Appreciate ID input.  Antibiotics discontinued.  -  Trend MELD labs.  Check INR today.  -  Paracentesis prn.  -  Low sodium diet as tolerated.  -  Complete alcohol cessation.  -  Not a candidate for liver transplant with active drinking.  -  Monitor for withdrawal symptoms.  -  Patient likely ready for discharge from GI perspective today or tomorrow.  Defer to primary team.  -  Outpatient follow up in [facility] liver clinic on discharge.  [facility] will call to arrange.  -  Outpatient EGD to assess for varices.  Can consider as inpatient if she develops signs of GI bleed.     Total Time Spent: 11 minutes    RADHA Edward  [facility]  Office:  [phone] call if needed after 5PM  Cell:  [phone], not available after 5PM at this number

## 2025-03-25 NOTE — PLAN OF CARE
Goal Outcome Evaluation:    SUMMARY: severe sepsis    DATE & TIME: March 25, 2025; [id number]      Cognitive Concerns/ Orientation : A/O x4     BEHAVIOR & AGGRESSION TOOL COLOR: Green    CIWA SCORE: 0     ABNL VS/O2: VSS on RA    MOBILITY: Independent in the room    PAIN MANAGMENT:     DIET: 6/10 tx with prn oxy    BOWEL/BLADDER: cont BB, no BM this shift    ABNL LAB/BG: INR 2.13    DRAIN/DEVICES: PICC removed before D/C    TELEMETRY RHYTHM: NSR    SKIN: WDL    TESTS/PROCEDURES: None    D/C DAY/GOALS/PLACE: today    OTHER IMPORTANT INFO:

## 2025-03-25 NOTE — PROGRESS NOTES
Primary: Admitted with severe sepsis (came in abd pain, nausea and vomiting and confusion; ETOH withdrawal.      Orientation: A/O x4, Oxy given    Vitals/Tele: VSS on RA    IV Access/drains: Triple lumen PICC infusing NS KCL 20mEq @ 50 ml/hr     Diet: Regular    Mobility: SBA    GI/: Continent of B/B    Wound/Skin: Scattered scratches    Consults:     Discharge Plan: TBD    Other Info: Phos 1.1-replacing via IV; recheck in AM. ALT 78; ; Bilirubin 3.6    Transfer from ICU -Alcohol level 0.07  -Elevated LFTS; along with ammonia of 237; paracentesis performed on 3/22-3 Liters     See Flow sheets for assessment

## 2025-03-25 NOTE — DISCHARGE INSTRUCTIONS
CHEMICAL HEALTH RESOURCES:  The first step to accessing chemical health treatment is to complete a Chemical Health Assessment.  Chemical Health Assessments are beneficial as the  can help you find a treatment program that is a good fit for your goals and needs. The  will make the referrals for you and help follow up until you get into a program.  To schedule an Assessment with [facility], call Behavioral Access at [phone].       [facility] program offers access to chemical health services (individual counseling, group counseling) and peer recovery support services virtually. For more information and to schedule, call: [phone].     To find recovery support meetings near you or online:  AA: https://aaZoomorama.org/meetings/ or call [phone]  NA: https://naminnesProject Travel.org/find-a-meeting/ or call 1-965.171.8716  Marijuana Anonymous: https://marijuana-anonymous.org/  [facility] Recovery: https://meetings.smartrecFoxyTunesy.org/meetings/  [facility] Recovery: https://refugerecoverymeetings.org/meetings  Celebrate Recovery: https://www.See[employer].EBDSoft/what-we-offer/find-a-cr-meeting  [place] Recovery Connection: https://[facility] Center[facility].org/ or call 087-047-3028 (All Recovery Meetings & Telephone Recovery Support)  [facility] Recovery: https://recoverydharma.org/meetings/    Ways to help cope with sobriety:   Take prescribed medicines as scheduled   Keep follow-up appointments   Talk to others about your concerns   Get regular exercise   Practice deep breathing skills   Eat a healthy diet   Use community resources, including hotline numbers, [facility] crisis and support meetings   Stay sober and avoid places/people/things associated with substance use   Maintain a daily schedule/routine   Get at least 7-8 hours of sleep per night   Create a list 10--20 healthy activities that you can do that are enjoyable and do not involve substance use   Create daily goals (approx. 1-4  goals) per day and work to achieve them throughout the day    [place] Recovery Connection (MRC): [facility] connects people seeking recovery to resources that help foster and sustain long-term recovery. Whether you are seeking resources for treatment, transportation, housing, job training, education, health care or other pathways to recovery, [facility] is a great place to start. Phone: [phone]. www.[facility] Center[facility].Ohlalapps (Great listing of all types of recovery and non-recovery related resources).    [place] [employer] [employer]  [address] [place] [address]  Phone: [phone]  E-mail: [email or web address]  Website: [email or web address]

## 2025-03-25 NOTE — PLAN OF CARE
Summary: ETOH Withdrawal; Transfer from ICU  DATE & TIME: 03/24/2025 1930-2330   Cognitive Concerns/ Orientation : A&Ox4   BEHAVIOR & AGGRESSION TOOL COLOR: Green  CIWA SCORE: 0   ABNL VS/O2: VSS RA  MOBILITY: SBA  PAIN MANAGMENT: c/o back pain; PRN oxycodone x1 this shift. On scheduled gabapentin.   DIET: Regular  BOWEL/BLADDER: Continent  ABNL LAB/BG: Phos 1.1-replacing via IV; recheck in AM. ALT 78; ; Bilirubin 3.6  DRAIN/DEVICES: Triple lumen PICC infusing NS KCl 20mEq at 50 ml/hr  TELEMETRY RHYTHM: Sinus Rhythm  SKIN: Distended abdomen; random scratches  TESTS/PROCEDURES: UA in process. Paracentesis completed on 3/22  D/C DAY/GOALS/PLACE: TBD  OTHER IMPORTANT INFO: Transfer from ICU this afternoon

## 2025-03-26 ENCOUNTER — PATIENT OUTREACH (OUTPATIENT)
Dept: CARE COORDINATION | Facility: CLINIC | Age: 50
End: 2025-03-26
Payer: COMMERCIAL

## 2025-03-26 NOTE — PROGRESS NOTES
[facility]: Transitions of Care Outreach  Chief Complaint   Patient presents with    Clinic Care Coordination - Post Hospital       Most Recent Admission Date: 3/22/2025   Most Recent Admission Diagnosis: Alcoholic ketoacidosis - E87.29  Generalized abdominal pain - R10.84  Elevated INR - R79.1  Alcoholic cirrhosis of liver with ascites (H) - K70.31     Most Recent Discharge Date: 3/25/2025   Most Recent Discharge Diagnosis: Alcoholic ketoacidosis - E87.29  Alcoholic cirrhosis of liver with ascites (H) - K70.31  Elevated INR - R79.1  Generalized abdominal pain - R10.84  Severe sepsis (H) - A41.9, R65.20     Transitions of Care Assessment    Discharge Assessment  How are you doing now that you are home?: I am feeling much better than I was. I feel about 70% right now.  How are your symptoms? (Red Flag symptoms escalate to triage hotline per guidelines): Improved  Do you know how to contact your clinic care team if you have future questions or changes to your health status? : Yes  Does the patient have their discharge instructions? : Yes  Does the patient have questions regarding their discharge instructions? : No  Were you started on any new medications or were there changes to any of your previous medications? : Yes  Does the patient have all of their medications?: Yes  Do you have questions regarding any of your medications? : No                  Follow up Plan     Discharge Follow-Up  Discharge follow up appointment scheduled in alignment with recommended follow up timeframe or Transitions of Risk Category? (Low = within 30 days; Moderate= within 14 days; High= within 7 days): Yes  Discharge Follow Up Appointment Date: 04/08/25  Discharge Follow Up Appointment Scheduled with?: Primary Care Provider    Future Appointments   Date Time Provider Department Center   4/8/2025  9:00 AM Jud Alvarado DNP [facility]       Outpatient Plan as outlined on AVS reviewed with patient.    For any urgent  concerns, please contact our 24 hour nurse triage line: [phone])       MARIA VICTORIA Reynoso  [phone]  [facility]

## 2025-03-27 LAB
BACTERIA BLD CULT: NORMAL
BACTERIA BLD CULT: NORMAL
BACTERIA FLD CULT: NO GROWTH
GRAM STAIN RESULT: NORMAL
GRAM STAIN RESULT: NORMAL

## 2025-04-11 PROBLEM — R10.84 GENERALIZED ABDOMINAL PAIN: Status: RESOLVED | Noted: 2024-11-08 | Resolved: 2025-04-11

## 2025-04-11 PROBLEM — R03.0 ELEVATED BP WITHOUT DIAGNOSIS OF HYPERTENSION: Status: ACTIVE | Noted: 2025-04-11

## 2025-04-11 PROBLEM — E88.89 KETOSIS (H): Status: RESOLVED | Noted: 2024-11-08 | Resolved: 2025-04-11

## 2025-04-12 ENCOUNTER — HEALTH MAINTENANCE LETTER (OUTPATIENT)
Age: 50
End: 2025-04-12

## 2025-07-05 ENCOUNTER — HEALTH MAINTENANCE LETTER (OUTPATIENT)
Age: 50
End: 2025-07-05

## 2025-07-17 DIAGNOSIS — K70.31 ALCOHOLIC CIRRHOSIS OF LIVER WITH ASCITES (H): ICD-10-CM

## 2025-07-17 RX ORDER — FOLIC ACID 1 MG/1
1000 TABLET ORAL DAILY
Qty: 90 TABLET | Refills: 0 | Status: SHIPPED | OUTPATIENT
Start: 2025-07-17

## 2025-07-17 RX ORDER — FUROSEMIDE 20 MG/1
20 TABLET ORAL DAILY
Qty: 90 TABLET | Refills: 0 | Status: SHIPPED | OUTPATIENT
Start: 2025-07-17

## 2025-08-16 ENCOUNTER — HEALTH MAINTENANCE LETTER (OUTPATIENT)
Age: 50
End: 2025-08-16